# Patient Record
Sex: FEMALE | Race: BLACK OR AFRICAN AMERICAN | Employment: FULL TIME | URBAN - METROPOLITAN AREA
[De-identification: names, ages, dates, MRNs, and addresses within clinical notes are randomized per-mention and may not be internally consistent; named-entity substitution may affect disease eponyms.]

---

## 2017-01-20 ENCOUNTER — OFFICE VISIT (OUTPATIENT)
Dept: FAMILY MEDICINE CLINIC | Age: 37
End: 2017-01-20

## 2017-01-20 VITALS
BODY MASS INDEX: 32.05 KG/M2 | OXYGEN SATURATION: 98 % | SYSTOLIC BLOOD PRESSURE: 118 MMHG | WEIGHT: 216.4 LBS | HEART RATE: 81 BPM | TEMPERATURE: 97.2 F | RESPIRATION RATE: 16 BRPM | DIASTOLIC BLOOD PRESSURE: 72 MMHG | HEIGHT: 69 IN

## 2017-01-20 DIAGNOSIS — I10 ESSENTIAL HYPERTENSION: ICD-10-CM

## 2017-01-20 DIAGNOSIS — M25.561 ACUTE PAIN OF BOTH KNEES: Primary | ICD-10-CM

## 2017-01-20 DIAGNOSIS — M25.562 ACUTE PAIN OF BOTH KNEES: Primary | ICD-10-CM

## 2017-01-20 PROCEDURE — 99214 OFFICE O/P EST MOD 30 MIN: CPT | Performed by: FAMILY MEDICINE

## 2017-01-20 RX ORDER — NAPROXEN 500 MG/1
500 TABLET ORAL 2 TIMES DAILY WITH MEALS
Qty: 30 TABLET | Refills: 1 | Status: SHIPPED | OUTPATIENT
Start: 2017-01-20 | End: 2017-05-08 | Stop reason: ALTCHOICE

## 2017-01-20 RX ORDER — HYDROCHLOROTHIAZIDE 25 MG/1
25 TABLET ORAL DAILY
Qty: 30 TABLET | Refills: 1 | Status: SHIPPED | OUTPATIENT
Start: 2017-01-20 | End: 2017-04-14 | Stop reason: SDUPTHER

## 2017-01-20 ASSESSMENT — ENCOUNTER SYMPTOMS
NAUSEA: 0
ANAL BLEEDING: 0
STRIDOR: 0
COLOR CHANGE: 0
CONSTIPATION: 0
RECTAL PAIN: 0
ABDOMINAL PAIN: 0
CHOKING: 0
CHEST TIGHTNESS: 0
COUGH: 0
DIARRHEA: 0
SHORTNESS OF BREATH: 0
APNEA: 0
BLOOD IN STOOL: 0
ABDOMINAL DISTENTION: 0
WHEEZING: 0
VOMITING: 0

## 2017-01-24 ENCOUNTER — HOSPITAL ENCOUNTER (OUTPATIENT)
Dept: OTHER | Age: 37
Discharge: HOME | End: 2017-01-24
Attending: FAMILY MEDICINE | Admitting: FAMILY MEDICINE

## 2017-01-24 ENCOUNTER — TELEPHONE (OUTPATIENT)
Dept: FAMILY MEDICINE CLINIC | Age: 37
End: 2017-01-24

## 2017-01-24 DIAGNOSIS — M25.562 ACUTE PAIN OF BOTH KNEES: ICD-10-CM

## 2017-01-24 DIAGNOSIS — M25.561 ACUTE PAIN OF BOTH KNEES: ICD-10-CM

## 2017-02-22 ENCOUNTER — TELEPHONE (OUTPATIENT)
Dept: FAMILY MEDICINE CLINIC | Age: 37
End: 2017-02-22

## 2017-05-08 ENCOUNTER — OFFICE VISIT (OUTPATIENT)
Dept: FAMILY MEDICINE CLINIC | Age: 37
End: 2017-05-08

## 2017-05-08 VITALS
DIASTOLIC BLOOD PRESSURE: 76 MMHG | RESPIRATION RATE: 16 BRPM | WEIGHT: 223.6 LBS | HEART RATE: 70 BPM | HEIGHT: 69 IN | TEMPERATURE: 98.5 F | OXYGEN SATURATION: 98 % | BODY MASS INDEX: 33.12 KG/M2 | SYSTOLIC BLOOD PRESSURE: 110 MMHG

## 2017-05-08 DIAGNOSIS — M54.41 ACUTE RIGHT-SIDED LOW BACK PAIN WITH RIGHT-SIDED SCIATICA: Primary | ICD-10-CM

## 2017-05-08 PROCEDURE — 99214 OFFICE O/P EST MOD 30 MIN: CPT | Performed by: FAMILY MEDICINE

## 2017-05-08 RX ORDER — CYCLOBENZAPRINE HCL 5 MG
5 TABLET ORAL EVERY 8 HOURS PRN
Qty: 30 TABLET | Refills: 0 | Status: SHIPPED | OUTPATIENT
Start: 2017-05-08 | End: 2017-06-06 | Stop reason: ALTCHOICE

## 2017-05-08 RX ORDER — METHOCARBAMOL 750 MG/1
750 TABLET, FILM COATED ORAL 3 TIMES DAILY
COMMUNITY
End: 2017-05-08 | Stop reason: ALTCHOICE

## 2017-05-08 RX ORDER — METHYLPREDNISOLONE 4 MG/1
TABLET ORAL
Qty: 1 KIT | Refills: 0 | Status: SHIPPED | OUTPATIENT
Start: 2017-05-08 | End: 2017-06-06 | Stop reason: ALTCHOICE

## 2017-05-08 ASSESSMENT — PATIENT HEALTH QUESTIONNAIRE - PHQ9
1. LITTLE INTEREST OR PLEASURE IN DOING THINGS: 0
SUM OF ALL RESPONSES TO PHQ9 QUESTIONS 1 & 2: 0
2. FEELING DOWN, DEPRESSED OR HOPELESS: 0
SUM OF ALL RESPONSES TO PHQ QUESTIONS 1-9: 0

## 2017-05-08 ASSESSMENT — ENCOUNTER SYMPTOMS
ABDOMINAL PAIN: 0
ABDOMINAL DISTENTION: 0
STRIDOR: 0
CHOKING: 0
VOMITING: 0
COUGH: 0
RECTAL PAIN: 0
BLOOD IN STOOL: 0
DIARRHEA: 0
APNEA: 0
NAUSEA: 0
ANAL BLEEDING: 0
WHEEZING: 0
CHEST TIGHTNESS: 0
CONSTIPATION: 0
BACK PAIN: 1
SHORTNESS OF BREATH: 0

## 2017-05-23 ENCOUNTER — HOSPITAL ENCOUNTER (OUTPATIENT)
Dept: OTHER | Age: 37
Discharge: HOME | End: 2017-05-23
Attending: FAMILY MEDICINE | Admitting: FAMILY MEDICINE

## 2017-05-23 DIAGNOSIS — M54.40 LOW BACK PAIN WITH SCIATICA, SCIATICA LATERALITY UNSPECIFIED, UNSPECIFIED BACK PAIN LATERALITY, UNSPECIFIED CHRONICITY: ICD-10-CM

## 2017-05-24 ENCOUNTER — TELEPHONE (OUTPATIENT)
Dept: FAMILY MEDICINE CLINIC | Age: 37
End: 2017-05-24

## 2017-05-24 DIAGNOSIS — I10 ESSENTIAL HYPERTENSION: ICD-10-CM

## 2017-05-24 RX ORDER — HYDROCHLOROTHIAZIDE 25 MG/1
TABLET ORAL
Qty: 30 TABLET | Refills: 2 | Status: SHIPPED | OUTPATIENT
Start: 2017-05-24 | End: 2017-08-02 | Stop reason: SDUPTHER

## 2017-06-05 DIAGNOSIS — M54.41 ACUTE RIGHT-SIDED LOW BACK PAIN WITH RIGHT-SIDED SCIATICA: ICD-10-CM

## 2017-06-06 ENCOUNTER — OFFICE VISIT (OUTPATIENT)
Dept: FAMILY MEDICINE CLINIC | Age: 37
End: 2017-06-06

## 2017-06-06 ENCOUNTER — HOSPITAL ENCOUNTER (OUTPATIENT)
Dept: OTHER | Age: 37
Discharge: HOME | End: 2017-06-06
Attending: FAMILY MEDICINE | Admitting: FAMILY MEDICINE

## 2017-06-06 VITALS
WEIGHT: 220.1 LBS | SYSTOLIC BLOOD PRESSURE: 130 MMHG | DIASTOLIC BLOOD PRESSURE: 70 MMHG | HEART RATE: 86 BPM | OXYGEN SATURATION: 98 % | TEMPERATURE: 98.3 F | HEIGHT: 69 IN | BODY MASS INDEX: 32.6 KG/M2

## 2017-06-06 DIAGNOSIS — L23.89 ALLERGIC CONTACT DERMATITIS DUE TO OTHER AGENTS: Primary | ICD-10-CM

## 2017-06-06 DIAGNOSIS — M25.512 ACUTE PAIN OF LEFT SHOULDER: ICD-10-CM

## 2017-06-06 PROBLEM — L23.9 ALLERGIC CONTACT DERMATITIS: Status: ACTIVE | Noted: 2017-06-06

## 2017-06-06 PROCEDURE — 99214 OFFICE O/P EST MOD 30 MIN: CPT | Performed by: FAMILY MEDICINE

## 2017-06-06 RX ORDER — NAPROXEN 500 MG/1
500 TABLET ORAL 2 TIMES DAILY WITH MEALS
Qty: 30 TABLET | Refills: 0 | Status: SHIPPED | OUTPATIENT
Start: 2017-06-06 | End: 2017-06-06 | Stop reason: ALTCHOICE

## 2017-06-06 RX ORDER — MELOXICAM 7.5 MG/1
7.5 TABLET ORAL 2 TIMES DAILY WITH MEALS
Qty: 30 TABLET | Refills: 0 | Status: SHIPPED | OUTPATIENT
Start: 2017-06-06 | End: 2017-06-21 | Stop reason: SDUPTHER

## 2017-06-06 RX ORDER — PREDNISONE 10 MG/1
10 TABLET ORAL DAILY
Qty: 1 TABLET | Refills: 0 | Status: SHIPPED | OUTPATIENT
Start: 2017-06-06 | End: 2017-06-21 | Stop reason: ALTCHOICE

## 2017-06-06 RX ORDER — TRIAMCINOLONE ACETONIDE 0.25 MG/G
OINTMENT TOPICAL
Qty: 1 TUBE | Refills: 0 | Status: SHIPPED | OUTPATIENT
Start: 2017-06-06 | End: 2017-06-13

## 2017-06-06 RX ORDER — DIPHENHYDRAMINE HCL 25 MG
50 CAPSULE ORAL EVERY 6 HOURS PRN
Qty: 84 CAPSULE | Refills: 1 | Status: SHIPPED | OUTPATIENT
Start: 2017-06-06 | End: 2017-06-16

## 2017-06-06 ASSESSMENT — ENCOUNTER SYMPTOMS
ABDOMINAL DISTENTION: 0
COUGH: 0
APNEA: 0
DIARRHEA: 0
NAUSEA: 0
EYES NEGATIVE: 1
RECTAL PAIN: 0
ANAL BLEEDING: 0
TROUBLE SWALLOWING: 0
BLOOD IN STOOL: 0
ABDOMINAL PAIN: 0
CONSTIPATION: 0
WHEEZING: 0
VOMITING: 0
SHORTNESS OF BREATH: 0

## 2017-06-07 ENCOUNTER — HOSPITAL ENCOUNTER (OUTPATIENT)
Dept: PHYSICAL THERAPY | Age: 37
Discharge: HOME | End: 2017-06-30
Admitting: FAMILY MEDICINE

## 2017-06-14 ENCOUNTER — HOSPITAL ENCOUNTER (OUTPATIENT)
Dept: PHYSICAL THERAPY | Age: 37
Discharge: HOME | End: 2017-06-15
Admitting: FAMILY MEDICINE

## 2017-06-19 ENCOUNTER — HOSPITAL ENCOUNTER (OUTPATIENT)
Dept: PHYSICAL THERAPY | Age: 37
Discharge: HOME | End: 2017-06-20
Admitting: FAMILY MEDICINE

## 2017-06-21 ENCOUNTER — OFFICE VISIT (OUTPATIENT)
Dept: FAMILY MEDICINE CLINIC | Age: 37
End: 2017-06-21

## 2017-06-21 VITALS
DIASTOLIC BLOOD PRESSURE: 81 MMHG | BODY MASS INDEX: 32.95 KG/M2 | HEART RATE: 86 BPM | RESPIRATION RATE: 16 BRPM | HEIGHT: 69 IN | WEIGHT: 222.5 LBS | SYSTOLIC BLOOD PRESSURE: 126 MMHG

## 2017-06-21 DIAGNOSIS — I10 ESSENTIAL HYPERTENSION: ICD-10-CM

## 2017-06-21 DIAGNOSIS — L23.89 ALLERGIC CONTACT DERMATITIS DUE TO OTHER AGENTS: ICD-10-CM

## 2017-06-21 DIAGNOSIS — M25.512 ACUTE PAIN OF LEFT SHOULDER: Primary | ICD-10-CM

## 2017-06-21 DIAGNOSIS — M25.819 CYST OF JOINT OF SHOULDER: ICD-10-CM

## 2017-06-21 PROCEDURE — 99213 OFFICE O/P EST LOW 20 MIN: CPT | Performed by: FAMILY MEDICINE

## 2017-06-21 RX ORDER — MELOXICAM 7.5 MG/1
7.5 TABLET ORAL 2 TIMES DAILY WITH MEALS
Qty: 30 TABLET | Refills: 0 | Status: SHIPPED | OUTPATIENT
Start: 2017-06-21 | End: 2017-06-22 | Stop reason: ALTCHOICE

## 2017-06-21 ASSESSMENT — ENCOUNTER SYMPTOMS
ANAL BLEEDING: 0
BLOOD IN STOOL: 0
CONSTIPATION: 0
WHEEZING: 0
NAUSEA: 0
ABDOMINAL PAIN: 0
ABDOMINAL DISTENTION: 0
TROUBLE SWALLOWING: 0
DIARRHEA: 0
COUGH: 0
RECTAL PAIN: 0
VOMITING: 0
SHORTNESS OF BREATH: 0

## 2017-06-22 ENCOUNTER — TELEPHONE (OUTPATIENT)
Dept: FAMILY MEDICINE CLINIC | Age: 37
End: 2017-06-22

## 2017-06-22 DIAGNOSIS — M25.512 ACUTE PAIN OF LEFT SHOULDER: ICD-10-CM

## 2017-06-22 RX ORDER — NABUMETONE 500 MG/1
500 TABLET, FILM COATED ORAL 2 TIMES DAILY
Qty: 30 TABLET | Refills: 0 | Status: SHIPPED | OUTPATIENT
Start: 2017-06-22 | End: 2017-08-21 | Stop reason: ALTCHOICE

## 2017-06-26 ENCOUNTER — TELEPHONE (OUTPATIENT)
Dept: FAMILY MEDICINE CLINIC | Age: 37
End: 2017-06-26

## 2017-06-26 ENCOUNTER — HOSPITAL ENCOUNTER (OUTPATIENT)
Dept: PHYSICAL THERAPY | Age: 37
Discharge: HOME | End: 2017-06-27
Admitting: FAMILY MEDICINE

## 2017-06-26 DIAGNOSIS — B37.31 VAGINAL YEAST INFECTION: Primary | ICD-10-CM

## 2017-06-26 RX ORDER — FLUCONAZOLE 150 MG/1
150 TABLET ORAL ONCE
Qty: 1 TABLET | Refills: 0 | Status: SHIPPED | OUTPATIENT
Start: 2017-06-26 | End: 2017-06-26

## 2017-07-07 ENCOUNTER — HOSPITAL ENCOUNTER (OUTPATIENT)
Dept: MRI IMAGING | Age: 37
Discharge: HOME | End: 2017-07-07
Attending: FAMILY MEDICINE | Admitting: FAMILY MEDICINE

## 2017-07-07 DIAGNOSIS — M25.819 CYST OF JOINT OF SHOULDER: ICD-10-CM

## 2017-07-07 DIAGNOSIS — M25.512 ACUTE PAIN OF LEFT SHOULDER: ICD-10-CM

## 2017-07-08 DIAGNOSIS — R93.6 ABNORMAL MRI, SHOULDER: Primary | ICD-10-CM

## 2017-07-08 DIAGNOSIS — M25.819 CYST OF JOINT OF SHOULDER: ICD-10-CM

## 2017-07-08 DIAGNOSIS — M25.512 ACUTE PAIN OF LEFT SHOULDER: ICD-10-CM

## 2017-07-10 ENCOUNTER — TELEPHONE (OUTPATIENT)
Dept: FAMILY MEDICINE CLINIC | Age: 37
End: 2017-07-10

## 2017-07-25 ENCOUNTER — TELEPHONE (OUTPATIENT)
Dept: FAMILY MEDICINE CLINIC | Age: 37
End: 2017-07-25

## 2017-07-29 ENCOUNTER — OFFICE VISIT (OUTPATIENT)
Dept: FAMILY MEDICINE CLINIC | Age: 37
End: 2017-07-29

## 2017-07-29 VITALS
TEMPERATURE: 98.7 F | HEIGHT: 69 IN | OXYGEN SATURATION: 99 % | HEART RATE: 66 BPM | RESPIRATION RATE: 16 BRPM | SYSTOLIC BLOOD PRESSURE: 120 MMHG | DIASTOLIC BLOOD PRESSURE: 86 MMHG | WEIGHT: 221 LBS | BODY MASS INDEX: 32.73 KG/M2

## 2017-07-29 DIAGNOSIS — L73.9 FOLLICULITIS OF PERINEUM: Primary | ICD-10-CM

## 2017-07-29 PROCEDURE — 99213 OFFICE O/P EST LOW 20 MIN: CPT | Performed by: INTERNAL MEDICINE

## 2017-08-04 ENCOUNTER — HOSPITAL ENCOUNTER (OUTPATIENT)
Dept: PHYSICAL THERAPY | Age: 37
Discharge: HOME | End: 2017-08-31
Attending: ORTHOPAEDIC SURGERY | Admitting: ORTHOPAEDIC SURGERY

## 2017-08-09 ENCOUNTER — TELEPHONE (OUTPATIENT)
Dept: FAMILY MEDICINE CLINIC | Age: 37
End: 2017-08-09

## 2017-08-21 ENCOUNTER — OFFICE VISIT (OUTPATIENT)
Dept: FAMILY MEDICINE CLINIC | Age: 37
End: 2017-08-21

## 2017-08-21 VITALS
OXYGEN SATURATION: 99 % | SYSTOLIC BLOOD PRESSURE: 114 MMHG | RESPIRATION RATE: 16 BRPM | HEART RATE: 81 BPM | WEIGHT: 222.4 LBS | HEIGHT: 69 IN | TEMPERATURE: 98.7 F | BODY MASS INDEX: 32.94 KG/M2 | DIASTOLIC BLOOD PRESSURE: 80 MMHG

## 2017-08-21 DIAGNOSIS — M54.42 ACUTE BILATERAL LOW BACK PAIN WITH BILATERAL SCIATICA: Primary | ICD-10-CM

## 2017-08-21 DIAGNOSIS — Z13.0 SCREENING FOR DEFICIENCY ANEMIA: ICD-10-CM

## 2017-08-21 DIAGNOSIS — Z13.29 SCREENING FOR THYROID DISORDER: ICD-10-CM

## 2017-08-21 DIAGNOSIS — M54.41 ACUTE BILATERAL LOW BACK PAIN WITH BILATERAL SCIATICA: Primary | ICD-10-CM

## 2017-08-21 DIAGNOSIS — Z13.1 SCREENING FOR DIABETES MELLITUS: ICD-10-CM

## 2017-08-21 PROCEDURE — 99214 OFFICE O/P EST MOD 30 MIN: CPT | Performed by: FAMILY MEDICINE

## 2017-08-21 RX ORDER — CYCLOBENZAPRINE HCL 5 MG
5 TABLET ORAL 3 TIMES DAILY PRN
Qty: 30 TABLET | Refills: 1 | Status: SHIPPED | OUTPATIENT
Start: 2017-08-21 | End: 2018-03-13 | Stop reason: CLARIF

## 2017-08-21 RX ORDER — NAPROXEN 500 MG/1
500 TABLET ORAL 2 TIMES DAILY WITH MEALS
Qty: 30 TABLET | Refills: 1 | Status: SHIPPED | OUTPATIENT
Start: 2017-08-21 | End: 2017-09-29 | Stop reason: SDUPTHER

## 2017-08-21 RX ORDER — METHYLPREDNISOLONE 4 MG/1
TABLET ORAL
Qty: 1 KIT | Refills: 0 | Status: SHIPPED | OUTPATIENT
Start: 2017-08-21 | End: 2018-03-13 | Stop reason: CLARIF

## 2017-08-21 ASSESSMENT — ENCOUNTER SYMPTOMS
SHORTNESS OF BREATH: 0
VOMITING: 0
ANAL BLEEDING: 0
BACK PAIN: 1
STRIDOR: 0
DIARRHEA: 0
RECTAL PAIN: 0
ABDOMINAL PAIN: 0
APNEA: 0
WHEEZING: 0
COUGH: 0
NAUSEA: 0
CHOKING: 0
CHEST TIGHTNESS: 0
CONSTIPATION: 0
ABDOMINAL DISTENTION: 0
BLOOD IN STOOL: 0

## 2017-08-25 DIAGNOSIS — I10 ESSENTIAL HYPERTENSION: ICD-10-CM

## 2017-08-25 LAB
ANION GAP SERPL CALCULATED.3IONS-SCNC: 14 MMOL/L (ref 3–16)
BUN BLDV-MCNC: 18 MG/DL (ref 7–20)
CALCIUM SERPL-MCNC: 9.1 MG/DL (ref 8.3–10.6)
CHLORIDE BLD-SCNC: 102 MMOL/L (ref 99–110)
CO2: 27 MMOL/L (ref 21–32)
CREAT SERPL-MCNC: 0.7 MG/DL (ref 0.6–1.1)
GFR AFRICAN AMERICAN: >60
GFR NON-AFRICAN AMERICAN: >60
GLUCOSE BLD-MCNC: 88 MG/DL (ref 70–99)
HCT VFR BLD CALC: 35.5 % (ref 36–48)
HEMOGLOBIN: 11.6 G/DL (ref 12–16)
MCH RBC QN AUTO: 26.7 PG (ref 26–34)
MCHC RBC AUTO-ENTMCNC: 32.7 G/DL (ref 31–36)
MCV RBC AUTO: 81.8 FL (ref 80–100)
PDW BLD-RTO: 14.9 % (ref 12.4–15.4)
PLATELET # BLD: ABNORMAL K/UL (ref 135–450)
PLATELET SLIDE REVIEW: ADEQUATE
PMV BLD AUTO: ABNORMAL FL (ref 5–10.5)
POTASSIUM SERPL-SCNC: 3.6 MMOL/L (ref 3.5–5.1)
RBC # BLD: 4.34 M/UL (ref 4–5.2)
SODIUM BLD-SCNC: 143 MMOL/L (ref 136–145)
TSH SERPL DL<=0.05 MIU/L-ACNC: 0.62 UIU/ML (ref 0.27–4.2)
WBC # BLD: 7.4 K/UL (ref 4–11)

## 2017-08-26 LAB
ESTIMATED AVERAGE GLUCOSE: 119.8 MG/DL
HBA1C MFR BLD: 5.8 %

## 2017-09-06 ENCOUNTER — OFFICE VISIT (OUTPATIENT)
Dept: FAMILY MEDICINE CLINIC | Age: 37
End: 2017-09-06

## 2017-09-06 VITALS
BODY MASS INDEX: 32.8 KG/M2 | HEIGHT: 70 IN | RESPIRATION RATE: 16 BRPM | SYSTOLIC BLOOD PRESSURE: 112 MMHG | HEART RATE: 85 BPM | DIASTOLIC BLOOD PRESSURE: 78 MMHG | TEMPERATURE: 98.6 F | OXYGEN SATURATION: 98 % | WEIGHT: 229.1 LBS

## 2017-09-06 DIAGNOSIS — D64.9 LOW HEMOGLOBIN: ICD-10-CM

## 2017-09-06 DIAGNOSIS — E66.09 NON MORBID OBESITY DUE TO EXCESS CALORIES: ICD-10-CM

## 2017-09-06 DIAGNOSIS — Z13.220 LIPID SCREENING: ICD-10-CM

## 2017-09-06 DIAGNOSIS — R79.89 ABNORMAL CBC: ICD-10-CM

## 2017-09-06 DIAGNOSIS — Z00.00 ROUTINE GENERAL MEDICAL EXAMINATION AT A HEALTH CARE FACILITY: Primary | ICD-10-CM

## 2017-09-06 DIAGNOSIS — I10 ESSENTIAL HYPERTENSION: ICD-10-CM

## 2017-09-06 PROCEDURE — 99395 PREV VISIT EST AGE 18-39: CPT | Performed by: FAMILY MEDICINE

## 2017-09-06 ASSESSMENT — ENCOUNTER SYMPTOMS
WHEEZING: 0
NAUSEA: 0
DIARRHEA: 0
ABDOMINAL PAIN: 0
FACIAL SWELLING: 0
SHORTNESS OF BREATH: 0
VOMITING: 0
EYE PAIN: 0
TROUBLE SWALLOWING: 0
EYE ITCHING: 0
APNEA: 0
EYE DISCHARGE: 0
EYE REDNESS: 0
CONSTIPATION: 0
SORE THROAT: 0
PHOTOPHOBIA: 0
STRIDOR: 0
RHINORRHEA: 0
ANAL BLEEDING: 0
CHEST TIGHTNESS: 0
BLOOD IN STOOL: 0
VOICE CHANGE: 0
CHOKING: 0
COLOR CHANGE: 0
ABDOMINAL DISTENTION: 0
RECTAL PAIN: 0
BACK PAIN: 0
COUGH: 0
SINUS PRESSURE: 0

## 2017-09-15 DIAGNOSIS — Z13.220 LIPID SCREENING: ICD-10-CM

## 2017-09-15 DIAGNOSIS — D64.9 LOW HEMOGLOBIN: ICD-10-CM

## 2017-09-15 DIAGNOSIS — R79.89 ABNORMAL CBC: ICD-10-CM

## 2017-09-15 DIAGNOSIS — Z00.00 ROUTINE GENERAL MEDICAL EXAMINATION AT A HEALTH CARE FACILITY: ICD-10-CM

## 2017-09-15 LAB
A/G RATIO: 1.6 (ref 1.1–2.2)
ALBUMIN SERPL-MCNC: 4.4 G/DL (ref 3.4–5)
ALP BLD-CCNC: 66 U/L (ref 40–129)
ALT SERPL-CCNC: 11 U/L (ref 10–40)
ANION GAP SERPL CALCULATED.3IONS-SCNC: 17 MMOL/L (ref 3–16)
AST SERPL-CCNC: 15 U/L (ref 15–37)
BASOPHILS ABSOLUTE: 0.1 K/UL (ref 0–0.2)
BASOPHILS RELATIVE PERCENT: 0.6 %
BILIRUB SERPL-MCNC: 0.3 MG/DL (ref 0–1)
BLOOD SMEAR REVIEW: NORMAL
BUN BLDV-MCNC: 13 MG/DL (ref 7–20)
CALCIUM SERPL-MCNC: 9.7 MG/DL (ref 8.3–10.6)
CHLORIDE BLD-SCNC: 99 MMOL/L (ref 99–110)
CHOLESTEROL, TOTAL: 128 MG/DL (ref 0–199)
CO2: 25 MMOL/L (ref 21–32)
CREAT SERPL-MCNC: 0.7 MG/DL (ref 0.6–1.1)
EOSINOPHILS ABSOLUTE: 0.1 K/UL (ref 0–0.6)
EOSINOPHILS RELATIVE PERCENT: 1.7 %
FERRITIN: 33.7 NG/ML (ref 15–150)
GFR AFRICAN AMERICAN: >60
GFR NON-AFRICAN AMERICAN: >60
GLOBULIN: 2.7 G/DL
GLUCOSE BLD-MCNC: 82 MG/DL (ref 70–99)
HCT VFR BLD CALC: 35.8 % (ref 36–48)
HDLC SERPL-MCNC: 66 MG/DL (ref 40–60)
HEMATOLOGY PATH CONSULT: YES
HEMOGLOBIN: 11.9 G/DL (ref 12–16)
IRON SATURATION: 13 % (ref 15–50)
IRON: 52 UG/DL (ref 37–145)
LDL CHOLESTEROL CALCULATED: 49 MG/DL
LYMPHOCYTES ABSOLUTE: 3.1 K/UL (ref 1–5.1)
LYMPHOCYTES RELATIVE PERCENT: 37.2 %
MCH RBC QN AUTO: 26.7 PG (ref 26–34)
MCHC RBC AUTO-ENTMCNC: 33.2 G/DL (ref 31–36)
MCV RBC AUTO: 80.4 FL (ref 80–100)
MONOCYTES ABSOLUTE: 0.6 K/UL (ref 0–1.3)
MONOCYTES RELATIVE PERCENT: 6.8 %
NEUTROPHILS ABSOLUTE: 4.4 K/UL (ref 1.7–7.7)
NEUTROPHILS RELATIVE PERCENT: 53.7 %
PDW BLD-RTO: 14.5 % (ref 12.4–15.4)
PLATELET # BLD: ABNORMAL K/UL (ref 135–450)
PLATELET SLIDE REVIEW: ADEQUATE
PMV BLD AUTO: ABNORMAL FL (ref 5–10.5)
POTASSIUM SERPL-SCNC: 3.9 MMOL/L (ref 3.5–5.1)
RBC # BLD: 4.45 M/UL (ref 4–5.2)
SODIUM BLD-SCNC: 141 MMOL/L (ref 136–145)
TOTAL IRON BINDING CAPACITY: 386 UG/DL (ref 260–445)
TOTAL PROTEIN: 7.1 G/DL (ref 6.4–8.2)
TRIGL SERPL-MCNC: 66 MG/DL (ref 0–150)
VLDLC SERPL CALC-MCNC: 13 MG/DL
WBC # BLD: 8.3 K/UL (ref 4–11)

## 2017-09-17 DIAGNOSIS — R79.89 ABNORMAL CBC: Primary | ICD-10-CM

## 2017-09-18 ENCOUNTER — TELEPHONE (OUTPATIENT)
Dept: FAMILY MEDICINE CLINIC | Age: 37
End: 2017-09-18

## 2017-09-18 LAB — HEMATOLOGY PATH CONSULT: NORMAL

## 2017-09-19 ENCOUNTER — TELEPHONE (OUTPATIENT)
Dept: FAMILY MEDICINE CLINIC | Age: 37
End: 2017-09-19

## 2017-09-19 DIAGNOSIS — M54.5 LOW BACK PAIN, UNSPECIFIED BACK PAIN LATERALITY, UNSPECIFIED CHRONICITY, WITH SCIATICA PRESENCE UNSPECIFIED: Primary | ICD-10-CM

## 2017-09-19 DIAGNOSIS — M25.559 ARTHRALGIA OF HIP, UNSPECIFIED LATERALITY: ICD-10-CM

## 2017-09-29 ENCOUNTER — TELEPHONE (OUTPATIENT)
Dept: FAMILY MEDICINE CLINIC | Age: 37
End: 2017-09-29

## 2017-09-29 ENCOUNTER — HOSPITAL ENCOUNTER (OUTPATIENT)
Dept: OTHER | Age: 37
Discharge: HOME | End: 2017-09-29
Attending: FAMILY MEDICINE | Admitting: FAMILY MEDICINE

## 2017-09-29 DIAGNOSIS — M54.42 ACUTE BILATERAL LOW BACK PAIN WITH BILATERAL SCIATICA: ICD-10-CM

## 2017-09-29 DIAGNOSIS — M54.41 ACUTE BILATERAL LOW BACK PAIN WITH BILATERAL SCIATICA: ICD-10-CM

## 2017-09-29 DIAGNOSIS — I10 ESSENTIAL HYPERTENSION: ICD-10-CM

## 2017-09-29 DIAGNOSIS — R79.89 ABNORMAL CBC: ICD-10-CM

## 2017-09-29 LAB
HCT VFR BLD CALC: 35.4 % (ref 36–48)
HEMOGLOBIN: 11.6 G/DL (ref 12–16)
MCH RBC QN AUTO: 26.5 PG (ref 26–34)
MCHC RBC AUTO-ENTMCNC: 32.9 G/DL (ref 31–36)
MCV RBC AUTO: 80.6 FL (ref 80–100)
PDW BLD-RTO: 14.1 % (ref 12.4–15.4)
PLATELET # BLD: 147 K/UL (ref 135–450)
PMV BLD AUTO: 10.8 FL (ref 5–10.5)
RBC # BLD: 4.4 M/UL (ref 4–5.2)
WBC # BLD: 6.6 K/UL (ref 4–11)

## 2017-09-29 RX ORDER — HYDROCHLOROTHIAZIDE 25 MG/1
TABLET ORAL
Qty: 30 TABLET | Refills: 1 | Status: SHIPPED | OUTPATIENT
Start: 2017-09-29 | End: 2017-11-10 | Stop reason: SDUPTHER

## 2017-09-29 RX ORDER — NAPROXEN 500 MG/1
500 TABLET ORAL 2 TIMES DAILY WITH MEALS
Qty: 30 TABLET | Refills: 1 | Status: SHIPPED | OUTPATIENT
Start: 2017-09-29 | End: 2017-11-10 | Stop reason: SDUPTHER

## 2017-09-29 NOTE — TELEPHONE ENCOUNTER
Patient is calling requesting a refill of   hydrochlorothiazide (HYDRODIURIL) 25 MG tablet  naproxen (NAPROSYN) 500 MG tablet  Please advise  Shawn Ly [phone] (home) [phone] (work)

## 2017-11-10 ENCOUNTER — TELEPHONE (OUTPATIENT)
Dept: FAMILY MEDICINE CLINIC | Age: 37
End: 2017-11-10

## 2017-11-10 DIAGNOSIS — I10 ESSENTIAL HYPERTENSION: ICD-10-CM

## 2017-11-10 DIAGNOSIS — M54.41 ACUTE BILATERAL LOW BACK PAIN WITH BILATERAL SCIATICA: ICD-10-CM

## 2017-11-10 DIAGNOSIS — M54.42 ACUTE BILATERAL LOW BACK PAIN WITH BILATERAL SCIATICA: ICD-10-CM

## 2017-11-10 RX ORDER — NAPROXEN 500 MG/1
500 TABLET ORAL 2 TIMES DAILY WITH MEALS
Qty: 30 TABLET | Refills: 1 | Status: SHIPPED | OUTPATIENT
Start: 2017-11-10 | End: 2018-03-13 | Stop reason: CLARIF

## 2017-11-10 RX ORDER — HYDROCHLOROTHIAZIDE 25 MG/1
TABLET ORAL
Qty: 30 TABLET | Refills: 1 | Status: SHIPPED | OUTPATIENT
Start: 2017-11-10 | End: 2018-01-22 | Stop reason: ALTCHOICE

## 2017-12-18 ENCOUNTER — TELEPHONE (OUTPATIENT)
Dept: FAMILY MEDICINE CLINIC | Age: 37
End: 2017-12-18

## 2018-01-22 ENCOUNTER — OFFICE VISIT (OUTPATIENT)
Dept: FAMILY MEDICINE CLINIC | Age: 38
End: 2018-01-22

## 2018-01-22 VITALS
HEART RATE: 83 BPM | BODY MASS INDEX: 32.77 KG/M2 | OXYGEN SATURATION: 98 % | SYSTOLIC BLOOD PRESSURE: 150 MMHG | HEIGHT: 70 IN | RESPIRATION RATE: 16 BRPM | DIASTOLIC BLOOD PRESSURE: 105 MMHG | WEIGHT: 228.9 LBS | TEMPERATURE: 99 F

## 2018-01-22 DIAGNOSIS — R42 DIZZINESS: Primary | ICD-10-CM

## 2018-01-22 DIAGNOSIS — I10 ESSENTIAL HYPERTENSION: ICD-10-CM

## 2018-01-22 PROCEDURE — 99214 OFFICE O/P EST MOD 30 MIN: CPT | Performed by: FAMILY MEDICINE

## 2018-01-22 RX ORDER — LISINOPRIL 5 MG/1
5 TABLET ORAL DAILY
Qty: 30 TABLET | Refills: 0 | Status: SHIPPED | OUTPATIENT
Start: 2018-01-22 | End: 2018-01-30 | Stop reason: ALTCHOICE

## 2018-01-22 RX ORDER — HYDROCHLOROTHIAZIDE 25 MG/1
TABLET ORAL
Qty: 30 TABLET | Refills: 1 | Status: CANCELLED | OUTPATIENT
Start: 2018-01-22

## 2018-01-22 RX ORDER — MECLIZINE HCL 12.5 MG/1
12.5 TABLET ORAL 3 TIMES DAILY PRN
Qty: 30 TABLET | Refills: 1 | Status: SHIPPED | OUTPATIENT
Start: 2018-01-22 | End: 2018-03-13 | Stop reason: CLARIF

## 2018-01-22 ASSESSMENT — ENCOUNTER SYMPTOMS
BLOOD IN STOOL: 0
EYE DISCHARGE: 0
COLOR CHANGE: 0
ABDOMINAL PAIN: 0
APNEA: 0
VOICE CHANGE: 0
WHEEZING: 0
SHORTNESS OF BREATH: 0
SINUS PRESSURE: 0
CHOKING: 0
TROUBLE SWALLOWING: 0
ABDOMINAL DISTENTION: 0
NAUSEA: 0
CHEST TIGHTNESS: 0
DIARRHEA: 0
ANAL BLEEDING: 0
VOMITING: 0
RECTAL PAIN: 0
SORE THROAT: 0
SINUS PAIN: 0
EYE ITCHING: 0
CONSTIPATION: 0
PHOTOPHOBIA: 0
EYE REDNESS: 0
RHINORRHEA: 0
STRIDOR: 0
FACIAL SWELLING: 0
COUGH: 0
EYE PAIN: 0

## 2018-01-22 NOTE — PATIENT INSTRUCTIONS
Patient Education        High Blood Pressure: Care Instructions  Your Care Instructions    If your blood pressure is usually above 140/90, you have high blood pressure, or hypertension. That means the top number is 140 or higher or the bottom number is 90 or higher, or both. Despite what a lot of people think, high blood pressure usually doesn't cause headaches or make you feel dizzy or lightheaded. It usually has no symptoms. But it does increase your risk for heart attack, stroke, and kidney or eye damage. The higher your blood pressure, the more your risk increases. Your doctor will give you a goal for your blood pressure. Your goal will be based on your health and your age. An example of a goal is to keep your blood pressure below 140/90. Lifestyle changes, such as eating healthy and being active, are always important to help lower blood pressure. You might also take medicine to reach your blood pressure goal.  Follow-up care is a key part of your treatment and safety. Be sure to make and go to all appointments, and call your doctor if you are having problems. It's also a good idea to know your test results and keep a list of the medicines you take. How can you care for yourself at home? Medical treatment  · If you stop taking your medicine, your blood pressure will go back up. You may take one or more types of medicine to lower your blood pressure. Be safe with medicines. Take your medicine exactly as prescribed. Call your doctor if you think you are having a problem with your medicine. · Talk to your doctor before you start taking aspirin every day. Aspirin can help certain people lower their risk of a heart attack or stroke. But taking aspirin isn't right for everyone, because it can cause serious bleeding. · See your doctor regularly. You may need to see the doctor more often at first or until your blood pressure comes down.   · If you are taking blood pressure medicine, talk to your doctor before arms.  ¨ Lightheadedness or sudden weakness. ¨ A fast or irregular heartbeat. ? · You have symptoms of a stroke. These may include:  ¨ Sudden numbness, tingling, weakness, or loss of movement in your face, arm, or leg, especially on only one side of your body. ¨ Sudden vision changes. ¨ Sudden trouble speaking. ¨ Sudden confusion or trouble understanding simple statements. ¨ Sudden problems with walking or balance. ¨ A sudden, severe headache that is different from past headaches. ? · You have severe back or belly pain. ?Do not wait until your blood pressure comes down on its own. Get help right away. ?Call your doctor now or seek immediate care if:  ? · Your blood pressure is much higher than normal (such as 180/110 or higher), but you don't have symptoms. ? · You think high blood pressure is causing symptoms, such as:  ¨ Severe headache. ¨ Blurry vision. ? Watch closely for changes in your health, and be sure to contact your doctor if:  ? · Your blood pressure measures 140/90 or higher at least 2 times. That means the top number is 140 or higher or the bottom number is 90 or higher, or both. ? · You think you may be having side effects from your blood pressure medicine. ? · Your blood pressure is usually normal, but it goes above normal at least 2 times. Where can you learn more? Go to [email or web address] and sign in to your Benhauer account. Enter L510 in the ISIS box to learn more about \"High Blood Pressure: Care Instructions. \"     If you do not have an account, please click on the \"Sign Up Now\" link. Current as of: Christy 10, 2017  Content Version: 11.5  © 7580-1258 [employer]. Care instructions adapted under license by [address]  If you have questions about a medical condition or this instruction, always ask your healthcare professional. Kiran Gomez any warranty or liability for your use of this information. Patient Education        Dizziness: Care Instructions  Your Care Instructions  Dizziness is the feeling of unsteadiness or fuzziness in your head. It is different than having vertigo, which is a feeling that the room is spinning or that you are moving or falling. It is also different from lightheadedness, which is the feeling that you are about to faint. It can be hard to know what causes dizziness. Some people feel dizzy when they have migraine headaches. Sometimes bouts of flu can make you feel dizzy. Some medical conditions, such as heart problems or high blood pressure, can make you feel dizzy. Many medicines can cause dizziness, including medicines for high blood pressure, pain, or anxiety. If a medicine causes your symptoms, your doctor may recommend that you stop or change the medicine. If it is a problem with your heart, you may need medicine to help your heart work better. If there is no clear reason for your symptoms, your doctor may suggest watching and waiting for a while to see if the dizziness goes away on its own. Follow-up care is a key part of your treatment and safety. Be sure to make and go to all appointments, and call your doctor if you are having problems. It's also a good idea to know your test results and keep a list of the medicines you take. How can you care for yourself at home? · If your doctor recommends or prescribes medicine, take it exactly as directed. Call your doctor if you think you are having a problem with your medicine. · Do not drive while you feel dizzy. · Try to prevent falls. Steps you can take include:  ¨ Using nonskid mats, adding grab bars near the tub, and using night-lights. ¨ Clearing your home so that walkways are free of anything you might trip on. ¨ Letting family and friends know that you have been feeling dizzy. This will help them know how to help you. When should you call for help?   Call 911 anytime you think you may need emergency

## 2018-01-22 NOTE — LETTER
[address] [address]  [address] [facility]  Phone: [phone]  Fax: [phone]    Madeline Abernathy MD        January 22, 2018     Patient: Steve Barrera   YOB: 1980   Date of Visit: 1/22/2018       To Whom it May Concern:    Steve Barrera was seen in my clinic on 1/22/2018. Please excuse her from work/school for 2-3 days. If you have any questions or concerns, please don't hesitate to call.     Sincerely,         Madeline Abernathy MD

## 2018-01-22 NOTE — PROGRESS NOTES
Subjective:      Patient ID: Gabo Cerrato is a 44 y.o. female. HPI  Presenting w/new complaint of mild to moderate intermittent dizziness x 3days. Started 3days ago. Last episode today. Lasts few minutes. Feels like she is spinning around the room. No prior similar sx in the past.   Associated w/nothing else. Worsened(aggravated) by sitting up or walking:bp elevation:see note below. Improves by sitting still. No sx during driving. Denies ear pain/tinnitus/ear discharge-fullness/vison disturbances/HA/neck pain-stiffness/rash/fever/chills/cp/sob/presycope/syncope/ASA use/fatigue/weakness. HTN:was taking bp med w/o side effects. Med stopped by another physician(rheumatologist:[facility]) approx 3weeks ago. Bp readings by work nurse 160-190s/100  Adds salt to food at the table:No.  Denies cp/sob/pnd/ankle edema. Allergies   Allergen Reactions    Latex      purities    Amoxicillin     Vicodin [Hydrocodone-Acetaminophen] Itching and Nausea Only    Flagyl [Metronidazole] Rash       Current Outpatient Prescriptions on File Prior to Visit   Medication Sig Dispense Refill    hydrochlorothiazide (HYDRODIURIL) 25 MG tablet TAKE 1 TABLET BY MOUTH DAILY 30 tablet 1    naproxen (NAPROSYN) 500 MG tablet Take 1 tablet by mouth 2 times daily (with meals) 30 tablet 1    methylPREDNISolone (MEDROL, OR[place],) 4 MG tablet Take by mouth as directed. 1 kit 0    cyclobenzaprine (FLEXERIL) 5 MG tablet Take 1 tablet by mouth 3 times daily as needed for Muscle spasms May cause drowsiness:avoid driving/alcohol. 30 tablet 1    hydrochlorothiazide (HYDRODIURIL) 25 MG tablet TAKE 1 TABLET BY MOUTH DAILY 30 tablet 1    clotrimazole-betamethasone (LOTRISONE) 1-0.05 % cream        No current facility-administered medications on file prior to visit.         Past Medical History:   Diagnosis Date    Anemia     Back pain     Cervical cancer screening 2014;2015    Nml per pt':under care of Dr. Wilian Aguero

## 2018-01-23 ENCOUNTER — TELEPHONE (OUTPATIENT)
Dept: FAMILY MEDICINE CLINIC | Age: 38
End: 2018-01-23

## 2018-01-23 ENCOUNTER — HOSPITAL ENCOUNTER (OUTPATIENT)
Dept: OTHER | Age: 38
Discharge: HOME | End: 2018-01-23
Attending: FAMILY MEDICINE | Admitting: FAMILY MEDICINE

## 2018-01-23 DIAGNOSIS — I10 ESSENTIAL HYPERTENSION: ICD-10-CM

## 2018-01-23 DIAGNOSIS — R42 DIZZINESS: ICD-10-CM

## 2018-01-23 LAB
ANION GAP SERPL CALCULATED.3IONS-SCNC: 11 MMOL/L (ref 3–16)
BUN BLDV-MCNC: 9 MG/DL (ref 7–20)
CALCIUM SERPL-MCNC: 9.3 MG/DL (ref 8.3–10.6)
CHLORIDE BLD-SCNC: 104 MMOL/L (ref 99–110)
CO2: 27 MMOL/L (ref 21–32)
CREAT SERPL-MCNC: 0.8 MG/DL (ref 0.6–1.1)
EKG ATRIAL RATE: 63 BPM
EKG DIAGNOSIS: NORMAL
EKG P AXIS: 58 DEGREES
EKG P-R INTERVAL: 170 MS
EKG Q-T INTERVAL: 424 MS
EKG QRS DURATION: 86 MS
EKG QTC CALCULATION (BAZETT): 433 MS
EKG R AXIS: 67 DEGREES
EKG T AXIS: 62 DEGREES
EKG VENTRICULAR RATE: 63 BPM
GFR AFRICAN AMERICAN: >60
GFR NON-AFRICAN AMERICAN: >60
GLUCOSE BLD-MCNC: 64 MG/DL (ref 70–99)
HCT VFR BLD CALC: 38 % (ref 36–48)
HEMOGLOBIN: 12.2 G/DL (ref 12–16)
MCH RBC QN AUTO: 25.9 PG (ref 26–34)
MCHC RBC AUTO-ENTMCNC: 32.2 G/DL (ref 31–36)
MCV RBC AUTO: 80.4 FL (ref 80–100)
PDW BLD-RTO: 15.1 % (ref 12.4–15.4)
PLATELET # BLD: 221 K/UL (ref 135–450)
PMV BLD AUTO: 10.5 FL (ref 5–10.5)
POTASSIUM SERPL-SCNC: 3.7 MMOL/L (ref 3.5–5.1)
RBC # BLD: 4.73 M/UL (ref 4–5.2)
SODIUM BLD-SCNC: 142 MMOL/L (ref 136–145)
WBC # BLD: 6.9 K/UL (ref 4–11)

## 2018-01-23 PROCEDURE — 93010 ELECTROCARDIOGRAM REPORT: CPT | Performed by: INTERNAL MEDICINE

## 2018-01-23 NOTE — TELEPHONE ENCOUNTER
[phone]    Darin Jordan    Want to know if any test results are back from today. Had labs and EKG done at [facility].     PT last seen yesterday

## 2018-01-30 ENCOUNTER — OFFICE VISIT (OUTPATIENT)
Dept: FAMILY MEDICINE CLINIC | Age: 38
End: 2018-01-30

## 2018-01-30 VITALS
TEMPERATURE: 98 F | DIASTOLIC BLOOD PRESSURE: 87 MMHG | WEIGHT: 232.3 LBS | OXYGEN SATURATION: 99 % | HEART RATE: 81 BPM | SYSTOLIC BLOOD PRESSURE: 128 MMHG | HEIGHT: 70 IN | BODY MASS INDEX: 33.26 KG/M2 | RESPIRATION RATE: 16 BRPM

## 2018-01-30 DIAGNOSIS — I10 ESSENTIAL HYPERTENSION: Primary | ICD-10-CM

## 2018-01-30 DIAGNOSIS — R94.31 ABNORMAL EKG: ICD-10-CM

## 2018-01-30 DIAGNOSIS — R07.89 CHEST PRESSURE: ICD-10-CM

## 2018-01-30 DIAGNOSIS — E66.9 NON MORBID OBESITY: ICD-10-CM

## 2018-01-30 DIAGNOSIS — R42 DIZZINESS: ICD-10-CM

## 2018-01-30 PROCEDURE — 99213 OFFICE O/P EST LOW 20 MIN: CPT | Performed by: FAMILY MEDICINE

## 2018-01-30 RX ORDER — AMLODIPINE BESYLATE 2.5 MG/1
2.5 TABLET ORAL DAILY
Qty: 30 TABLET | Refills: 0 | Status: SHIPPED | OUTPATIENT
Start: 2018-01-30 | End: 2018-02-01 | Stop reason: SINTOL

## 2018-01-30 ASSESSMENT — ENCOUNTER SYMPTOMS
ANAL BLEEDING: 0
CHOKING: 0
COUGH: 0
APNEA: 0
RECTAL PAIN: 0
BLOOD IN STOOL: 0
CONSTIPATION: 0
WHEEZING: 0
STRIDOR: 0
ABDOMINAL PAIN: 0
CHEST TIGHTNESS: 0
VOMITING: 0
NAUSEA: 0
SHORTNESS OF BREATH: 0
ABDOMINAL DISTENTION: 0

## 2018-01-30 NOTE — PROGRESS NOTES
Latest Ref Range: 5.0 - 10.5 fL 10.5   RDW Latest Ref Range: 12.4 - 15.4 % 15.1   Platelet Count Latest Ref Range: 135 - 450 K/uL 221     F/u to:mild to moderate intermittent dizziness >2weeks:taking meclizine w/o side effects. Is improving but not resolved   Associated w/nothing else. .  Worsened(aggravated) by sitting up or walking:bp elevation:see note below. Improves by sitting still. Denies ear pain/tinnitus/ear discharge-fullness/vison disturbances/HA/neck pain-stiffness/rash/fever/chills/cp/sob/presycope/syncope/ASA use/fatigue/weakness. HTN:taking lisinopril w/side effects of diarrhea. Bp readings by work nurse 150-160/100  Adds salt to food at the table:Never does. Associated with one episode of chest pressure(central) approx 3days ago. EKG:see above. Denies current cp/sob/pnd/ankle edema. Allergies   Allergen Reactions    Latex      purities    Amoxicillin     Hydrochlorothiazide Other (See Comments)     Stopped by rheumatologist    Vicodin [Hydrocodone-Acetaminophen] Itching and Nausea Only    Flagyl [Metronidazole] Rash       Current Outpatient Prescriptions on File Prior to Visit   Medication Sig Dispense Refill    methylPREDNISolone (MEDROL, [place],) 4 MG tablet Take by mouth as directed. 1 kit 0    lisinopril (PRINIVIL;ZESTRIL) 5 MG tablet Take 1 tablet by mouth daily For blood pressure. 30 tablet 0    meclizine (ANTIVERT) 12.5 MG tablet Take 1 tablet by mouth 3 times daily as needed for Dizziness 30 tablet 1    naproxen (NAPROSYN) 500 MG tablet Take 1 tablet by mouth 2 times daily (with meals) 30 tablet 1    cyclobenzaprine (FLEXERIL) 5 MG tablet Take 1 tablet by mouth 3 times daily as needed for Muscle spasms May cause drowsiness:avoid driving/alcohol. 30 tablet 1    clotrimazole-betamethasone (LOTRISONE) 1-0.05 % cream        No current facility-administered medications on file prior to visit.         Past Medical History:   Diagnosis Date    Anemia     Back pain     Cervical cancer screening 2014;2015    Nml per pt':under care of Dr. Donavan Rae hypertension 11/2/2015    Gestational hypertension     Midline low back pain 4/6/2016    Non morbid obesity 11/16/2015           Social History   Substance Use Topics    Smoking status: Never Smoker    Smokeless tobacco: Never Used    Alcohol use 0.6 oz/week     1 Glasses of wine per week      Comment: occasional           Review of Systems   Constitutional: Negative for activity change, appetite change, chills, diaphoresis, fatigue, fever and unexpected weight change. Eyes: Negative for visual disturbance. Respiratory: Negative for apnea, cough, choking, chest tightness, shortness of breath, wheezing and stridor. Cardiovascular: Negative for chest pain, palpitations and leg swelling. Gastrointestinal: Negative for abdominal distention, abdominal pain, anal bleeding, blood in stool, constipation, nausea, rectal pain and vomiting. Endocrine: Negative. Skin: Negative for pallor, rash and wound. Neurological: Positive for dizziness. Negative for tremors, seizures, syncope, facial asymmetry, speech difficulty, weakness, light-headedness, numbness and headaches. Hematological: Negative for adenopathy. Does not bruise/bleed easily. Objective:   Physical Exam   Constitutional: She is oriented to person, place, and time. Vital signs are normal. She appears well-developed and well-nourished. She is cooperative. She does not have a sickly appearance. No distress. HENT:   Nose: Nose normal.   Mouth/Throat: Uvula is midline, oropharynx is clear and moist and mucous membranes are normal.   Hearing intact to nml conversation   Eyes: Conjunctivae, EOM and lids are normal. Pupils are equal, round, and reactive to light. Neck: Trachea normal and normal range of motion. Neck supple. No JVD present. Carotid bruit is not present.    Cardiovascular: Normal rate, regular rhythm, normal heart sounds, intact distal pulses and normal pulses. Exam reveals no gallop. No murmur heard. No leg-ankle edema. Nml orthostats. Pulmonary/Chest: Effort normal and breath sounds normal.   CTAB,good AE bilaterally   Abdominal: Soft. Normal appearance and bowel sounds are normal. She exhibits no distension and no mass. There is no hepatomegaly. There is no tenderness. Lymphadenopathy:     She has no cervical adenopathy. Neurological: She is alert and oriented to person, place, and time. She has normal reflexes. Skin: Skin is warm, dry and intact. No rash noted. She is not diaphoretic. No cyanosis. No pallor. Good skin turgor. Capillary refill=2-3 secs. Assessment:       1. Essential hypertension  Diastolic not at goal.  Start alternative med to lisinopril due to side effects. Dizziness is improving & may be bp related. Due to recent chest pressure & risk factors will eval with stress echo. Possible med side effects reviewed. Pt' wishes to proceed w/med. Diarrhea:will see if change in bp med improves this. amLODIPine (NORVASC) 2.5 MG tablet    ECHO Stress Test will schedule at [address] this week. 2. Chest pressure  See note#1. ECHO Stress Test   3. Non morbid obesity  Diet regime reviewed. 4. Abnormal EKG  See note#1. ECHO Stress Test   5. Dizziness  See note#1. ECHO Stress Test             Plan:        Call or return to clinic prn if these symptoms worsen or fail to improve as anticipated. Pt' left office in good condition. Advised to go to local ER or call 911 for any worrisome signs/sx including but not limited to worsening of current complaint or development of cp/sob/neuro deficits.

## 2018-01-30 NOTE — PATIENT INSTRUCTIONS
you take decongestants or anti-inflammatory medicine, such as ibuprofen. Some of these medicines can raise blood pressure. · Learn how to check your blood pressure at home. Lifestyle changes  · Stay at a healthy weight. This is especially important if you put on weight around the waist. Losing even 10 pounds can help you lower your blood pressure. · If your doctor recommends it, get more exercise. Walking is a good choice. Bit by bit, increase the amount you walk every day. Try for at least 30 minutes on most days of the week. You also may want to swim, bike, or do other activities. · Avoid or limit alcohol. Talk to your doctor about whether you can drink any alcohol. · Try to limit how much sodium you eat to less than 2,300 milligrams (mg) a day. Your doctor may ask you to try to eat less than 1,500 mg a day. · Eat plenty of fruits (such as bananas and oranges), vegetables, legumes, whole grains, and low-fat dairy products. · Lower the amount of saturated fat in your diet. Saturated fat is found in animal products such as milk, cheese, and meat. Limiting these foods may help you lose weight and also lower your risk for heart disease. · Do not smoke. Smoking increases your risk for heart attack and stroke. If you need help quitting, talk to your doctor about stop-smoking programs and medicines. These can increase your chances of quitting for good. When should you call for help? Call 911 anytime you think you may need emergency care. This may mean having symptoms that suggest that your blood pressure is causing a serious heart or blood vessel problem. Your blood pressure may be over 180/110. ? For example, call 911 if:  ? · You have symptoms of a heart attack. These may include:  ¨ Chest pain or pressure, or a strange feeling in the chest.  ¨ Sweating. ¨ Shortness of breath. ¨ Nausea or vomiting.   ¨ Pain, pressure, or a strange feeling in the back, neck, jaw, or upper belly or in one or both shoulders or arms.  ¨ Lightheadedness or sudden weakness. ¨ A fast or irregular heartbeat. ? · You have symptoms of a stroke. These may include:  ¨ Sudden numbness, tingling, weakness, or loss of movement in your face, arm, or leg, especially on only one side of your body. ¨ Sudden vision changes. ¨ Sudden trouble speaking. ¨ Sudden confusion or trouble understanding simple statements. ¨ Sudden problems with walking or balance. ¨ A sudden, severe headache that is different from past headaches. ? · You have severe back or belly pain. ?Do not wait until your blood pressure comes down on its own. Get help right away. ?Call your doctor now or seek immediate care if:  ? · Your blood pressure is much higher than normal (such as 180/110 or higher), but you don't have symptoms. ? · You think high blood pressure is causing symptoms, such as:  ¨ Severe headache. ¨ Blurry vision. ? Watch closely for changes in your health, and be sure to contact your doctor if:  ? · Your blood pressure measures 140/90 or higher at least 2 times. That means the top number is 140 or higher or the bottom number is 90 or higher, or both. ? · You think you may be having side effects from your blood pressure medicine. ? · Your blood pressure is usually normal, but it goes above normal at least 2 times. Where can you learn more? Go to [email or web address] and sign in to your Poptip account. Enter Z611 in the View the Space box to learn more about \"High Blood Pressure: Care Instructions. \"     If you do not have an account, please click on the \"Sign Up Now\" link. Current as of: Christy 10, 2017  Content Version: 11.5  © 1017-9692 [employer], [employer]. Care instructions adapted under license by [facility] ([facility]).  If you have questions about a medical condition or this instruction, always ask your healthcare professional. Kiran Gomez any warranty or liability for your use of this information.

## 2018-02-01 ENCOUNTER — TELEPHONE (OUTPATIENT)
Dept: FAMILY MEDICINE CLINIC | Age: 38
End: 2018-02-01

## 2018-02-01 ENCOUNTER — HOSPITAL ENCOUNTER (OUTPATIENT)
Dept: NON INVASIVE DIAGNOSTICS | Age: 38
Discharge: HOME | End: 2018-02-01
Attending: FAMILY MEDICINE | Admitting: FAMILY MEDICINE

## 2018-02-01 DIAGNOSIS — I10 ESSENTIAL HYPERTENSION: Primary | ICD-10-CM

## 2018-02-01 DIAGNOSIS — I10 ESSENTIAL (PRIMARY) HYPERTENSION: ICD-10-CM

## 2018-02-01 RX ORDER — ATENOLOL 25 MG/1
25 TABLET ORAL DAILY
Qty: 30 TABLET | Refills: 0 | Status: SHIPPED | OUTPATIENT
Start: 2018-02-01 | End: 2018-03-13 | Stop reason: CLARIF

## 2018-02-01 NOTE — TELEPHONE ENCOUNTER
Notify pt':  HCTZ was stopped by her rheumatologist due to side effect concerns. Ok to stop norvasc & start new bp med:atenolol 25mg 1 tab daily. F/u next week for med check. If her current sx do not improve after stopping norvasc then sooner appt advised.

## 2018-03-06 ENCOUNTER — TELEPHONE (OUTPATIENT)
Dept: FAMILY MEDICINE CLINIC | Age: 38
End: 2018-03-06

## 2018-03-06 NOTE — TELEPHONE ENCOUNTER
Pt would like to speak to Maylin, said that she has been trying the blood pressure pill for the last month and it is making her feel bad.      · Headache   · Nauseated  · Tired    Please advise  Calixto Ram [phone]

## 2018-03-13 ENCOUNTER — OFFICE VISIT (OUTPATIENT)
Dept: FAMILY MEDICINE CLINIC | Age: 38
End: 2018-03-13

## 2018-03-13 VITALS
BODY MASS INDEX: 31.95 KG/M2 | RESPIRATION RATE: 16 BRPM | WEIGHT: 223.2 LBS | HEIGHT: 70 IN | SYSTOLIC BLOOD PRESSURE: 121 MMHG | HEART RATE: 83 BPM | DIASTOLIC BLOOD PRESSURE: 82 MMHG

## 2018-03-13 DIAGNOSIS — R42 DIZZINESS: ICD-10-CM

## 2018-03-13 DIAGNOSIS — I10 ESSENTIAL HYPERTENSION: Primary | ICD-10-CM

## 2018-03-13 DIAGNOSIS — E66.9 NON MORBID OBESITY: ICD-10-CM

## 2018-03-13 DIAGNOSIS — R07.89 CHEST PRESSURE: ICD-10-CM

## 2018-03-13 PROCEDURE — 99213 OFFICE O/P EST LOW 20 MIN: CPT | Performed by: FAMILY MEDICINE

## 2018-03-13 RX ORDER — HYDROCHLOROTHIAZIDE 25 MG/1
25 TABLET ORAL DAILY
COMMUNITY
End: 2018-10-15 | Stop reason: CLARIF

## 2018-03-13 ASSESSMENT — ENCOUNTER SYMPTOMS
SHORTNESS OF BREATH: 0
STRIDOR: 0
CONSTIPATION: 0
BLOOD IN STOOL: 0
WHEEZING: 0
CHEST TIGHTNESS: 0
APNEA: 0
NAUSEA: 0
ABDOMINAL PAIN: 0
CHOKING: 0
ABDOMINAL DISTENTION: 0
COUGH: 0
ANAL BLEEDING: 0
VOMITING: 0

## 2018-03-13 NOTE — PROGRESS NOTES
Subjective:      Patient ID: Seven Encarnacion is a 44 y.o. female. Hypertension   Pertinent negatives include no chest pain, headaches, palpitations or shortness of breath. Details     Reading Physician Reading Date Result Priority   Erasmo Armas MD 2/1/2018    Narrative     Stress Echocardiography Report     Demographics      Patient Name      ERNESTINA LLANES      Date of Study     02/01/2018          Gender              Female      Patient Number    [id number]          Date of Birth       1980      Visit Number      [id number]         Age                 44 year(s)      Accession Number  [id number]           Room Number               Corporate ID      [id number]            Sonographer         Ankit Hammond,                                                             RVADELAIDE, CS      Ordering          David Chris       Interpreting        [facility]   Physician         Merline Kitchen, MD           Physician           April Peace MD      The procedure was explained in detail to the patient. Risks,   complications and alternative treatments were reviewed. Written consent   was obtained.     Procedure    Type of Study      Stress procedure:ECHOCARDIOGRAM STRESS TEST.     Procedure Date  Date: 02/01/2018 Start: 03:15 PM    Study Location: [address] Echo Lab  Technical Quality: Adequate visualization    Indications:Abnormal ECG. Additional Indications:chest pressure; dizziness. Patient Status: Routine    Height: 70 inches Weight: 232 pounds BSA: 2.22 m2 BMI: 33.29 kg/m2     Conclusions      Summary   Baseline resting echocardiogram shows normal global LV systolic function   with an ejection fraction of 55% and uniform myocardial segmental wall   motion.  Following stress there was uniform augmentation of all myocardial   segments with appropriate hyperdynamic LV systolic response to stress with   an ejection fraction of

## 2018-03-27 ENCOUNTER — OFFICE VISIT (OUTPATIENT)
Dept: FAMILY MEDICINE CLINIC | Age: 38
End: 2018-03-27

## 2018-03-27 VITALS
OXYGEN SATURATION: 99 % | SYSTOLIC BLOOD PRESSURE: 122 MMHG | HEIGHT: 70 IN | RESPIRATION RATE: 16 BRPM | BODY MASS INDEX: 31.35 KG/M2 | WEIGHT: 219 LBS | HEART RATE: 82 BPM | DIASTOLIC BLOOD PRESSURE: 83 MMHG | TEMPERATURE: 98.5 F

## 2018-03-27 DIAGNOSIS — N30.00 ACUTE CYSTITIS WITHOUT HEMATURIA: ICD-10-CM

## 2018-03-27 DIAGNOSIS — I10 ESSENTIAL HYPERTENSION: ICD-10-CM

## 2018-03-27 DIAGNOSIS — R35.0 URINATION FREQUENCY: Primary | ICD-10-CM

## 2018-03-27 LAB
BACTERIA: ABNORMAL /HPF
BILIRUBIN URINE: NEGATIVE
BILIRUBIN, POC: NORMAL
BLOOD URINE, POC: NORMAL
BLOOD, URINE: NEGATIVE
CLARITY, POC: NORMAL
CLARITY: ABNORMAL
COLOR, POC: YELLOW
COLOR: YELLOW
EPITHELIAL CELLS, UA: 14 /HPF (ref 0–5)
GLUCOSE URINE, POC: NORMAL
GLUCOSE URINE: NEGATIVE MG/DL
KETONES, POC: NORMAL
KETONES, URINE: ABNORMAL MG/DL
LEUKOCYTE EST, POC: NORMAL
LEUKOCYTE ESTERASE, URINE: ABNORMAL
MICROSCOPIC EXAMINATION: YES
NITRITE, POC: NORMAL
NITRITE, URINE: NEGATIVE
PH UA: 6
PH, POC: 6
PROTEIN UA: 30 MG/DL
PROTEIN, POC: NORMAL
RBC UA: ABNORMAL /HPF (ref 0–2)
SPECIFIC GRAVITY UA: 1.03
SPECIFIC GRAVITY, POC: 1.02
URINE TYPE: ABNORMAL
UROBILINOGEN, POC: NORMAL
UROBILINOGEN, URINE: 0.2 E.U./DL
WBC UA: 81 /HPF (ref 0–5)

## 2018-03-27 PROCEDURE — 1036F TOBACCO NON-USER: CPT | Performed by: FAMILY MEDICINE

## 2018-03-27 PROCEDURE — G8417 CALC BMI ABV UP PARAM F/U: HCPCS | Performed by: FAMILY MEDICINE

## 2018-03-27 PROCEDURE — G8427 DOCREV CUR MEDS BY ELIG CLIN: HCPCS | Performed by: FAMILY MEDICINE

## 2018-03-27 PROCEDURE — 99214 OFFICE O/P EST MOD 30 MIN: CPT | Performed by: FAMILY MEDICINE

## 2018-03-27 PROCEDURE — 81002 URINALYSIS NONAUTO W/O SCOPE: CPT | Performed by: FAMILY MEDICINE

## 2018-03-27 PROCEDURE — G8484 FLU IMMUNIZE NO ADMIN: HCPCS | Performed by: FAMILY MEDICINE

## 2018-03-27 RX ORDER — CIPROFLOXACIN 250 MG/1
250 TABLET, FILM COATED ORAL 2 TIMES DAILY
Qty: 10 TABLET | Refills: 0 | Status: SHIPPED | OUTPATIENT
Start: 2018-03-27 | End: 2018-04-01

## 2018-03-27 ASSESSMENT — ENCOUNTER SYMPTOMS
ABDOMINAL PAIN: 0
CONSTIPATION: 0
ABDOMINAL DISTENTION: 0
COUGH: 0
DIARRHEA: 0
VOMITING: 0
RECTAL PAIN: 0
WHEEZING: 0
CHEST TIGHTNESS: 0
APNEA: 0
BLOOD IN STOOL: 0
SHORTNESS OF BREATH: 0
STRIDOR: 0
NAUSEA: 0
CHOKING: 0
ANAL BLEEDING: 0

## 2018-03-27 NOTE — LETTER
[address] [address]  [address] [facility]  Phone: [phone]  Fax: [phone]    Tobias Montero MD        March 27, 2018     Patient: Darin Jordan   YOB: 1980   Date of Visit: 3/27/2018       To Whom it May Concern:    Darin Jordan was seen in my clinic on 3/27/2018. If you have any questions or concerns, please don't hesitate to call.     Sincerely,         Tobias Montero MD

## 2018-03-29 LAB — URINE CULTURE, ROUTINE: NORMAL

## 2018-04-10 ENCOUNTER — TELEPHONE (OUTPATIENT)
Dept: FAMILY MEDICINE CLINIC | Age: 38
End: 2018-04-10

## 2018-04-10 DIAGNOSIS — R35.0 URINARY FREQUENCY: Primary | ICD-10-CM

## 2018-06-08 ENCOUNTER — TELEPHONE (OUTPATIENT)
Dept: FAMILY MEDICINE CLINIC | Age: 38
End: 2018-06-08

## 2018-10-10 ENCOUNTER — TELEPHONE (OUTPATIENT)
Dept: FAMILY MEDICINE CLINIC | Age: 38
End: 2018-10-10

## 2018-10-10 DIAGNOSIS — I10 ESSENTIAL HYPERTENSION: ICD-10-CM

## 2018-10-10 RX ORDER — HYDROCHLOROTHIAZIDE 25 MG/1
TABLET ORAL
Qty: 30 TABLET | Refills: 1 | Status: CANCELLED | OUTPATIENT
Start: 2018-10-10

## 2018-10-10 RX ORDER — HYDROCHLOROTHIAZIDE 25 MG/1
TABLET ORAL
Qty: 30 TABLET | Refills: 1 | Status: SHIPPED | OUTPATIENT
Start: 2018-10-10 | End: 2018-12-18 | Stop reason: SDUPTHER

## 2018-10-10 NOTE — TELEPHONE ENCOUNTER
Patient is calling requesting a refill of hydrochlorothiazide (HYDRODIURIL) 25 MG tablet  Please advise  Jan Cottrell [phone] (home) [phone] (work)

## 2018-10-15 ENCOUNTER — OFFICE VISIT (OUTPATIENT)
Dept: FAMILY MEDICINE CLINIC | Age: 38
End: 2018-10-15
Payer: COMMERCIAL

## 2018-10-15 VITALS
DIASTOLIC BLOOD PRESSURE: 74 MMHG | HEIGHT: 70 IN | BODY MASS INDEX: 33.37 KG/M2 | SYSTOLIC BLOOD PRESSURE: 112 MMHG | HEART RATE: 82 BPM | RESPIRATION RATE: 16 BRPM | TEMPERATURE: 98.3 F | WEIGHT: 233.1 LBS | OXYGEN SATURATION: 98 %

## 2018-10-15 DIAGNOSIS — I10 ESSENTIAL HYPERTENSION: ICD-10-CM

## 2018-10-15 DIAGNOSIS — Z13.0 SCREENING FOR DEFICIENCY ANEMIA: ICD-10-CM

## 2018-10-15 DIAGNOSIS — M54.42 ACUTE BILATERAL LOW BACK PAIN WITH BILATERAL SCIATICA: Primary | ICD-10-CM

## 2018-10-15 DIAGNOSIS — R42 DIZZINESS: ICD-10-CM

## 2018-10-15 DIAGNOSIS — H81.13 BENIGN PAROXYSMAL POSITIONAL VERTIGO DUE TO BILATERAL VESTIBULAR DISORDER: ICD-10-CM

## 2018-10-15 DIAGNOSIS — M54.41 ACUTE BILATERAL LOW BACK PAIN WITH BILATERAL SCIATICA: Primary | ICD-10-CM

## 2018-10-15 DIAGNOSIS — Z80.0 FAMILY HISTORY OF COLON CANCER: ICD-10-CM

## 2018-10-15 PROCEDURE — 99214 OFFICE O/P EST MOD 30 MIN: CPT | Performed by: FAMILY MEDICINE

## 2018-10-15 RX ORDER — NAPROXEN 500 MG/1
500 TABLET ORAL 2 TIMES DAILY WITH MEALS
Qty: 20 TABLET | Refills: 0 | Status: SHIPPED | OUTPATIENT
Start: 2018-10-15 | End: 2018-12-18 | Stop reason: SDUPTHER

## 2018-10-15 RX ORDER — METHYLPREDNISOLONE 4 MG/1
TABLET ORAL
Qty: 1 KIT | Refills: 0 | Status: SHIPPED | OUTPATIENT
Start: 2018-10-15 | End: 2018-12-18 | Stop reason: CLARIF

## 2018-10-15 RX ORDER — MECLIZINE HCL 12.5 MG/1
12.5 TABLET ORAL 3 TIMES DAILY PRN
Qty: 90 TABLET | Refills: 0 | Status: SHIPPED | OUTPATIENT
Start: 2018-10-15 | End: 2018-12-18 | Stop reason: CLARIF

## 2018-10-15 RX ORDER — NAPROXEN 500 MG/1
500 TABLET ORAL 2 TIMES DAILY WITH MEALS
Qty: 30 TABLET | Refills: 0 | Status: CANCELLED | OUTPATIENT
Start: 2018-10-15

## 2018-10-15 ASSESSMENT — ENCOUNTER SYMPTOMS
RECTAL PAIN: 0
COUGH: 0
CHOKING: 0
DIARRHEA: 0
CHEST TIGHTNESS: 0
NAUSEA: 0
ANAL BLEEDING: 0
SHORTNESS OF BREATH: 0
WHEEZING: 0
APNEA: 0
ABDOMINAL PAIN: 0
BLOOD IN STOOL: 0
STRIDOR: 0
ABDOMINAL DISTENTION: 0
VOMITING: 0
CONSTIPATION: 0

## 2018-10-15 ASSESSMENT — PATIENT HEALTH QUESTIONNAIRE - PHQ9
1. LITTLE INTEREST OR PLEASURE IN DOING THINGS: 0
SUM OF ALL RESPONSES TO PHQ QUESTIONS 1-9: 0
SUM OF ALL RESPONSES TO PHQ9 QUESTIONS 1 & 2: 0
2. FEELING DOWN, DEPRESSED OR HOPELESS: 0
SUM OF ALL RESPONSES TO PHQ QUESTIONS 1-9: 0

## 2018-10-15 NOTE — PATIENT INSTRUCTIONS
[email or web address] and sign in to your Bowntyt account. Enter V175 in the Zoyihire box to learn more about \"Back Stretches: Exercises. \"     If you do not have an account, please click on the \"Sign Up Now\" link. Current as of: November 29, 2017  Content Version: 11.7  © 0683-4951 "[employer]. Care instructions adapted under license by [employer] ([facility]). If you have questions about a medical condition or this instruction, always ask your healthcare professional. [address] any warranty or liability for your use of this information. Patient Education            Current as of: November 29, 2017  Content Version: 11.7  © 5493-4343 "[employer]. Care instructions adapted under license by [employer] ([facility]). If you have questions about a medical condition or this instruction, always ask your healthcare professional. [address] any warranty or liability for your use of this information.

## 2018-10-26 DIAGNOSIS — R42 DIZZINESS: ICD-10-CM

## 2018-10-27 LAB
A/G RATIO: 1.7 (ref 1.1–2.2)
ALBUMIN SERPL-MCNC: 4.5 G/DL (ref 3.4–5)
ALP BLD-CCNC: 72 U/L (ref 40–129)
ALT SERPL-CCNC: 13 U/L (ref 10–40)
ANION GAP SERPL CALCULATED.3IONS-SCNC: 14 MMOL/L (ref 3–16)
AST SERPL-CCNC: 17 U/L (ref 15–37)
BILIRUB SERPL-MCNC: <0.2 MG/DL (ref 0–1)
BUN BLDV-MCNC: 13 MG/DL (ref 7–20)
CALCIUM SERPL-MCNC: 9.6 MG/DL (ref 8.3–10.6)
CHLORIDE BLD-SCNC: 102 MMOL/L (ref 99–110)
CO2: 27 MMOL/L (ref 21–32)
CREAT SERPL-MCNC: 0.7 MG/DL (ref 0.6–1.1)
GFR AFRICAN AMERICAN: >60
GFR NON-AFRICAN AMERICAN: >60
GLOBULIN: 2.7 G/DL
GLUCOSE BLD-MCNC: 75 MG/DL (ref 70–99)
HCT VFR BLD CALC: 36.2 % (ref 36–48)
HEMOGLOBIN: 11.8 G/DL (ref 12–16)
MCH RBC QN AUTO: 26 PG (ref 26–34)
MCHC RBC AUTO-ENTMCNC: 32.7 G/DL (ref 31–36)
MCV RBC AUTO: 79.7 FL (ref 80–100)
PDW BLD-RTO: 15.1 % (ref 12.4–15.4)
PLATELET # BLD: 168 K/UL (ref 135–450)
PLATELET SLIDE REVIEW: ABNORMAL
PMV BLD AUTO: 10.3 FL (ref 5–10.5)
POTASSIUM SERPL-SCNC: 4 MMOL/L (ref 3.5–5.1)
RBC # BLD: 4.54 M/UL (ref 4–5.2)
SLIDE REVIEW: ABNORMAL
SODIUM BLD-SCNC: 143 MMOL/L (ref 136–145)
TOTAL PROTEIN: 7.2 G/DL (ref 6.4–8.2)
WBC # BLD: 7.1 K/UL (ref 4–11)

## 2018-11-09 ENCOUNTER — HOSPITAL ENCOUNTER (OUTPATIENT)
Age: 38
Discharge: HOME | End: 2018-11-09
Payer: COMMERCIAL

## 2018-11-09 ENCOUNTER — HOSPITAL ENCOUNTER (OUTPATIENT)
Dept: GENERAL RADIOLOGY | Age: 38
Discharge: HOME | End: 2018-11-09
Payer: COMMERCIAL

## 2018-11-09 DIAGNOSIS — M54.42 ACUTE BILATERAL LOW BACK PAIN WITH BILATERAL SCIATICA: ICD-10-CM

## 2018-11-09 DIAGNOSIS — M54.41 ACUTE BILATERAL LOW BACK PAIN WITH BILATERAL SCIATICA: ICD-10-CM

## 2018-11-09 PROCEDURE — 72100 X-RAY EXAM L-S SPINE 2/3 VWS: CPT

## 2018-11-12 ENCOUNTER — HOSPITAL ENCOUNTER (OUTPATIENT)
Age: 38
Setting detail: OUTPATIENT SURGERY
Discharge: HOME | End: 2018-11-12
Attending: INTERNAL MEDICINE | Admitting: INTERNAL MEDICINE
Payer: COMMERCIAL

## 2018-11-12 VITALS
OXYGEN SATURATION: 98 % | DIASTOLIC BLOOD PRESSURE: 80 MMHG | RESPIRATION RATE: 18 BRPM | HEART RATE: 88 BPM | TEMPERATURE: 98.4 F | HEIGHT: 69 IN | BODY MASS INDEX: 33.62 KG/M2 | WEIGHT: 227 LBS | SYSTOLIC BLOOD PRESSURE: 126 MMHG

## 2018-11-12 PROCEDURE — 7100000011 HC PHASE II RECOVERY - ADDTL 15 MIN: Performed by: INTERNAL MEDICINE

## 2018-11-12 PROCEDURE — 99152 MOD SED SAME PHYS/QHP 5/>YRS: CPT | Performed by: INTERNAL MEDICINE

## 2018-11-12 PROCEDURE — 6360000002 HC RX W HCPCS: Performed by: INTERNAL MEDICINE

## 2018-11-12 PROCEDURE — 7100000010 HC PHASE II RECOVERY - FIRST 15 MIN: Performed by: INTERNAL MEDICINE

## 2018-11-12 PROCEDURE — 3609009500 HC COLONOSCOPY DIAGNOSTIC OR SCREENING: Performed by: INTERNAL MEDICINE

## 2018-11-12 RX ORDER — MIDAZOLAM HYDROCHLORIDE 1 MG/ML
INJECTION INTRAMUSCULAR; INTRAVENOUS PRN
Status: DISCONTINUED | OUTPATIENT
Start: 2018-11-12 | End: 2018-11-12 | Stop reason: HOSPADM

## 2018-11-12 RX ORDER — MEPERIDINE HYDROCHLORIDE 50 MG/ML
INJECTION INTRAMUSCULAR; INTRAVENOUS; SUBCUTANEOUS PRN
Status: DISCONTINUED | OUTPATIENT
Start: 2018-11-12 | End: 2018-11-12 | Stop reason: HOSPADM

## 2018-11-12 ASSESSMENT — PAIN - FUNCTIONAL ASSESSMENT
PAIN_FUNCTIONAL_ASSESSMENT: 0-10
PAIN_FUNCTIONAL_ASSESSMENT: 0-10

## 2018-11-13 NOTE — PROCEDURES
[address], [address]                                 PROCEDURE NOTE    PATIENT NAME: Gricel Chatman                     :        1980  MED REC NO:   [id number]                          ROOM:  ACCOUNT NO:   [de-identified]                           ADMIT DATE: 2018  PROVIDER:     Maddie Ferrera MD    DATE OF PROCEDURE:  2018    PREOPERATIVE DIAGNOSIS:  Family history of colon cancer. POSTOPERATIVE DIAGNOSIS:  Normal examination. PROCEDURE:  Colonoscopy. SURGEON:  Maddie Ferrera MD    ANESTHESIA:  Demerol 50 mg and Versed 5 mg. COMPLICATIONS:  None. DESCRIPTION OF PROCEDURE:  Informed consent was obtained after  explaining the risks of bleeding, infection, allergy, perforation,  medical and surgical management as well as non-detection of any colon  neoplasia. Colonoscope through the anus was advanced to the cecum, was  normal.  Ascending, transverse, descending, and sigmoid colon including  rectum forward and retroflexed views normal.  Recovery room in stable  condition. IMPRESSION AND PLAN:  Dr. Edilia Broussard will have the patient return for  surveillance colonoscopy in five years.         Cortney Munoz MD    D: 2018 12:21:50       T: 2018 15:48:42     [email or web address]  Job#: [id number]     Doc#: [id number]    CC:  MD Anastasia Nicole MD

## 2018-12-01 ENCOUNTER — HOSPITAL ENCOUNTER (OUTPATIENT)
Dept: PHYSICAL THERAPY | Age: 38
Setting detail: THERAPIES SERIES
Discharge: HOME | End: 2018-12-01
Payer: COMMERCIAL

## 2018-12-01 PROCEDURE — G8978 MOBILITY CURRENT STATUS: HCPCS

## 2018-12-01 PROCEDURE — G0283 ELEC STIM OTHER THAN WOUND: HCPCS

## 2018-12-01 PROCEDURE — 97162 PT EVAL MOD COMPLEX 30 MIN: CPT

## 2018-12-01 PROCEDURE — 97530 THERAPEUTIC ACTIVITIES: CPT

## 2018-12-01 PROCEDURE — G8979 MOBILITY GOAL STATUS: HCPCS

## 2018-12-01 ASSESSMENT — PAIN SCALES - QUEBEC BACK PAIN DISABILITY SCALE
PULL OR PUSH HEAVY DOORS: 1
BEND OVER TO CLEAN THE BATHTUB: 3
QUEBEC CMS MODIFIER: CK
MOVE A CHAIR: 2
WALK A FEW BLOCKS OR 300 TO 400M: 2
THROW A BALL: 1
TURN OVER IN BED: 3
TOTAL SCORE: 45
RIDE IN A CAR: 3
CLIMB ONE FLIGHT OF STAIRS: 2
TAKE FOOD OUT OF THE REFRIGERATOR: 0
SLEEP THROUGH THE NIGHT: 3
STAND UP FOR 20 TO 30 MINUTES: 3
WALK SEVERAL KILOMETERS  OR MILES: 4
GET OUT OF BED: 3
REACH UP TO HIGH SHELVES: 1
LIFT AND CARRY A HEAVY SUITCASE: 2
PUT ON SOCKS OR PANYHOSE: 1
RUN ONE BLOCK OR 100M: 5
QUEBEC DISABILITY INDEX: 40-59%
MAKE YOUR BED: 1
SIT IN A CHAIR FOR SEVERAL HOURS: 4
CARRY TWO BAGS OF GROCERIES: 1

## 2018-12-01 ASSESSMENT — PAIN DESCRIPTION - ONSET: ONSET: AWAKENED FROM SLEEP

## 2018-12-01 ASSESSMENT — PAIN DESCRIPTION - ORIENTATION: ORIENTATION: RIGHT;LEFT

## 2018-12-01 ASSESSMENT — PAIN DESCRIPTION - FREQUENCY: FREQUENCY: CONTINUOUS

## 2018-12-01 ASSESSMENT — PAIN DESCRIPTION - PAIN TYPE: TYPE: CHRONIC PAIN

## 2018-12-01 ASSESSMENT — PAIN SCALES - GENERAL: PAINLEVEL_OUTOF10: 3

## 2018-12-01 ASSESSMENT — PAIN DESCRIPTION - LOCATION: LOCATION: BACK

## 2018-12-01 ASSESSMENT — PAIN DESCRIPTION - DESCRIPTORS: DESCRIPTORS: ACHING;SHARP;RADIATING

## 2018-12-01 ASSESSMENT — PAIN DESCRIPTION - PROGRESSION: CLINICAL_PROGRESSION: GRADUALLY WORSENING

## 2018-12-02 ASSESSMENT — PAIN DESCRIPTION - PROGRESSION: CLINICAL_PROGRESSION: GRADUALLY WORSENING

## 2018-12-03 NOTE — PROGRESS NOTES
50%  Special Tests: slump test (+); repeated movements demonstrates increased radicular symptoms with standing flexion, centralization with standing extension, prone    Strength RLE  Strength RLE: WFL  Comment: 5/5 throughout  Strength LLE  Strength LLE: WFL  Comment: 5/5 throughout  Motor Control  Gross Motor?: WNL  Additional Measures  Flexibility: tight hamstrings B  Sensation  Overall Sensation Status: WNL        Ambulation  Ambulation?: Yes  Ambulation 1  Surface: level tile  Device: No Device  Quality of Gait: No significant gait deviations noted  Distance: community distances  Balance  Posture: Poor  Sitting - Static: Good  Sitting - Dynamic: Good  Standing - Static: Good  Standing - Dynamic: Good                         Assessment   Conditions Requiring Skilled Therapeutic Intervention  Body structures, Functions, Activity limitations: Decreased functional mobility ; Decreased ADL status; Decreased ROM; Decreased strength;Decreased endurance;Decreased high-level IADLs  Assessment: Pt presents with low back pain, radicular in nature that has been intermittent for several years.   Will benefit from skilled PT for improved postural awareness, core strengthening, improved endurance and decreaseed pain  Treatment Diagnosis: limited lumbar ROM and decreased core strength, decreased postural awareness, pain with ADL's  Prognosis: Excellent  Decision Making: Medium Complexity  History: Pt with long standing history of intermittent low back pain and radicular symptoms  Exam: Limited lumbar ROM, decreased core strength, pt responding well with centralization of symptoms when in prone or with standing extension  Clinical Presentation: Pt with limited endurance and functional mobility secondary to pain  Patient Education: Educated on spinal anatomy and mechanics using model, educated on centralization principle and posture correction  REQUIRES PT FOLLOW UP: Yes  Activity Tolerance  Activity Tolerance: Patient Tolerated treatment well  Activity Tolerance: Decreased pain after session         Plan   Plan  Times per week: 2-3  Plan weeks: 4-6  Specific instructions for Next Treatment: alternate aquatic visits with land visits; core strengthening, postural education   Current Treatment Recommendations: Strengthening, ROM, Functional Mobility Training, Endurance Training, Gait Training, Manual Therapy - Soft Tissue Mobilization, Modalities, Aquatics    G-Code       OutComes Score  [place] Total Score: 45                                        Goals  Short term goals  Time Frame for Short term goals: 3 weeks  Short term goal 1: Pt will be independent with HEP in order to decrease back and radicular pain for improved function  Long term goals  Time Frame for Long term goals : 6 weeks  Long term goal 1: Pt will demonstrate AROM lumbar spine to at least 75% for all directions in order to decrease pain   Long term goal 2: Pt will report no radicular symptoms in order to improve ability to perform work duties  Long term goal 3: Pt will report an overall decrease in pain of at least 50% in order to perform all work duties without difficulty  Long term goal 4: Pt will report the ability to sleep through the night without waking because of pain  Patient Goals   Patient goals :  To improve postural awareness and decrease pain so she can perform all work duties without difficulty       Therapy Time   Individual Concurrent Group Co-treatment   Time In 1300         Time Out 1400         Minutes 60         Timed Code Treatment Minutes: [place], [address] [facility], [address]

## 2018-12-03 NOTE — PROGRESS NOTES
Physical Therapy      Outpatient Physical Therapy     Phone: [phone] Fax: [phone]     To: Referring Practitioner: María Marquez MD      Patient: Frankie Dumont   : 1980   MRN: [id number]  Evaluation Date: 2018      Diagnosis Information:  · Diagnosis: Acute B low back pain with sciatica   · Treatment Diagnosis: limited lumbar ROM and decreased core strength, decreased postural awareness, pain with ADL's     Physical Therapy Certification/Re-Certification Form  Dear Dr. Laureen Sullivan  The following patient has been evaluated for physical therapy services. Please review the attached evaluation and/or summary of the patient's plan of care, and verify that you agree therapy should continue by signing the attached document and sending it back to our office. Plan of Care/Treatment to date:  [x] Therapeutic Exercise      [x] Modalities:  [x] Therapeutic Activity        [] Ultrasound    [] Gait Training        [] Cervical Traction   [] Neuromuscular Re-education      [x] Cold/hotpack    [x] Instruction in HEP        [x] Lumbar Traction  [x] Manual Therapy        [x] Electrical Stimulation            [x] Aquatic Therapy        [] Iontophoresis        ? [] Lymphedema management  [] Women's Health     Other:  [] Vestibular Rehab        [x]  k-tape  []  Needed     Frequency/Duration:  # Days per week: [] 1 day # Weeks: [] 1 week [] 5 weeks     [x] 2 days? [] 2 weeks [x] 6 weeks     [x] 3 days   [] 3 weeks [] 7 weeks     [] 4 days   [] 4 weeks [x] 8 weeks    Rehab Potential: [x] Excellent [] Good [] Fair  [] Poor     Electronically signed by:  Lisa Smith, PT, DPT  [id number]           If you have any questions or concerns, please don't hesitate to call.   Thank you for your referral.      Physician Signature:________________________________Date:__________________  By signing above, therapists plan is approved by physician

## 2018-12-07 ENCOUNTER — HOSPITAL ENCOUNTER (OUTPATIENT)
Dept: PHYSICAL THERAPY | Age: 38
Setting detail: THERAPIES SERIES
Discharge: HOME | End: 2018-12-07
Payer: COMMERCIAL

## 2018-12-07 PROCEDURE — G0283 ELEC STIM OTHER THAN WOUND: HCPCS

## 2018-12-07 PROCEDURE — 97110 THERAPEUTIC EXERCISES: CPT

## 2018-12-07 PROCEDURE — 97140 MANUAL THERAPY 1/> REGIONS: CPT

## 2018-12-07 NOTE — FLOWSHEET NOTE
with paddle  x Elbow Flex/Ext       Shldr Flex/Ext       Shldr aBd/aDd    LE Exercises:  Shldr Horiz aBd/aDd    HR/TR x Shldr IR/ER    Marches x Arm Circles    Squats  x PNF Diagonals    Hamstring Curls x Wall Push Ups    Hip Flexion (SLR) x Figure 8's    Hip aBduction (SLR) x Bilateral Pull Downs    Hip Extension (SLR) x      Hip aDduction (SLR)      Hip Circles x Functional:    Hip IR/Er  Step up forward    TrA Set   Step up lateral     Pelvic Tilts   Step down     Fig 8's   Lunges Forward    LE PNF  Lunges Retro      Lunges Lateral     Balance:   Lower ab curl with noodle     SLS        Tandem Stance       NBOS eyes open  Seated:     NBOS eyes closed  Ankle pumps     Hand to Opposite Knee x Ankle Circles     Fwd Step ups to SLS  Knee Flex/Ext    Lateral Step ups to SLS  Hip aBd/aDd    Stop/Go Gait   Bicycle       Ankle DF/PF      Ankle Inv/Ev    Stretching:       Gastroc/Soleus x     Hamstring  x Noodle:     Knee Flex Stretch  Leg Press x   Piriformis   Noodle Hang at [place]    Hip Flexor  Noodle Hang Deep Water x   SKTC  Noodle Bicycle  x   DKTC       ITB      Quad  Deep Water:    Mid Back   Jog    UT  Jumping Jacks    Post Shoulder  Heel to Toe    Ladder Pull  Hand to Opposite Knee x   Pec Stretch  Rocking Horse      [employer] Skier          Cervical:   Other:     AROM Flexion  Standing lumbar extension  Hands on side of pool    AROM Extension      AROM Side Bending      AROM Rotation      Chin Tucks      Chin Nods        Aquatic Abbreviation Key  B= Belt DB= Dumbells T= Theratube   H= Hydrotone N= Noodles W= Weights   P= Paddles S= Speedo equipment K= Kickboard     Other Therapeutic Activities:  Evaluation completed this date. Discussed risks and benefits of tx with pt - pt agreed with POC. Reviewed mechanics of spine using model and discussed proper posture and body mechanics. Educated pt on centralization principle. Pt instructed on proper sitting posture with towel roll. Written HEP provided for pt.

## 2018-12-12 ENCOUNTER — HOSPITAL ENCOUNTER (OUTPATIENT)
Dept: PHYSICAL THERAPY | Age: 38
Setting detail: THERAPIES SERIES
Discharge: HOME | End: 2018-12-12
Payer: COMMERCIAL

## 2018-12-12 PROCEDURE — 97150 GROUP THERAPEUTIC PROCEDURES: CPT

## 2018-12-12 PROCEDURE — 97113 AQUATIC THERAPY/EXERCISES: CPT
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
## 2018-12-14 ENCOUNTER — HOSPITAL ENCOUNTER (OUTPATIENT)
Dept: PHYSICAL THERAPY | Age: 38
Setting detail: THERAPIES SERIES
Discharge: HOME | End: 2018-12-14
Payer: COMMERCIAL

## 2018-12-14 PROCEDURE — 97110 THERAPEUTIC EXERCISES: CPT

## 2018-12-17 ENCOUNTER — HOSPITAL ENCOUNTER (OUTPATIENT)
Dept: PHYSICAL THERAPY | Age: 38
Setting detail: THERAPIES SERIES
Discharge: HOME | End: 2018-12-17
Payer: COMMERCIAL

## 2018-12-17 PROCEDURE — 97110 THERAPEUTIC EXERCISES: CPT

## 2018-12-18 ENCOUNTER — OFFICE VISIT (OUTPATIENT)
Dept: FAMILY MEDICINE CLINIC | Age: 38
End: 2018-12-18
Payer: COMMERCIAL

## 2018-12-18 VITALS
SYSTOLIC BLOOD PRESSURE: 112 MMHG | OXYGEN SATURATION: 98 % | TEMPERATURE: 97.9 F | HEIGHT: 69 IN | HEART RATE: 81 BPM | WEIGHT: 236.1 LBS | RESPIRATION RATE: 16 BRPM | BODY MASS INDEX: 34.97 KG/M2 | DIASTOLIC BLOOD PRESSURE: 82 MMHG

## 2018-12-18 DIAGNOSIS — R42 DIZZINESS: ICD-10-CM

## 2018-12-18 DIAGNOSIS — I10 ESSENTIAL HYPERTENSION: ICD-10-CM

## 2018-12-18 DIAGNOSIS — M54.42 ACUTE BILATERAL LOW BACK PAIN WITH BILATERAL SCIATICA: ICD-10-CM

## 2018-12-18 DIAGNOSIS — H81.13 BENIGN PAROXYSMAL POSITIONAL VERTIGO DUE TO BILATERAL VESTIBULAR DISORDER: ICD-10-CM

## 2018-12-18 DIAGNOSIS — D64.9 LOW HEMOGLOBIN: ICD-10-CM

## 2018-12-18 DIAGNOSIS — M47.816 ARTHRITIS, LUMBAR SPINE: Primary | ICD-10-CM

## 2018-12-18 DIAGNOSIS — M54.41 ACUTE BILATERAL LOW BACK PAIN WITH BILATERAL SCIATICA: ICD-10-CM

## 2018-12-18 PROCEDURE — G8484 FLU IMMUNIZE NO ADMIN: HCPCS | Performed by: FAMILY MEDICINE

## 2018-12-18 PROCEDURE — G8417 CALC BMI ABV UP PARAM F/U: HCPCS | Performed by: FAMILY MEDICINE

## 2018-12-18 PROCEDURE — 1036F TOBACCO NON-USER: CPT | Performed by: FAMILY MEDICINE

## 2018-12-18 PROCEDURE — G8427 DOCREV CUR MEDS BY ELIG CLIN: HCPCS | Performed by: FAMILY MEDICINE

## 2018-12-18 PROCEDURE — 99214 OFFICE O/P EST MOD 30 MIN: CPT | Performed by: FAMILY MEDICINE

## 2018-12-18 RX ORDER — HYDROCHLOROTHIAZIDE 25 MG/1
TABLET ORAL
Qty: 30 TABLET | Refills: 1 | Status: SHIPPED | OUTPATIENT
Start: 2018-12-18 | End: 2019-02-21 | Stop reason: SDUPTHER

## 2018-12-18 RX ORDER — NAPROXEN 500 MG/1
500 TABLET ORAL 2 TIMES DAILY WITH MEALS
Qty: 20 TABLET | Refills: 0 | Status: SHIPPED | OUTPATIENT
Start: 2018-12-18 | End: 2019-01-18 | Stop reason: CLARIF

## 2018-12-18 ASSESSMENT — PATIENT HEALTH QUESTIONNAIRE - PHQ9
SUM OF ALL RESPONSES TO PHQ9 QUESTIONS 1 & 2: 0
SUM OF ALL RESPONSES TO PHQ QUESTIONS 1-9: 0
1. LITTLE INTEREST OR PLEASURE IN DOING THINGS: 0
SUM OF ALL RESPONSES TO PHQ QUESTIONS 1-9: 0
2. FEELING DOWN, DEPRESSED OR HOPELESS: 0

## 2018-12-18 ASSESSMENT — ENCOUNTER SYMPTOMS
NAUSEA: 0
ABDOMINAL PAIN: 0
ABDOMINAL DISTENTION: 0
BLOOD IN STOOL: 0
BACK PAIN: 1
STRIDOR: 0
SHORTNESS OF BREATH: 0
WHEEZING: 0
DIARRHEA: 0
ANAL BLEEDING: 0
COUGH: 0
APNEA: 0
CHOKING: 0
VOMITING: 0
CONSTIPATION: 0
CHEST TIGHTNESS: 0
RECTAL PAIN: 0

## 2018-12-18 NOTE — PROGRESS NOTES
Subjective:      Patient ID: Jose Mayorga is a 43 y.o. female. Back Pain   Pertinent negatives include no abdominal pain, chest pain, fever, headaches, numbness or weakness. Other   Pertinent negatives include no abdominal pain, chest pain, chills, coughing, diaphoresis, fatigue, fever, headaches, nausea, numbness, rash, vomiting or weakness. Results for Nuris Johnson (MRN [id number]) as of 12/18/2018 16:09   Ref.  Range 10/26/2018 11:18   Sodium Latest Ref Range: 136 - 145 mmol/L 143   Potassium Latest Ref Range: 3.5 - 5.1 mmol/L 4.0   Chloride Latest Ref Range: 99 - 110 mmol/L 102   CO2 Latest Ref Range: 21 - 32 mmol/L 27   BUN Latest Ref Range: 7 - 20 mg/dL 13   Creatinine Latest Ref Range: 0.6 - 1.1 mg/dL 0.7   Anion Gap Latest Ref Range: 3 - 16  14   GFR Non- Latest Ref Range: >60  >60   GFR  Latest Ref Range: >60  >60   Glucose Latest Ref Range: 70 - 99 mg/dL 75   Calcium Latest Ref Range: 8.3 - 10.6 mg/dL 9.6   Total Protein Latest Ref Range: 6.4 - 8.2 g/dL 7.2   Albumin Latest Ref Range: 3.4 - 5.0 g/dL 4.5   Globulin Latest Units: g/dL 2.7   Albumin/Globulin Ratio Latest Ref Range: 1.1 - 2.2  1.7   Alk Phos Latest Ref Range: 40 - 129 U/L 72   ALT Latest Ref Range: 10 - 40 U/L 13   AST Latest Ref Range: 15 - 37 U/L 17   Bilirubin Latest Ref Range: 0.0 - 1.0 mg/dL <0.2   WBC Latest Ref Range: 4.0 - 11.0 K/uL 7.1   RBC Latest Ref Range: 4.00 - 5.20 M/uL 4.54   Hemoglobin Quant Latest Ref Range: 12.0 - 16.0 g/dL 11.8 (L)   Hematocrit Latest Ref Range: 36.0 - 48.0 % 36.2   MCV Latest Ref Range: 80.0 - 100.0 fL 79.7 (L)   MCH Latest Ref Range: 26.0 - 34.0 pg 26.0   MCHC Latest Ref Range: 31.0 - 36.0 g/dL 32.7   MPV Latest Ref Range: 5.0 - 10.5 fL 10.3   RDW Latest Ref Range: 12.4 - 15.4 % 15.1   Platelet Count Latest Ref Range: 135 - 450 K/uL 168   SLIDE REVIEW Unknown see below   PLATELET SLIDE REVIEW Unknown Clumped     11/9/2018  9:44 AM Rob, Swoh Incoming Radiology Results at the table:No.  Denies cp/sob/pnd/ankle edema/dizziness. Allergies   Allergen Reactions    Latex      purities    Amoxicillin     Lisinopril Other (See Comments)     diarrhea    Norvasc [Amlodipine Besylate] Other (See Comments)     GI upset    Vicodin [Hydrocodone-Acetaminophen] Itching and Nausea Only    Flagyl [Metronidazole] Rash       Current Outpatient Prescriptions on File Prior to Visit   Medication Sig Dispense Refill    meclizine (ANTIVERT) 12.5 MG tablet Take 1 tablet by mouth 3 times daily as needed for Dizziness 90 tablet 0    naproxen (NAPROSYN) 500 MG tablet Take 1 tablet by mouth 2 times daily (with meals) 20 tablet 0    methylPREDNISolone (MEDROL, [place],) 4 MG tablet Take by mouth as directed. 1 kit 0    hydrochlorothiazide (HYDRODIURIL) 25 MG tablet TAKE 1 TABLET BY MOUTH DAILY 30 tablet 1     No current facility-administered medications on file prior to visit. Past Medical History:   Diagnosis Date    Anemia     Back pain     Cervical cancer screening 2014;2015    Nml per pt':under care of Dr. Sachin Sullivan hypertension 11/2/2015    Gestational hypertension     Midline low back pain 4/6/2016    Non morbid obesity 11/16/2015           Social History   Substance Use Topics    Smoking status: Never Smoker    Smokeless tobacco: Never Used    Alcohol use 0.6 oz/week     1 Glasses of wine per week      Comment: occasional     History   Drug Use No       Review of Systems   Constitutional: Negative for activity change, appetite change, chills, diaphoresis, fatigue, fever and unexpected weight change. Eyes: Negative for visual disturbance. Respiratory: Negative for apnea, cough, choking, chest tightness, shortness of breath, wheezing and stridor. Cardiovascular: Negative for chest pain, palpitations and leg swelling.    Gastrointestinal: Negative for abdominal distention, abdominal pain, anal bleeding, blood in stool, constipation, diarrhea, nausea, rectal pain and vomiting. Musculoskeletal: Positive for back pain. Skin: Negative for pallor, rash and wound. Neurological: Negative for dizziness, tremors, seizures, syncope, facial asymmetry, speech difficulty, weakness, light-headedness, numbness and headaches. Hematological: Negative for adenopathy. Psychiatric/Behavioral: Negative for sleep disturbance. Objective:   Physical Exam   Constitutional: She is oriented to person, place, and time. She appears well-developed and well-nourished. She is cooperative. She does not have a sickly appearance. No distress. HENT:   Right Ear: Hearing, tympanic membrane, external ear and ear canal normal.   Left Ear: Hearing, tympanic membrane, external ear and ear canal normal.   Nose: Nose normal. Right sinus exhibits no maxillary sinus tenderness and no frontal sinus tenderness. Left sinus exhibits no maxillary sinus tenderness and no frontal sinus tenderness. Mouth/Throat: Uvula is midline, oropharynx is clear and moist and mucous membranes are normal. No oropharyngeal exudate. Hearing intact to nml conversation   Eyes: Pupils are equal, round, and reactive to light. Conjunctivae, EOM and lids are normal.   Neck: Trachea normal and normal range of motion. Neck supple. No JVD present. Carotid bruit is not present. Cardiovascular: Normal rate, regular rhythm, normal heart sounds, intact distal pulses and normal pulses. Exam reveals no gallop. No murmur heard. No ankle edema. Pulmonary/Chest: Effort normal and breath sounds normal.   CTAB,good AE bilaterally   Abdominal: Soft. Normal appearance and bowel sounds are normal. She exhibits no distension and no mass. There is no tenderness. Musculoskeletal:        Right hip: Normal.        Left hip: Normal.        Cervical back: Normal.        Thoracic back: Normal.        Lumbar back: She exhibits tenderness.  She exhibits normal range of motion, no bony tenderness, no swelling, no edema, no deformity, no

## 2018-12-20 ENCOUNTER — HOSPITAL ENCOUNTER (OUTPATIENT)
Dept: PHYSICAL THERAPY | Age: 38
Setting detail: THERAPIES SERIES
Discharge: HOME | End: 2018-12-20
Payer: COMMERCIAL

## 2018-12-20 PROCEDURE — 97113 AQUATIC THERAPY/EXERCISES: CPT

## 2018-12-20 PROCEDURE — 97150 GROUP THERAPEUTIC PROCEDURES: CPT
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
## 2018-12-26 ENCOUNTER — HOSPITAL ENCOUNTER (OUTPATIENT)
Dept: PHYSICAL THERAPY | Age: 38
Setting detail: THERAPIES SERIES
Discharge: HOME | End: 2018-12-26
Payer: COMMERCIAL

## 2018-12-26 PROCEDURE — 97110 THERAPEUTIC EXERCISES: CPT

## 2018-12-27 ENCOUNTER — HOSPITAL ENCOUNTER (EMERGENCY)
Age: 38
Discharge: HOME | End: 2018-12-27
Payer: COMMERCIAL

## 2018-12-27 ENCOUNTER — HOSPITAL ENCOUNTER (OUTPATIENT)
Dept: PHYSICAL THERAPY | Age: 38
Setting detail: THERAPIES SERIES
Discharge: HOME | End: 2018-12-27
Payer: COMMERCIAL

## 2018-12-27 ENCOUNTER — APPOINTMENT (OUTPATIENT)
Dept: GENERAL RADIOLOGY | Age: 38
End: 2018-12-27
Payer: COMMERCIAL

## 2018-12-27 VITALS
HEIGHT: 69 IN | SYSTOLIC BLOOD PRESSURE: 159 MMHG | RESPIRATION RATE: 18 BRPM | BODY MASS INDEX: 34.8 KG/M2 | WEIGHT: 235 LBS | HEART RATE: 84 BPM | DIASTOLIC BLOOD PRESSURE: 87 MMHG | TEMPERATURE: 98.2 F | OXYGEN SATURATION: 100 %

## 2018-12-27 DIAGNOSIS — R68.89 FLU-LIKE SYMPTOMS: Primary | ICD-10-CM

## 2018-12-27 DIAGNOSIS — J02.9 VIRAL PHARYNGITIS: ICD-10-CM

## 2018-12-27 LAB
RAPID INFLUENZA  B AGN: NEGATIVE
RAPID INFLUENZA A AGN: NEGATIVE

## 2018-12-27 PROCEDURE — 99283 EMERGENCY DEPT VISIT LOW MDM: CPT

## 2018-12-27 PROCEDURE — 87804 INFLUENZA ASSAY W/OPTIC: CPT

## 2018-12-27 PROCEDURE — 71046 X-RAY EXAM CHEST 2 VIEWS: CPT

## 2018-12-27 PROCEDURE — 6370000000 HC RX 637 (ALT 250 FOR IP): Performed by: PHYSICIAN ASSISTANT

## 2018-12-27 PROCEDURE — 97113 AQUATIC THERAPY/EXERCISES: CPT

## 2018-12-27 RX ORDER — IBUPROFEN 800 MG/1
800 TABLET ORAL EVERY 8 HOURS PRN
Qty: 30 TABLET | Refills: 0 | Status: SHIPPED | OUTPATIENT
Start: 2018-12-27 | End: 2018-12-31

## 2018-12-27 RX ORDER — BENZONATATE 100 MG/1
100 CAPSULE ORAL 3 TIMES DAILY PRN
Qty: 30 CAPSULE | Refills: 0 | Status: SHIPPED | OUTPATIENT
Start: 2018-12-27 | End: 2019-01-03

## 2018-12-27 RX ORDER — IBUPROFEN 800 MG/1
800 TABLET ORAL ONCE
Status: COMPLETED | OUTPATIENT
Start: 2018-12-27 | End: 2018-12-27

## 2018-12-27 RX ADMIN — IBUPROFEN 800 MG: 800 TABLET, FILM COATED ORAL at 19:12

## 2018-12-27 ASSESSMENT — PAIN DESCRIPTION - PAIN TYPE: TYPE: ACUTE PAIN

## 2018-12-27 ASSESSMENT — PAIN SCALES - GENERAL: PAINLEVEL_OUTOF10: 8

## 2018-12-27 ASSESSMENT — ENCOUNTER SYMPTOMS
VOMITING: 0
VOICE CHANGE: 0
COUGH: 1
NAUSEA: 0
WHEEZING: 0
TROUBLE SWALLOWING: 0
RHINORRHEA: 0
SORE THROAT: 1
SHORTNESS OF BREATH: 0
ABDOMINAL PAIN: 0
DIARRHEA: 0

## 2018-12-27 NOTE — ED PROVIDER NOTES
[address] [facility]  eMERGENCY dEPARTMENT eNCOUnter        Pt Name: Frankie Dumont  MRN: [id number]  [place] 1980  Date of evaluation: 12/27/2018  Provider: Joaquin Swann PA-C  PCP: Tammy Messer MD    This patient was not seen and evaluated by the attending physician No att. providers found. CHIEF COMPLAINT       Chief Complaint   Patient presents with    Pharyngitis     started over weekend, got better, but came back last night worse. fever 100. 1. thick yellow mucus. non-productive cough.  Otalgia       HISTORY OF PRESENT ILLNESS   (Location/Symptom, Timing/Onset, Context/Setting, Quality, Duration, Modifying Factors, Severity)  Note limiting factors. Frankie Dumont is a 43 y.o. female who presents for evaluation of sore throat that initially started over the weekend, not better and then got worse again yesterday with associated ear pain, cough and congestion. Fevers up to 100.1. She has been taking Kate[place] cold and flu without significant symptomatic relief. Difficulty swallowing, drooling or voice changes. No neck pain or stiffness. No chest pain, shortness of breath. No abdominal pain, nausea, vomiting. No rashes. No known sick contacts with similar symptoms. She is otherwise healthy with no other complaints or concerns at this time. Nursing Notes were all reviewed and agreed with or any disagreements were addressed  in the HPI. REVIEW OF SYSTEMS    (2-9 systems for level 4, 10 or more for level 5)     Review of Systems   Constitutional: Positive for fever. Negative for appetite change and chills. HENT: Positive for congestion, ear pain and sore throat. Negative for ear discharge, rhinorrhea, trouble swallowing and voice change. Respiratory: Positive for cough. Negative for shortness of breath and wheezing. Cardiovascular: Negative for chest pain. Gastrointestinal: Negative for abdominal pain, diarrhea, nausea and vomiting.

## 2018-12-28 ENCOUNTER — APPOINTMENT (OUTPATIENT)
Dept: PHYSICAL THERAPY | Age: 38
End: 2018-12-28
Payer: COMMERCIAL

## 2018-12-31 ENCOUNTER — HOSPITAL ENCOUNTER (EMERGENCY)
Age: 38
Discharge: HOME | End: 2018-12-31
Attending: EMERGENCY MEDICINE
Payer: COMMERCIAL

## 2018-12-31 ENCOUNTER — HOSPITAL ENCOUNTER (OUTPATIENT)
Dept: PHYSICAL THERAPY | Age: 38
Setting detail: THERAPIES SERIES
Discharge: HOME | End: 2018-12-31
Payer: COMMERCIAL

## 2018-12-31 ENCOUNTER — TELEPHONE (OUTPATIENT)
Dept: FAMILY MEDICINE CLINIC | Age: 38
End: 2018-12-31

## 2018-12-31 VITALS
RESPIRATION RATE: 20 BRPM | OXYGEN SATURATION: 98 % | SYSTOLIC BLOOD PRESSURE: 137 MMHG | HEART RATE: 88 BPM | TEMPERATURE: 98.5 F | DIASTOLIC BLOOD PRESSURE: 96 MMHG

## 2018-12-31 DIAGNOSIS — J06.9 VIRAL URI WITH COUGH: Primary | ICD-10-CM

## 2018-12-31 LAB
RAPID INFLUENZA  B AGN: NEGATIVE
RAPID INFLUENZA A AGN: NEGATIVE
S PYO AG THROAT QL: NEGATIVE

## 2018-12-31 PROCEDURE — 87880 STREP A ASSAY W/OPTIC: CPT

## 2018-12-31 PROCEDURE — 87804 INFLUENZA ASSAY W/OPTIC: CPT

## 2018-12-31 PROCEDURE — 87081 CULTURE SCREEN ONLY: CPT

## 2018-12-31 PROCEDURE — 99283 EMERGENCY DEPT VISIT LOW MDM: CPT

## 2018-12-31 PROCEDURE — 97110 THERAPEUTIC EXERCISES: CPT

## 2018-12-31 RX ORDER — IBUPROFEN 600 MG/1
600 TABLET ORAL EVERY 6 HOURS PRN
Qty: 30 TABLET | Refills: 0 | Status: SHIPPED | OUTPATIENT
Start: 2018-12-31 | End: 2019-05-24 | Stop reason: CLARIF

## 2018-12-31 RX ORDER — GUAIFENESIN AND DEXTROMETHORPHAN HYDROBROMIDE 600; 30 MG/1; MG/1
1 TABLET, EXTENDED RELEASE ORAL EVERY 12 HOURS PRN
Qty: 20 TABLET | Refills: 0 | Status: SHIPPED | OUTPATIENT
Start: 2018-12-31 | End: 2019-01-18 | Stop reason: CLARIF

## 2018-12-31 ASSESSMENT — PAIN SCALES - GENERAL: PAINLEVEL_OUTOF10: 5

## 2018-12-31 ASSESSMENT — PAIN DESCRIPTION - DESCRIPTORS: DESCRIPTORS: ACHING

## 2018-12-31 ASSESSMENT — PAIN DESCRIPTION - PAIN TYPE: TYPE: ACUTE PAIN

## 2018-12-31 ASSESSMENT — PAIN DESCRIPTION - LOCATION: LOCATION: GENERALIZED

## 2019-01-02 LAB — S PYO THROAT QL CULT: NORMAL

## 2019-01-03 ENCOUNTER — APPOINTMENT (OUTPATIENT)
Dept: PHYSICAL THERAPY | Age: 39
End: 2019-01-03
Payer: COMMERCIAL

## 2019-01-04 ENCOUNTER — HOSPITAL ENCOUNTER (OUTPATIENT)
Dept: PHYSICAL THERAPY | Age: 39
Setting detail: THERAPIES SERIES
Discharge: HOME | End: 2019-01-04
Payer: COMMERCIAL

## 2019-01-04 PROCEDURE — 97110 THERAPEUTIC EXERCISES: CPT

## 2019-01-04 PROCEDURE — 97530 THERAPEUTIC ACTIVITIES: CPT

## 2019-01-07 ENCOUNTER — TELEPHONE (OUTPATIENT)
Dept: FAMILY MEDICINE CLINIC | Age: 39
End: 2019-01-07

## 2019-01-07 ENCOUNTER — HOSPITAL ENCOUNTER (OUTPATIENT)
Dept: PHYSICAL THERAPY | Age: 39
Setting detail: THERAPIES SERIES
Discharge: HOME | End: 2019-01-07
Payer: COMMERCIAL

## 2019-01-07 PROCEDURE — 97110 THERAPEUTIC EXERCISES: CPT

## 2019-01-10 ENCOUNTER — HOSPITAL ENCOUNTER (OUTPATIENT)
Dept: PHYSICAL THERAPY | Age: 39
Setting detail: THERAPIES SERIES
Discharge: HOME | End: 2019-01-10
Payer: COMMERCIAL

## 2019-01-10 PROCEDURE — 97110 THERAPEUTIC EXERCISES: CPT

## 2019-01-18 ENCOUNTER — OFFICE VISIT (OUTPATIENT)
Dept: FAMILY MEDICINE CLINIC | Age: 39
End: 2019-01-18
Payer: COMMERCIAL

## 2019-01-18 ENCOUNTER — HOSPITAL ENCOUNTER (OUTPATIENT)
Dept: PHYSICAL THERAPY | Age: 39
Setting detail: THERAPIES SERIES
Discharge: HOME | End: 2019-01-18
Payer: COMMERCIAL

## 2019-01-18 VITALS
HEIGHT: 69 IN | BODY MASS INDEX: 34.83 KG/M2 | DIASTOLIC BLOOD PRESSURE: 74 MMHG | HEART RATE: 89 BPM | SYSTOLIC BLOOD PRESSURE: 104 MMHG | RESPIRATION RATE: 16 BRPM | WEIGHT: 235.2 LBS

## 2019-01-18 DIAGNOSIS — M54.41 ACUTE BILATERAL LOW BACK PAIN WITH BILATERAL SCIATICA: ICD-10-CM

## 2019-01-18 DIAGNOSIS — E66.9 NON MORBID OBESITY: ICD-10-CM

## 2019-01-18 DIAGNOSIS — M54.42 ACUTE BILATERAL LOW BACK PAIN WITH BILATERAL SCIATICA: ICD-10-CM

## 2019-01-18 DIAGNOSIS — D64.9 LOW HEMOGLOBIN: ICD-10-CM

## 2019-01-18 DIAGNOSIS — I10 ESSENTIAL HYPERTENSION: Primary | ICD-10-CM

## 2019-01-18 DIAGNOSIS — Z13.1 SCREENING FOR DIABETES MELLITUS (DM): ICD-10-CM

## 2019-01-18 DIAGNOSIS — M47.816 ARTHRITIS, LUMBAR SPINE: ICD-10-CM

## 2019-01-18 PROBLEM — R76.8 POSITIVE ANA (ANTINUCLEAR ANTIBODY): Status: ACTIVE | Noted: 2017-12-18

## 2019-01-18 PROCEDURE — G8484 FLU IMMUNIZE NO ADMIN: HCPCS | Performed by: FAMILY MEDICINE

## 2019-01-18 PROCEDURE — 99213 OFFICE O/P EST LOW 20 MIN: CPT | Performed by: FAMILY MEDICINE

## 2019-01-18 PROCEDURE — 1036F TOBACCO NON-USER: CPT | Performed by: FAMILY MEDICINE

## 2019-01-18 PROCEDURE — G8427 DOCREV CUR MEDS BY ELIG CLIN: HCPCS | Performed by: FAMILY MEDICINE

## 2019-01-18 PROCEDURE — G8417 CALC BMI ABV UP PARAM F/U: HCPCS | Performed by: FAMILY MEDICINE

## 2019-01-18 PROCEDURE — 97110 THERAPEUTIC EXERCISES: CPT

## 2019-01-18 RX ORDER — ERGOCALCIFEROL 1.25 MG/1
50000 CAPSULE ORAL WEEKLY
COMMUNITY
Start: 2018-12-28 | End: 2019-04-29 | Stop reason: CLARIF

## 2019-01-18 ASSESSMENT — ENCOUNTER SYMPTOMS
NAUSEA: 0
CHEST TIGHTNESS: 0
STRIDOR: 0
SHORTNESS OF BREATH: 0
DIARRHEA: 0
ABDOMINAL DISTENTION: 0
ANAL BLEEDING: 0
BACK PAIN: 1
VOMITING: 0
APNEA: 0
WHEEZING: 0
CONSTIPATION: 0
COUGH: 0
CHOKING: 0
ABDOMINAL PAIN: 0

## 2019-01-18 ASSESSMENT — PATIENT HEALTH QUESTIONNAIRE - PHQ9
1. LITTLE INTEREST OR PLEASURE IN DOING THINGS: 0
SUM OF ALL RESPONSES TO PHQ QUESTIONS 1-9: 0
SUM OF ALL RESPONSES TO PHQ QUESTIONS 1-9: 0
2. FEELING DOWN, DEPRESSED OR HOPELESS: 0
SUM OF ALL RESPONSES TO PHQ9 QUESTIONS 1 & 2: 0

## 2019-01-21 DIAGNOSIS — D64.9 LOW HEMOGLOBIN: ICD-10-CM

## 2019-01-21 DIAGNOSIS — Z13.1 SCREENING FOR DIABETES MELLITUS (DM): ICD-10-CM

## 2019-01-21 LAB
FERRITIN: 48.4 NG/ML (ref 15–150)
HCT VFR BLD CALC: 36.4 % (ref 36–48)
HEMOGLOBIN: 11.9 G/DL (ref 12–16)
IRON SATURATION: 25 % (ref 15–50)
IRON: 73 UG/DL (ref 37–145)
MCH RBC QN AUTO: 26.6 PG (ref 26–34)
MCHC RBC AUTO-ENTMCNC: 32.6 G/DL (ref 31–36)
MCV RBC AUTO: 81.6 FL (ref 80–100)
PDW BLD-RTO: 15 % (ref 12.4–15.4)
PLATELET # BLD: 138 K/UL (ref 135–450)
PLATELET SLIDE REVIEW: ADEQUATE
PMV BLD AUTO: 10.4 FL (ref 5–10.5)
RBC # BLD: 4.46 M/UL (ref 4–5.2)
TOTAL IRON BINDING CAPACITY: 297 UG/DL (ref 260–445)
WBC # BLD: 8 K/UL (ref 4–11)

## 2019-01-22 ENCOUNTER — TELEPHONE (OUTPATIENT)
Dept: FAMILY MEDICINE CLINIC | Age: 39
End: 2019-01-22

## 2019-01-22 DIAGNOSIS — M54.42 ACUTE BILATERAL LOW BACK PAIN WITH BILATERAL SCIATICA: ICD-10-CM

## 2019-01-22 DIAGNOSIS — M54.41 ACUTE BILATERAL LOW BACK PAIN WITH BILATERAL SCIATICA: ICD-10-CM

## 2019-01-22 DIAGNOSIS — M47.816 ARTHRITIS, LUMBAR SPINE: ICD-10-CM

## 2019-01-22 LAB
ESTIMATED AVERAGE GLUCOSE: 119.8 MG/DL
HBA1C MFR BLD: 5.8 %

## 2019-01-22 RX ORDER — M-VIT,TX,IRON,MINS/CALC/FOLIC 27MG-0.4MG
1 TABLET ORAL DAILY
Qty: 30 TABLET | Refills: 3 | Status: SHIPPED | OUTPATIENT
Start: 2019-01-22 | End: 2019-04-29 | Stop reason: CLARIF

## 2019-01-22 RX ORDER — NAPROXEN 500 MG/1
500 TABLET ORAL 2 TIMES DAILY WITH MEALS
Qty: 20 TABLET | Refills: 0 | Status: SHIPPED | OUTPATIENT
Start: 2019-01-22 | End: 2019-04-29 | Stop reason: CLARIF

## 2019-01-23 ENCOUNTER — HOSPITAL ENCOUNTER (OUTPATIENT)
Dept: PHYSICAL THERAPY | Age: 39
Setting detail: THERAPIES SERIES
Discharge: HOME | End: 2019-01-23
Payer: COMMERCIAL

## 2019-01-23 PROCEDURE — 97530 THERAPEUTIC ACTIVITIES: CPT

## 2019-01-23 PROCEDURE — 97110 THERAPEUTIC EXERCISES: CPT

## 2019-02-01 ENCOUNTER — OFFICE VISIT (OUTPATIENT)
Dept: FAMILY MEDICINE CLINIC | Age: 39
End: 2019-02-01
Payer: COMMERCIAL

## 2019-02-01 VITALS
DIASTOLIC BLOOD PRESSURE: 82 MMHG | HEART RATE: 80 BPM | WEIGHT: 235.5 LBS | RESPIRATION RATE: 16 BRPM | HEIGHT: 69 IN | BODY MASS INDEX: 34.88 KG/M2 | SYSTOLIC BLOOD PRESSURE: 114 MMHG

## 2019-02-01 DIAGNOSIS — M54.41 ACUTE BILATERAL LOW BACK PAIN WITH BILATERAL SCIATICA: ICD-10-CM

## 2019-02-01 DIAGNOSIS — R73.03 PREDIABETES: Primary | ICD-10-CM

## 2019-02-01 DIAGNOSIS — M54.42 ACUTE BILATERAL LOW BACK PAIN WITH BILATERAL SCIATICA: ICD-10-CM

## 2019-02-01 DIAGNOSIS — E66.09 CLASS 1 OBESITY DUE TO EXCESS CALORIES WITH SERIOUS COMORBIDITY AND BODY MASS INDEX (BMI) OF 34.0 TO 34.9 IN ADULT: ICD-10-CM

## 2019-02-01 DIAGNOSIS — D64.9 LOW HEMOGLOBIN: ICD-10-CM

## 2019-02-01 DIAGNOSIS — M47.816 ARTHRITIS, LUMBAR SPINE: ICD-10-CM

## 2019-02-01 DIAGNOSIS — I10 ESSENTIAL HYPERTENSION: ICD-10-CM

## 2019-02-01 PROCEDURE — G8484 FLU IMMUNIZE NO ADMIN: HCPCS | Performed by: FAMILY MEDICINE

## 2019-02-01 PROCEDURE — G8417 CALC BMI ABV UP PARAM F/U: HCPCS | Performed by: FAMILY MEDICINE

## 2019-02-01 PROCEDURE — 1036F TOBACCO NON-USER: CPT | Performed by: FAMILY MEDICINE

## 2019-02-01 PROCEDURE — G8427 DOCREV CUR MEDS BY ELIG CLIN: HCPCS | Performed by: FAMILY MEDICINE

## 2019-02-01 PROCEDURE — 99213 OFFICE O/P EST LOW 20 MIN: CPT | Performed by: FAMILY MEDICINE

## 2019-02-01 ASSESSMENT — ENCOUNTER SYMPTOMS
VOMITING: 0
CHEST TIGHTNESS: 0
STRIDOR: 0
WHEEZING: 0
ABDOMINAL DISTENTION: 0
DIARRHEA: 0
COUGH: 0
ANAL BLEEDING: 0
ABDOMINAL PAIN: 0
SHORTNESS OF BREATH: 0
APNEA: 0
CONSTIPATION: 0
CHOKING: 0
BACK PAIN: 1
NAUSEA: 0

## 2019-02-15 ENCOUNTER — HOSPITAL ENCOUNTER (OUTPATIENT)
Dept: MRI IMAGING | Age: 39
Discharge: HOME | End: 2019-02-15
Payer: COMMERCIAL

## 2019-02-15 DIAGNOSIS — M54.41 ACUTE BILATERAL LOW BACK PAIN WITH BILATERAL SCIATICA: ICD-10-CM

## 2019-02-15 DIAGNOSIS — M47.816 ARTHRITIS, LUMBAR SPINE: ICD-10-CM

## 2019-02-15 DIAGNOSIS — R93.7 ABNORMAL MRI, LUMBAR SPINE: Primary | ICD-10-CM

## 2019-02-15 DIAGNOSIS — M54.41 ACUTE RIGHT-SIDED LOW BACK PAIN WITH RIGHT-SIDED SCIATICA: ICD-10-CM

## 2019-02-15 DIAGNOSIS — M54.42 ACUTE BILATERAL LOW BACK PAIN WITH BILATERAL SCIATICA: ICD-10-CM

## 2019-02-15 PROCEDURE — 72148 MRI LUMBAR SPINE W/O DYE: CPT

## 2019-02-18 ENCOUNTER — TELEPHONE (OUTPATIENT)
Dept: FAMILY MEDICINE CLINIC | Age: 39
End: 2019-02-18

## 2019-02-18 ENCOUNTER — TELEPHONE (OUTPATIENT)
Dept: ORTHOPEDIC SURGERY | Age: 39
End: 2019-02-18

## 2019-02-21 DIAGNOSIS — I10 ESSENTIAL HYPERTENSION: ICD-10-CM

## 2019-02-21 RX ORDER — HYDROCHLOROTHIAZIDE 25 MG/1
TABLET ORAL
Qty: 30 TABLET | Refills: 2 | Status: SHIPPED | OUTPATIENT
Start: 2019-02-21 | End: 2019-05-24 | Stop reason: CLARIF

## 2019-03-01 ENCOUNTER — OFFICE VISIT (OUTPATIENT)
Dept: ORTHOPEDIC SURGERY | Age: 39
End: 2019-03-01
Payer: COMMERCIAL

## 2019-03-01 VITALS
HEIGHT: 69 IN | HEART RATE: 80 BPM | DIASTOLIC BLOOD PRESSURE: 86 MMHG | SYSTOLIC BLOOD PRESSURE: 132 MMHG | WEIGHT: 235.45 LBS | BODY MASS INDEX: 34.87 KG/M2

## 2019-03-01 DIAGNOSIS — M51.26 HNP (HERNIATED NUCLEUS PULPOSUS), LUMBAR: Primary | ICD-10-CM

## 2019-03-01 DIAGNOSIS — M54.16 LUMBAR RADICULOPATHY: ICD-10-CM

## 2019-03-01 DIAGNOSIS — M47.816 SPONDYLOSIS OF LUMBAR REGION WITHOUT MYELOPATHY OR RADICULOPATHY: ICD-10-CM

## 2019-03-01 PROCEDURE — G8484 FLU IMMUNIZE NO ADMIN: HCPCS | Performed by: PHYSICAL MEDICINE & REHABILITATION

## 2019-03-01 PROCEDURE — 99244 OFF/OP CNSLTJ NEW/EST MOD 40: CPT | Performed by: PHYSICAL MEDICINE & REHABILITATION

## 2019-03-01 PROCEDURE — G8417 CALC BMI ABV UP PARAM F/U: HCPCS | Performed by: PHYSICAL MEDICINE & REHABILITATION

## 2019-03-01 PROCEDURE — G8427 DOCREV CUR MEDS BY ELIG CLIN: HCPCS | Performed by: PHYSICAL MEDICINE & REHABILITATION

## 2019-03-29 ENCOUNTER — TELEPHONE (OUTPATIENT)
Dept: ORTHOPEDIC SURGERY | Age: 39
End: 2019-03-29

## 2019-04-01 ENCOUNTER — HOSPITAL ENCOUNTER (OUTPATIENT)
Age: 39
Setting detail: OUTPATIENT SURGERY
Discharge: HOME | End: 2019-04-01
Attending: PHYSICAL MEDICINE & REHABILITATION | Admitting: PHYSICAL MEDICINE & REHABILITATION
Payer: COMMERCIAL

## 2019-04-01 ENCOUNTER — APPOINTMENT (OUTPATIENT)
Dept: GENERAL RADIOLOGY | Age: 39
End: 2019-04-01
Attending: PHYSICAL MEDICINE & REHABILITATION
Payer: COMMERCIAL

## 2019-04-01 ENCOUNTER — TELEPHONE (OUTPATIENT)
Dept: ORTHOPEDIC SURGERY | Age: 39
End: 2019-04-01

## 2019-04-01 VITALS
HEIGHT: 69 IN | HEART RATE: 83 BPM | BODY MASS INDEX: 34.66 KG/M2 | RESPIRATION RATE: 16 BRPM | WEIGHT: 234 LBS | SYSTOLIC BLOOD PRESSURE: 120 MMHG | DIASTOLIC BLOOD PRESSURE: 79 MMHG | OXYGEN SATURATION: 99 % | TEMPERATURE: 99.2 F

## 2019-04-01 PROCEDURE — 3209999900 FLUORO FOR SURGICAL PROCEDURES

## 2019-04-01 PROCEDURE — 99152 MOD SED SAME PHYS/QHP 5/>YRS: CPT | Performed by: PHYSICAL MEDICINE & REHABILITATION

## 2019-04-01 PROCEDURE — 2709999900 HC NON-CHARGEABLE SUPPLY: Performed by: PHYSICAL MEDICINE & REHABILITATION

## 2019-04-01 PROCEDURE — 2500000003 HC RX 250 WO HCPCS: Performed by: PHYSICAL MEDICINE & REHABILITATION

## 2019-04-01 PROCEDURE — 3610000056 HC PAIN LEVEL 4 BASE (NON-OR): Performed by: PHYSICAL MEDICINE & REHABILITATION

## 2019-04-01 PROCEDURE — 6360000002 HC RX W HCPCS: Performed by: PHYSICAL MEDICINE & REHABILITATION

## 2019-04-01 RX ORDER — FENTANYL CITRATE 50 UG/ML
INJECTION, SOLUTION INTRAMUSCULAR; INTRAVENOUS
Status: COMPLETED | OUTPATIENT
Start: 2019-04-01 | End: 2019-04-01

## 2019-04-01 RX ORDER — BUPIVACAINE HYDROCHLORIDE 5 MG/ML
INJECTION, SOLUTION PERINEURAL
Status: COMPLETED | OUTPATIENT
Start: 2019-04-01 | End: 2019-04-01

## 2019-04-01 RX ORDER — LIDOCAINE HYDROCHLORIDE 10 MG/ML
INJECTION, SOLUTION INFILTRATION; PERINEURAL
Status: COMPLETED | OUTPATIENT
Start: 2019-04-01 | End: 2019-04-01

## 2019-04-01 RX ORDER — METHYLPREDNISOLONE ACETATE 80 MG/ML
INJECTION, SUSPENSION INTRA-ARTICULAR; INTRALESIONAL; INTRAMUSCULAR; SOFT TISSUE
Status: COMPLETED | OUTPATIENT
Start: 2019-04-01 | End: 2019-04-01

## 2019-04-01 RX ORDER — MIDAZOLAM HYDROCHLORIDE 1 MG/ML
INJECTION INTRAMUSCULAR; INTRAVENOUS
Status: COMPLETED | OUTPATIENT
Start: 2019-04-01 | End: 2019-04-01

## 2019-04-01 ASSESSMENT — PAIN DESCRIPTION - DESCRIPTORS: DESCRIPTORS: ACHING

## 2019-04-01 ASSESSMENT — PAIN SCALES - GENERAL: PAINLEVEL_OUTOF10: 0

## 2019-04-01 ASSESSMENT — PAIN - FUNCTIONAL ASSESSMENT: PAIN_FUNCTIONAL_ASSESSMENT: 0-10

## 2019-04-01 NOTE — OP NOTE
Patient:  Kayli Lemus  YOB: 1980  Medical Record #:  [id number]   Place: [address] [place] [address]  Date:  4/1/2019   Physician:  Beverly Quinones MD, BRADEN    Procedure: 1. Transforaminal Lumbar Epidural Steroid Injection -  left L5           2. Transforaminal Lumbar Epidural Steroid Injection -  left S1     Pre-Procedure Diagnosis: Lumbar radiculopathy      Post-Procedure Diagnosis: Same    Sedation: Local with 1% Lidocaine 3 ml and 2 mg of IV Versed and 50 mcg IV Fentanyl    EBL: None    Complications: None    Procedure Summary:        The patient was brought to the procedure suite and placed in the prone position. The skin overlying the lumbar spine was prepped and draped in the usual sterile fashion. Using fluoroscopic guidance, the left L5 foramen was identified. Through anesthetized skin, a 22 gauge 5 inch curved tip spinal needle was advanced into the foramen. Isovue M 300 was instilled showing an epidurogram/nerve root outline pattern without evidence of vascular or intrathecal spread. Following which, 50 mg of depomedrol mixed with 1 ml of 0.5% Marcaine was instilled. The needle was removed. Using fluoroscopic guidance, the left S1 foramen was identified. Through anesthetized skin, a 22 gauge 5 inch curved tip spinal needle was advanced into the foramen. Isovue M 300 was instilled showing an epidurogram/nerve root outline pattern without evidence of vascular or intrathecal spread. Following which, 50 mg of depomedrol mixed with 1 ml of 0.5% Marcaine was instilled. The needle was removed and band-aids were applied. The patient was transferred to the post-operative area in stable condition.

## 2019-04-01 NOTE — H&P
HISTORY AND PHYSICAL/PRE-SEDATION ASSESSMENT    Patient:  Zeina Bustillos   :  1980  Medical Record No.:  [id number]   Date:  2019  Physician:  Aurelia Baker M.D. Facility: [address] [place] [address]    HISTORY OF PRESENT ILLNESS:                 The patient is a 43 y.o. female whom presents with low back and left leg pain. Review of the imaging and physical exam of the patient confirmed the pre-procedure diagnosis. After a thorough discussion of risks, benefits and alternatives informed consent was obtained. Past Medical History:   Past Medical History:   Diagnosis Date    Anemia     Arthritis, lumbar spine 2018    Back pain     Cervical cancer screening ;    Nml per pt':under care of Dr. Gio Hickman hypertension 2015    Gestational hypertension     Midline low back pain 2016    Non morbid obesity 2015    Prediabetes 2019      Past Surgical History:     Past Surgical History:   Procedure Laterality Date     SECTION      CHOLECYSTECTOMY      DILATION AND CURETTAGE OF UTERUS  16    FOOT SURGERY Left     Year     HYSTERECTOMY      IA COLONOSCOPY FLX DX W/COLLJ SPEC WHEN PFRMD N/A 2018    COLONOSCOPY performed by Honey Davies MD at [address] [place] [address]       Current Medications:   Prior to Admission medications    Medication Sig Start Date End Date Taking? Authorizing Provider   hydrochlorothiazide (HYDRODIURIL) 25 MG tablet TAKE 1 TABLET BY MOUTH DAILY 19  Yes Dominga Hernandez MD   Multiple Vitamins-Minerals (THERAPEUTIC MULTIVITAMIN-MINERALS) tablet Take 1 tablet by mouth daily Multivitamins 1 daily.  19  Dominga Hernandez MD   naproxen (NAPROSYN) 500 MG tablet Take 1 tablet by mouth 2 times daily (with meals) 19  Dominga Hernandez MD   vitamin D (ERGOCALCIFEROL) 29006 units CAPS capsule Take 50,000 Units by mouth once a week 18   Historical Provider, MD ibuprofen (ADVIL;MOTRIN) 600 MG tablet Take 1 tablet by mouth every 6 hours as needed for Pain 12/31/18   Gerardo Carpenter MD     Allergies:  Latex; Acetaminophen; Amoxicillin; Hydrocodone; Imidazole antifungals; Lisinopril; Norvasc [amlodipine besylate]; and Vicodin [hydrocodone-acetaminophen]  Social History:    reports that she has never smoked. She has never used smokeless tobacco. She reports that she drinks about 0.6 oz of alcohol per week. She reports that she does not use drugs. Family History:   Family History   Problem Relation Age of Onset    High Blood Pressure Mother     Diabetes Mother     Other Father     Rheum Arthritis Neg Hx     Osteoarthritis Neg Hx     Asthma Neg Hx     Cancer Neg Hx     High Cholesterol Neg Hx     Hypertension Neg Hx     Migraines Neg Hx     Rashes/Skin Problems Neg Hx     Seizures Neg Hx     Stroke Neg Hx     Thyroid Disease Neg Hx        Vitals: Blood pressure (!) 149/104, pulse 80, temperature 99.2 °F (37.3 °C), resp. rate 16, height 5' 9\" (1.753 m), weight 234 lb (106.1 kg), last menstrual period 03/20/2018, SpO2 100 %, unknown if currently breastfeeding. PHYSICAL EXAM:including affected areas  HENT: Airway patent and reviewed  Cardiovascular: Normal rate, regular rhythm, normal heart sounds. Pulmonary/Chest: No wheezes. No rhonchi. No rales. Abdominal: Soft. Bowel sounds are normal. No distension. Extremities: Moves all extremities equally  Cervical and Lumbar Spine: Painful range of motion, no midline tenderness       Diagnosis:Lumbar radiculopathy  M51.26, M54.16, M47.816    Plan: Proceed with planned procedure      ASA CLASS:         []   I. Normal, healthy adult           [x]   II.  Mild systemic disease            []   III.   Severe systemic disease      Mallampati: Mallampati Class II - (soft palate, fauces & uvula are visible)      Sedation plan:   [x]  Local              []  Minimal                  []  General anesthesia    Patient's

## 2019-04-05 ENCOUNTER — OFFICE VISIT (OUTPATIENT)
Dept: ORTHOPEDIC SURGERY | Age: 39
End: 2019-04-05
Payer: COMMERCIAL

## 2019-04-05 ENCOUNTER — TELEPHONE (OUTPATIENT)
Dept: ORTHOPEDIC SURGERY | Age: 39
End: 2019-04-05

## 2019-04-05 VITALS
SYSTOLIC BLOOD PRESSURE: 132 MMHG | WEIGHT: 233.91 LBS | DIASTOLIC BLOOD PRESSURE: 86 MMHG | HEIGHT: 69 IN | BODY MASS INDEX: 34.64 KG/M2 | HEART RATE: 80 BPM

## 2019-04-05 DIAGNOSIS — S39.012A STRAIN OF LUMBAR REGION, INITIAL ENCOUNTER: Primary | ICD-10-CM

## 2019-04-05 DIAGNOSIS — M62.830 BACK MUSCLE SPASM: ICD-10-CM

## 2019-04-05 PROCEDURE — 99214 OFFICE O/P EST MOD 30 MIN: CPT | Performed by: PHYSICAL MEDICINE & REHABILITATION

## 2019-04-05 PROCEDURE — 1036F TOBACCO NON-USER: CPT | Performed by: PHYSICAL MEDICINE & REHABILITATION

## 2019-04-05 PROCEDURE — G8428 CUR MEDS NOT DOCUMENT: HCPCS | Performed by: PHYSICAL MEDICINE & REHABILITATION

## 2019-04-05 PROCEDURE — G8417 CALC BMI ABV UP PARAM F/U: HCPCS | Performed by: PHYSICAL MEDICINE & REHABILITATION

## 2019-04-05 RX ORDER — GABAPENTIN 300 MG/1
300 CAPSULE ORAL NIGHTLY
Qty: 30 CAPSULE | Refills: 0 | Status: SHIPPED | OUTPATIENT
Start: 2019-04-05 | End: 2019-08-09

## 2019-04-05 RX ORDER — TIZANIDINE 4 MG/1
4 TABLET ORAL 2 TIMES DAILY
Qty: 60 TABLET | Refills: 0 | Status: SHIPPED | OUTPATIENT
Start: 2019-04-05 | End: 2019-06-07

## 2019-04-05 RX ORDER — METHYLPREDNISOLONE 4 MG/1
TABLET ORAL
Qty: 1 KIT | Refills: 0 | Status: SHIPPED | OUTPATIENT
Start: 2019-04-05 | End: 2019-04-29 | Stop reason: CLARIF

## 2019-04-05 NOTE — PROGRESS NOTES
Follow up: Shilo Ma  1980  [id number]      CHIEF COMPLAINT:    Chief Complaint   Patient presents with    Back Problem     TF L5, S1 4/1/2019. c/o worsening symptoms. HISTORY OF PRESENT ILLNESS:  Ms. Kanchan Bernal is a 43 y.o. female returns for a follow up visit for multiple medical problems. Her current presenting problems are   1. Strain of lumbar region, initial encounter    2. Back muscle spasm    . As per information/history obtained from the PADT(patient assessment and documentation tool) - She complains of pain in the lower back with radiation to the hips Left She rates the pain 10/10 and describes it as sharp on the right side (Opposite to where the injection was). Pain is made worse by: everything. She denies side effects from the current pain regimen. Patient reports that since the last follow up visit the physical functioning is worse, family/social relationships are worse, mood is worse and sleep patterns are worse, and that the overall functioning is worse. Patient denies neurological bowel or bladder. Patient underwent a transforaminal left L5, S1 injection on 4/1/2019. She states that since 3pm on 4/4/2019 her pain became increasingly worse on the right side. She describes it as a cramping feeling. The pain woke her up last night. Patient is unable to sit on her left side due to pain. It is also hard for her to stand due to pain. She denies any new injuries or triggers. She also does not ambulate with any assistive devices in the office today.      Associated signs and symptoms:   Neurogenic bowel or bladder symptoms:  no   Perceived weakness:  yes   Difficulty walking:  yes              Past Medical History:   Past Medical History:   Diagnosis Date    Anemia     Arthritis, lumbar spine 12/18/2018    Back pain     Cervical cancer screening 2014;2015    Nml per pt':under care of Dr. Geovanna Sabillon hypertension 11/2/2015    Gestational hypertension     Midline 2+.  Clonus absent bilaterally at the feet. No pathological reflexes are noted. · Gait & station: slow keena, difficulty changing positions, non-antalgic  · Additional Examinations:  · RIGHT UPPER EXTREMITY:  Inspection/examination of the right upper extremity does not show any tenderness, deformity or injury. Range of motion is unremarkable and pain-free. There is no gross instability. There are no rashes, ulcerations or lesions. Strength and tone are normal. No atrophy or abnormal movements are noted. · LEFT UPPER EXTREMITY: Inspection/examination of the left upper extremity does not show any tenderness, deformity or injury. Range of motion is unremarkable and pain-free. There is no gross instability. There are no rashes, ulcerations or lesions. Strength and tone are normal. No atrophy or abnormal movements are noted. LUMBAR/SACRAL EXAMINATION:  · Inspection: Local inspection shows no step-off or bruising. Lumbar alignment is normal. No instability is noted. · Palpation:   No evidence of tenderness at the midline. Lumbar paraspinal tenderness: Mild L4/5 and L5/S1 tenderness  Bursal tenderness No tenderness bilaterally  There is no paraspinal spasm. · Range of Motion: limited by 50% in all planes due to pain  · Strength:   Strength testing is 5/5 in all muscle groups tested. · Special Tests:   Straight leg raise and crossed SLR negative. · Skin: There are no rashes, ulcerations or lesions. · Reflexes: Reflexes are symmetrically 1+ at the patellar and ankle tendons. Clonus absent bilaterally at the feet. · Gait & station: as above  · Additional Examinations:  · RIGHT LOWER EXTREMITY: Inspection/examination of the right lower extremity does not show any tenderness, deformity or injury. Range of motion is normal and pain-free. There is no gross instability. There are no rashes, ulcerations or lesions. Strength and tone are normal. No atrophy or abnormal movements are noted.   · LEFT LOWER EXTREMITY: Inspection/examination of the left lower extremity does not show any tenderness, deformity or injury. Range of motion is normal and pain-free. There is no gross instability. There are no rashes, ulcerations or lesions. Strength and tone are normal. No atrophy or abnormal movements are noted. Diagnostic Testing:    MR Lumbar spine shows 2/15/19  2: There is no significant disc herniation, spinal canal stenosis or   neural foraminal narrowing.       L2-L3: There is no significant disc herniation, spinal canal stenosis or   neural foraminal narrowing.       L3-L4: There is no significant disc herniation, spinal canal stenosis or   neural foraminal narrowing.       L4-L5: There is no significant disc herniation, spinal canal stenosis or   neural foraminal narrowing.       L5-S1: Mild facet hypertrophy.  Small central left/subarticular disc   extrusion with slight inferior migration.  Minimal neural foraminal   narrowing.  No significant spinal canal stenosis.  No evidence of descending   S1 nerve root displacement or compression. Results for orders placed or performed in visit on 01/21/19   CBC   Result Value Ref Range    WBC 8.0 4.0 - 11.0 K/uL    RBC 4.46 4.00 - 5.20 M/uL    Hemoglobin 11.9 (L) 12.0 - 16.0 g/dL    Hematocrit 36.4 36.0 - 48.0 %    MCV 81.6 80.0 - 100.0 fL    MCH 26.6 26.0 - 34.0 pg    MCHC 32.6 31.0 - 36.0 g/dL    RDW 15.0 12.4 - 15.4 %    Platelets 544 983 - 564 K/uL    MPV 10.4 5.0 - 10.5 fL    PLATELET SLIDE REVIEW Adequate    Ferritin   Result Value Ref Range    Ferritin 48.4 15.0 - 150.0 ng/mL   IRON AND TIBC   Result Value Ref Range    Iron 73 37 - 145 ug/dL    TIBC 297 260 - 445 ug/dL    Iron Saturation 25 15 - 50 %   Hemoglobin A1C   Result Value Ref Range    Hemoglobin A1C 5.8 See comment %    eAG 119.8 mg/dL     Impression:       1. Strain of lumbar region, initial encounter    2.  Back muscle spasm        Plan:  Clinical Course: New right side back and hip pain    I discussed the diagnosis and the treatment options with Shelia Mauricio today. In Summary:  The various treatment options were outlined and discussed with Shelia Mauricio including:  Conservative care options: physical therapy, ice, medications, bracing, and activity modification. The indications for therapeutic injections. The indications for additional imaging/laboratory studies. The indications for (possible future) interventions. After considering the various options discussed, Shelia Mauricio elected to pursue a course of treatment that includes the followin. Medications:  I will prescribe a medrol dose pack to help with the inflammatory component of pain. Risks, benefits and alternatives were discussed. Recommended to stop any NSAIDs to reduce risk of GI bleed during the course of the dose pack. Add Zanaflex 4 mg po bid. Counseled not to drive after taking the medication. Add neurontin 300 mg po qhs. She is counseled not to take this medicine Drive  2. PT:  Encouraged to continue with HEP. 3. Further studies:  No further studies. 4. Interventional:  Her current pain is in the right buttock area and she underwent a left L5 and S1 transforaminal epidural steroid injection. She reports she no longer has any left leg pain and left gluteal pain. 5. Follow up:  2-3 weeks      Shelia Mauricio was instructed to call the office if her symptoms worsen or if new symptoms appear prior to the next scheduled visit. She is specifically instructed to contact the office between now & her scheduled appointment if she has concerns related to her condition or if she needs assistance in scheduling the above tests. She is welcome to call for an appointment sooner if she has any additional concerns or questions. Lobito Pat ATC, am scribing for and in the presence of Dr. Deb Rivas. 19 11:40 AM Georgette Brown ATC    I, Dr. Chacha Doe.  Nirav, personally performed the services described in this documentation as scribed by BENJAMIN Felipe in my presence and it is both accurate and complete. Beth Melendrez. Sam Cline MD, BRADEN, [facility]  Board Certified in [address] [place] [address] Certified and Fellowship Trained in [facility] ([facility])             This dictation was performed with a verbal recognition program [facility]) and it was checked for errors. It is possible that there are still dictated errors within this office note. If so, please bring any errors to my attention for an addendum. All efforts were made to ensure that this office note is accurate.

## 2019-04-05 NOTE — TELEPHONE ENCOUNTER
Called and spoke with patient. She is having new pain. New spasms. She underwent L5 TF on Monday 4/1/2019  Patient was scheduled for an ov.

## 2019-04-12 ENCOUNTER — OFFICE VISIT (OUTPATIENT)
Dept: ORTHOPEDIC SURGERY | Age: 39
End: 2019-04-12
Payer: COMMERCIAL

## 2019-04-12 VITALS
HEIGHT: 69 IN | BODY MASS INDEX: 34.96 KG/M2 | DIASTOLIC BLOOD PRESSURE: 81 MMHG | SYSTOLIC BLOOD PRESSURE: 135 MMHG | WEIGHT: 236 LBS

## 2019-04-12 DIAGNOSIS — M54.16 LUMBAR RADICULOPATHY: ICD-10-CM

## 2019-04-12 DIAGNOSIS — M51.26 HNP (HERNIATED NUCLEUS PULPOSUS), LUMBAR: Primary | ICD-10-CM

## 2019-04-12 DIAGNOSIS — M47.816 SPONDYLOSIS WITHOUT MYELOPATHY OR RADICULOPATHY, LUMBAR REGION: ICD-10-CM

## 2019-04-12 PROCEDURE — G8427 DOCREV CUR MEDS BY ELIG CLIN: HCPCS | Performed by: PHYSICAL MEDICINE & REHABILITATION

## 2019-04-12 PROCEDURE — 99213 OFFICE O/P EST LOW 20 MIN: CPT | Performed by: PHYSICAL MEDICINE & REHABILITATION

## 2019-04-12 PROCEDURE — 1036F TOBACCO NON-USER: CPT | Performed by: PHYSICAL MEDICINE & REHABILITATION

## 2019-04-12 PROCEDURE — G8417 CALC BMI ABV UP PARAM F/U: HCPCS | Performed by: PHYSICAL MEDICINE & REHABILITATION

## 2019-04-12 NOTE — LETTER
Please schedule the following with:     Date:   2019 @ 10:00     Account: [id number]  Patient: Rufina Valles    : 1980  Address:  [place] [address]    Phone (H):  [phone] (home)      ----------------------------------------------------------------------------------------------  Diagnosis:     ICD-10-CM    1. HNP (herniated nucleus pulposus), lumbar M51.26    2. Lumbar radiculopathy M54.16          Levels:L5, S1  Procedure type TRANSFORAMINAL GLENN  Side LEFT  CPT Codes 79231, 38761    ----------------------------------------------------------------------------------------------  Injection #   [id number]    Attending Physician       Louis Silver.  Luzmaria Alcala MD.      ----------------------------------------------------------------------------------------------  Injection Scheduled For:    At:    [address]    Pre-Cert#    [employer]     Pre-Cert#    Comments or Special instructions: PATIENT REQUESTS IV SEDATION    · Infection control  · Tested positive for MRSA in past 12 months:  no  · Tested positive for MSSA \"staph infection\" in past 12 months: no  · Tested positive for VRE (Vancomycin Resistant Enterococci) in past 12 months:   no  · Currently on any antibiotics for an infection: no  · Anticoagulants:  · On a blood thinner:  no   · Any history of bleeding disorder: no   · Advanced Liver disease: no   · Advanced Renal disease: no   · Glaucoma: no   · Diabetes: no     Sedation:  Yes  -----------------------------------------------------------------------------------------------  Allergies   Allergen Reactions    Latex Itching and Rash     Condoms only; gloves seem to be ok  purities  purities    Amoxicillin Itching     Patient has received PCN IV without reported issues    Hydrocodone Hives    Imidazole Antifungals Other (See Comments)    Lisinopril Other (See Comments)     diarrhea    Norvasc [Amlodipine Besylate] Other (See Comments)     GI upset  Vicodin [Hydrocodone-Acetaminophen] Itching and Nausea Only

## 2019-04-12 NOTE — PROGRESS NOTES
Pressure Mother     Diabetes Mother     Other Father    [facility] Rheum Arthritis Neg Hx     Osteoarthritis Neg Hx     Asthma Neg Hx     Cancer Neg Hx     High Cholesterol Neg Hx     Hypertension Neg Hx     Migraines Neg Hx     Rashes/Skin Problems Neg Hx     Seizures Neg Hx     Stroke Neg Hx     Thyroid Disease Neg Hx        REVIEW OF SYSTEMS:   CONSTITUTIONAL: Denies unexplained weight loss, fevers, chills or fatigue  NEUROLOGICAL: Denies unsteady gait or progressive weakness  MUSCULOSKELETAL: Denies joint swelling or redness  GI: Denies nausea, vomiting, diarrhea   : Denies bowel or bladder issues       PHYSICAL EXAM:    Vitals: Blood pressure 135/81, height 5' 9\" (1.753 m), weight 236 lb (107 kg), last menstrual period 03/20/2018, unknown if currently breastfeeding. GENERAL EXAM:  · General Apparence: Patient is adequately groomed with no evidence of malnutrition. · Psychiatric: Orientation: The patient is oriented to time, place and person. The patient's mood and affect are appropriate   · Vascular: Examination reveals no swelling and palpation reveals no tenderness in upper or lower extremities. Good capillary refill. · The lymphatic examination of the neck, axillae and groin reveals all areas to be without enlargement or induration  · Sensation is intact without deficit in the upper and lower extremities to light touch and pinprick  · Coordination of the upper and lower extremities are normal.  · Additional Examinations:  · RIGHT UPPER EXTREMITY:  Inspection/examination of the right upper extremity does not show any tenderness, deformity or injury. Range of motion is unremarkable and pain-free. There is no gross instability. There are no rashes, ulcerations or lesions. Strength and tone are normal. No atrophy or abnormal movements are noted. · LEFT UPPER EXTREMITY: Inspection/examination of the left upper extremity does not show any tenderness, deformity or injury.  Range of motion is unremarkable and pain-free. There is no gross instability. There are no rashes, ulcerations or lesions. Strength and tone are normal. No atrophy or abnormal movements are noted. LUMBAR/SACRAL EXAMINATION:  · Inspection: Local inspection shows no step-off or bruising. Lumbar alignment is normal. No instability is noted. · Palpation:   No evidence of tenderness at the midline. Lumbar paraspinal tenderness: Mild L4/5 and L5/S1 tenderness  Bursal tenderness No tenderness bilaterally  There is no paraspinal spasm. · Range of Motion: limited by 25% in all planes due to pain  · Strength:   Strength testing is 5/5 in all muscle groups tested. · Special Tests:   Straight leg raise and crossed SLR negative. Yung's testing is negative bilaterally. FADIR's testing is negative bilaterally. · Skin: There are no rashes, ulcerations or lesions. · Reflexes: Reflexes are symmetrically 2+ at the patellar and ankle tendons. Clonus absent bilaterally at the feet. · Gait & station: antalgic on the left  · Additional Examinations:  · RIGHT LOWER EXTREMITY: Inspection/examination of the right lower extremity does not show any tenderness, deformity or injury. Range of motion is normal and pain-free. There is no gross instability. There are no rashes, ulcerations or lesions. Strength and tone are normal. No atrophy or abnormal movements are noted. · LEFT LOWER EXTREMITY:  Inspection/examination of the left lower extremity does not show any tenderness, deformity or injury. Range of motion is normal and pain-free. There is no gross instability. There are no rashes, ulcerations or lesions. Strength and tone are normal. No atrophy or abnormal movements are noted.       Diagnostic Testing:    MR Lumbar spine shows 2/15/19  MRI Lumbar Spine WO Contrast   Status: Final result   Order Providers     Authorizing Encounter Billing   Leticia Smyth MD [facility] MRI RM 1 Bebeto Molina MD          Signed by     Signed Date/Time  Phone TISSUES: No paraspinal mass identified.       L1-L2: There is no significant disc herniation, spinal canal stenosis or   neural foraminal narrowing.       L2-L3: There is no significant disc herniation, spinal canal stenosis or   neural foraminal narrowing.       L3-L4: There is no significant disc herniation, spinal canal stenosis or   neural foraminal narrowing.       L4-L5: There is no significant disc herniation, spinal canal stenosis or   neural foraminal narrowing.       L5-S1: Mild facet hypertrophy.  Small central left/subarticular disc   extrusion with slight inferior migration.  Minimal neural foraminal   narrowing.  No significant spinal canal stenosis.  No evidence of descending   S1 nerve root displacement or compression. Results for orders placed or performed in visit on 01/21/19   CBC   Result Value Ref Range    WBC 8.0 4.0 - 11.0 K/uL    RBC 4.46 4.00 - 5.20 M/uL    Hemoglobin 11.9 (L) 12.0 - 16.0 g/dL    Hematocrit 36.4 36.0 - 48.0 %    MCV 81.6 80.0 - 100.0 fL    MCH 26.6 26.0 - 34.0 pg    MCHC 32.6 31.0 - 36.0 g/dL    RDW 15.0 12.4 - 15.4 %    Platelets 873 972 - 361 K/uL    MPV 10.4 5.0 - 10.5 fL    PLATELET SLIDE REVIEW Adequate    Ferritin   Result Value Ref Range    Ferritin 48.4 15.0 - 150.0 ng/mL   IRON AND TIBC   Result Value Ref Range    Iron 73 37 - 145 ug/dL    TIBC 297 260 - 445 ug/dL    Iron Saturation 25 15 - 50 %   Hemoglobin A1C   Result Value Ref Range    Hemoglobin A1C 5.8 See comment %    eAG 119.8 mg/dL     Impression:       1. HNP (herniated nucleus pulposus), lumbar    2. Lumbar radiculopathy    3. Spondylosis without myelopathy or radiculopathy, lumbar region        Plan:  Clinical Course: Above diagnoses show some improvement    I discussed the diagnosis and the treatment options with Charlee Ureña today.      In Summary:  The various treatment options were outlined and discussed with Charlee Ureña including:  Conservative care options: physical therapy, ice, medications, bracing, and activity modification. The indications for therapeutic injections. The indications for additional imaging/laboratory studies. The indications for (possible future) interventions. After considering the various options discussed, Dora Pryor elected to pursue a course of treatment that includes the followin. Medications:  Continue anti-inflammatories with appropriate GI Precautions including to stop if develop dark tarry stools or GI upset and to take with food. 2. PT:  Encouraged to continue with HEP. 3. Further studies:  No further studies. 4. Interventional:  We discussed pursuing a Left L5 and S1 TF epidural steroid injection to address the pain. Radiologic imaging and symptoms confirm the pain etiology. Risks, benefits and alternatives of interventional options were discussed. These include and are not limited to bleeding, infection, spinal headache, nerve injury and lack of pain relief. The patient verbalized understanding and would like to proceed. The patient will be scheduled accordingly. 5. Follow up:  4-6 weeks      Dora Pryor was instructed to call the office if her symptoms worsen or if new symptoms appear prior to the next scheduled visit. She is specifically instructed to contact the office between now & her scheduled appointment if she has concerns related to her condition or if she needs assistance in scheduling the above tests. She is welcome to call for an appointment sooner if she has any additional concerns or questions. Carol Sotelo. Virgle Boas, MD, BRADEN, [facility]  Board Certified in [address] [place] [address] Certified and Fellowship Trained in [facility] ([facility])             This dictation was performed with a verbal recognition program [facility]) and it was checked for errors. It is possible that there are still dictated errors within this office note.  If so, please bring any errors to my attention for an addendum. All efforts were made to ensure that this office note is accurate.

## 2019-04-19 ENCOUNTER — TELEPHONE (OUTPATIENT)
Dept: FAMILY MEDICINE CLINIC | Age: 39
End: 2019-04-19

## 2019-04-19 NOTE — TELEPHONE ENCOUNTER
Needs prompt evaluation today at local ER due to fluctuating sugars & fainting.   F/u with me after discharge from the hospital.

## 2019-04-19 NOTE — TELEPHONE ENCOUNTER
Prior message for eval today is still advised for her safety. Pls schedule 30min f/u appt with me at her earliest convenience.

## 2019-04-29 ENCOUNTER — OFFICE VISIT (OUTPATIENT)
Dept: FAMILY MEDICINE CLINIC | Age: 39
End: 2019-04-29
Payer: COMMERCIAL

## 2019-04-29 VITALS
RESPIRATION RATE: 16 BRPM | SYSTOLIC BLOOD PRESSURE: 110 MMHG | WEIGHT: 234.6 LBS | OXYGEN SATURATION: 98 % | BODY MASS INDEX: 34.75 KG/M2 | HEART RATE: 82 BPM | TEMPERATURE: 98.4 F | HEIGHT: 69 IN | DIASTOLIC BLOOD PRESSURE: 88 MMHG

## 2019-04-29 DIAGNOSIS — E66.09 CLASS 1 OBESITY DUE TO EXCESS CALORIES WITHOUT SERIOUS COMORBIDITY WITH BODY MASS INDEX (BMI) OF 34.0 TO 34.9 IN ADULT: ICD-10-CM

## 2019-04-29 DIAGNOSIS — Z13.29 SCREENING FOR THYROID DISORDER: ICD-10-CM

## 2019-04-29 DIAGNOSIS — I10 ESSENTIAL HYPERTENSION: ICD-10-CM

## 2019-04-29 DIAGNOSIS — Z13.0 SCREENING FOR DEFICIENCY ANEMIA: ICD-10-CM

## 2019-04-29 DIAGNOSIS — E16.2 HYPOGLYCEMIA: Primary | ICD-10-CM

## 2019-04-29 PROCEDURE — G8427 DOCREV CUR MEDS BY ELIG CLIN: HCPCS | Performed by: FAMILY MEDICINE

## 2019-04-29 PROCEDURE — G8417 CALC BMI ABV UP PARAM F/U: HCPCS | Performed by: FAMILY MEDICINE

## 2019-04-29 PROCEDURE — 99214 OFFICE O/P EST MOD 30 MIN: CPT | Performed by: FAMILY MEDICINE

## 2019-04-29 PROCEDURE — 1036F TOBACCO NON-USER: CPT | Performed by: FAMILY MEDICINE

## 2019-04-29 ASSESSMENT — ENCOUNTER SYMPTOMS
APNEA: 0
DIARRHEA: 0
RECTAL PAIN: 0
ABDOMINAL PAIN: 0
CHOKING: 0
ABDOMINAL DISTENTION: 0
STRIDOR: 0
VOMITING: 0
BLOOD IN STOOL: 0
WHEEZING: 0
CONSTIPATION: 0
COUGH: 0
CHEST TIGHTNESS: 0
SHORTNESS OF BREATH: 0
ANAL BLEEDING: 0
NAUSEA: 0

## 2019-04-29 ASSESSMENT — PATIENT HEALTH QUESTIONNAIRE - PHQ9
SUM OF ALL RESPONSES TO PHQ9 QUESTIONS 1 & 2: 0
SUM OF ALL RESPONSES TO PHQ QUESTIONS 1-9: 0
SUM OF ALL RESPONSES TO PHQ QUESTIONS 1-9: 0
2. FEELING DOWN, DEPRESSED OR HOPELESS: 0
1. LITTLE INTEREST OR PLEASURE IN DOING THINGS: 0

## 2019-04-29 NOTE — PROGRESS NOTES
glcSubjective:      Patient ID: Mary Tellez is a 43 y.o. female. HPI  New problem:reports mild-moderate fluctuating sugars. Lowest was 68 approx 40mins after eating breakfast:had checked at work. Fasting sugars 70s. Highest has been 112 after eating(approx 50mins postprandial). Known hx of prediabetes:diet managed. +fhx DM2(mother). Associated w/sweatiness-weakness when sugar is low. .   Improving/worsening factors:none reported. Has changed her diet over the past 1month with assistance from her dietician at work. Consumes 2000calories/day. Denies polyuria/polyphagia/polydipsia/insulin or other diabetes medication use. HTN:doing well:Takes medication w/o side effects. No new associated or improving factors. Adds salt to food at the table:No.  Denies cp/sob/pnd/ankle edema/dizziness. Allergies   Allergen Reactions    Latex Itching and Rash     Condoms only; gloves seem to be ok  purities  purities    Amoxicillin Itching     Patient has received PCN IV without reported issues    Hydrocodone Hives    Imidazole Antifungals Other (See Comments)    Lisinopril Other (See Comments)     diarrhea    Norvasc [Amlodipine Besylate] Other (See Comments)     GI upset    Vicodin [Hydrocodone-Acetaminophen] Itching and Nausea Only       Current Outpatient Medications on File Prior to Visit   Medication Sig Dispense Refill    vitamin D (CHOLECALCIFEROL) 1000 UNIT TABS tablet Take 1,000 Units by mouth daily      tiZANidine (ZANAFLEX) 4 MG tablet Take 1 tablet by mouth 2 times daily 60 tablet 0    gabapentin (NEURONTIN) 300 MG capsule Take 1 capsule by mouth nightly for 30 days. Intended supply: 90 days 30 capsule 0    hydrochlorothiazide (HYDRODIURIL) 25 MG tablet TAKE 1 TABLET BY MOUTH DAILY 30 tablet 2    ibuprofen (ADVIL;MOTRIN) 600 MG tablet Take 1 tablet by mouth every 6 hours as needed for Pain 30 tablet 0     No current facility-administered medications on file prior to visit.         Past Medical History:   Diagnosis Date    Anemia     Arthritis, lumbar spine 12/18/2018    Back pain     Cervical cancer screening 2014;2015    Nml per pt':under care of Dr. Nate Otoole hypertension 11/2/2015    Gestational hypertension     Midline low back pain 4/6/2016    Non morbid obesity 11/16/2015    Prediabetes 2/1/2019           Social History     Tobacco Use    Smoking status: Never Smoker    Smokeless tobacco: Never Used   Substance Use Topics    Alcohol use: Yes     Alcohol/week: 0.6 oz     Types: 1 Glasses of wine per week     Comment: occasional    Drug use: No     Social History     Substance and Sexual Activity   Drug Use No           Review of Systems   Constitutional: Negative for activity change, appetite change, chills, diaphoresis, fatigue, fever and unexpected weight change. Eyes: Negative for visual disturbance. Respiratory: Negative for apnea, cough, choking, chest tightness, shortness of breath, wheezing and stridor. Cardiovascular: Negative for chest pain, palpitations and leg swelling. Gastrointestinal: Negative for abdominal distention, abdominal pain, anal bleeding, blood in stool, constipation, diarrhea, nausea, rectal pain and vomiting. Endocrine: Negative for cold intolerance, heat intolerance, polydipsia, polyphagia and polyuria. Genitourinary: Negative for difficulty urinating. Skin: Negative for pallor, rash and wound. Neurological: Negative. Psychiatric/Behavioral: Negative for sleep disturbance. Objective:   Physical Exam   Constitutional: She is oriented to person, place, and time. She appears well-developed and well-nourished. She is cooperative. She does not have a sickly appearance. No distress. HENT:   Nose: Nose normal.   Mouth/Throat: Uvula is midline, oropharynx is clear and moist and mucous membranes are normal.   Hearing intact to nml conversation   Eyes: Pupils are equal, round, and reactive to light.  Conjunctivae, EOM and

## 2019-05-01 NOTE — PROGRESS NOTES
PATIENT REACHED   YES__X__NO____    PREOP INSTUCTIONS LEFT ON VM NUMBER_______________      DATE__5/13/19_______ TIME__1000_______ARRIVAL___0900_____PLACE___[facility]_________  NOTHING TO EAT OR DRINK  6 HOURS PRIOR TO PROCEDURE START TIME  YOU NEED A RESPONSIBLE ADULT AGE 18 OR OLDER TO DRIVE YOU HOME  PLEASE BRING INSURANCE CARD. PICTURE ID AND COMPLETE LIST OF MEDS  WEAR LOOSE COMFORTABLE CLOTHING  FOLLOW ANY INSTRUCTIONS YOUR [facility] OFFICE HAS GIVEN YOU,INCLUDING WHAT MEDICATIONS TO TAKE THE AM OF PROCEDURE AND WHEN AND IF YOU NEED TO STOP ANY BLOOD THINNERS. IF YOU HAVE QUESTIONS REGARDING THIS CALL THE OFFICE  THE GOAL BLOOD SUGAR THE AM OF PROCEDURE  OR LESS ABOVE THAT THE PROCEDURE MAY BE CANCELLED  ANY QUESTIONS CALL YOUR DOCTOR. ALSO,PLEASE READ THE INSTRUCTION PACKET FROM YOUR DR IF YOU RECEIVED ONE.   SPINE INTERVENTION NUMBER [phone]

## 2019-05-06 ENCOUNTER — TELEPHONE (OUTPATIENT)
Dept: ORTHOPEDIC SURGERY | Age: 39
End: 2019-05-06

## 2019-05-07 ENCOUNTER — TELEPHONE (OUTPATIENT)
Dept: ORTHOPEDIC SURGERY | Age: 39
End: 2019-05-07

## 2019-05-07 NOTE — TELEPHONE ENCOUNTER
Have her keep the follow up visit for after the injection. The injection coming up on 5/13 may help with this pain. At the follow up visit, we can discuss the pain further. Her insurance will not approve imaging of the tailbone without her being seen in the office first.  If she would like to come in earlier before the injection she can be placed on my schedule. However the injection may help with her pain.

## 2019-05-07 NOTE — TELEPHONE ENCOUNTER
Patient was last seen in the office on 4/12/2019. She calls today to request to order xray and or MRI of tailbone. She states her pain is not any better since last office visit. Please advise. Thanks. She is scheduled for L5, S1 TF GLENN on 5/13/2019    Plan of care from 4/12         1. Medications:  Continue anti-inflammatories with appropriate GI Precautions including to stop if develop dark tarry stools or GI upset and to take with food. 2. PT:  Encouraged to continue with HEP. 3. Further studies:  No further studies. 4. Interventional:  We discussed pursuing a Left L5 and S1 TF epidural steroid injection to address the pain.

## 2019-05-10 ENCOUNTER — OFFICE VISIT (OUTPATIENT)
Dept: ORTHOPEDIC SURGERY | Age: 39
End: 2019-05-10
Payer: COMMERCIAL

## 2019-05-10 VITALS
WEIGHT: 235 LBS | RESPIRATION RATE: 12 BRPM | SYSTOLIC BLOOD PRESSURE: 145 MMHG | DIASTOLIC BLOOD PRESSURE: 95 MMHG | HEART RATE: 78 BPM | HEIGHT: 69 IN | BODY MASS INDEX: 34.8 KG/M2

## 2019-05-10 DIAGNOSIS — M51.26 HNP (HERNIATED NUCLEUS PULPOSUS), LUMBAR: Primary | ICD-10-CM

## 2019-05-10 DIAGNOSIS — M47.816 SPONDYLOSIS WITHOUT MYELOPATHY OR RADICULOPATHY, LUMBAR REGION: ICD-10-CM

## 2019-05-10 DIAGNOSIS — M54.16 LUMBAR RADICULOPATHY: ICD-10-CM

## 2019-05-10 PROCEDURE — G8427 DOCREV CUR MEDS BY ELIG CLIN: HCPCS | Performed by: PHYSICIAN ASSISTANT

## 2019-05-10 PROCEDURE — 1036F TOBACCO NON-USER: CPT | Performed by: PHYSICIAN ASSISTANT

## 2019-05-10 PROCEDURE — 99213 OFFICE O/P EST LOW 20 MIN: CPT | Performed by: PHYSICIAN ASSISTANT

## 2019-05-10 PROCEDURE — G8417 CALC BMI ABV UP PARAM F/U: HCPCS | Performed by: PHYSICIAN ASSISTANT

## 2019-05-10 NOTE — PROGRESS NOTES
Follow up: Marino Major  1980  [id number]      CHIEF COMPLAINT:    Chief Complaint   Patient presents with    Lower Back Pain    Tailbone Pain         HISTORY OF PRESENT ILLNESS:  Ms. Nemo Davies is a 43 y.o. female returns for a follow up visit for multiple medical problems. Her current presenting problems are   1. HNP (herniated nucleus pulposus), lumbar    2. Lumbar radiculopathy    3. Spondylosis without myelopathy or radiculopathy, lumbar region    . As per information/history obtained from the PADT(patient assessment and documentation tool) - She complains of pain in the lower back with radiation to the hips Bilateral, upper leg Bilateral and feet Left She rates the pain 8/10 and describes it as sharp, aching, burning. Pain is made worse by: walking, sitting. She denies side effects from the current pain regimen. Patient reports that since the last follow up visit the physical functioning is worse, family/social relationships are unchanged, mood is unchanged and sleep patterns are unchanged, and that the overall functioning is unchanged. Patient denies neurological bowel or bladder. Patient in office for lower back and bilateral hip pain with increased pain down the left leg. Patient states that she feels a grinding and popping sensation in her back and hips. She describes her pain as a sharp, aching pain. She states that she has pain radiating from her back to her hips bilaterally, legs and into her left foot. Patient states that this recent discomfort started about 3 months ago, but has had back pain in general for the past 4 years. She has been having trouble walking and had to be off of work for two days. She did have relief following the most recent injection. She is about 30-40% improved. She has had one set of injections on 4/1/19 and she had a left L5, S1 GLENN completed. She is scheduled for 5/13/19 for the same injection to try to have maximum relief of her pain. Associated signs and symptoms:   Neurogenic bowel or bladder symptoms:  no   Perceived weakness:  no   Difficulty walking:  yes              Past Medical History:   Past Medical History:   Diagnosis Date    Anemia     Arthritis, lumbar spine 2018    Back pain     Cervical cancer screening ;    Nml per pt':under care of Dr. Eliza Luevano hypertension 2015    Gestational hypertension     Midline low back pain 2016    Non morbid obesity 2015    Prediabetes 2019      Past Surgical History:     Past Surgical History:   Procedure Laterality Date     SECTION      CHOLECYSTECTOMY      DILATION AND CURETTAGE OF UTERUS  16    FOOT SURGERY Left     Year     HYSTERECTOMY      LUMBAR SPINE SURGERY Left 2019    LEFT L5, S1 TRANSFORAMINAL EPIDURAL STEROID INJECTIONS WITH FLUOROSCOPY performed by Maryanne Abreu MD at [facility] FLX DX W/JUAN Keith 1978 PFRMD N/A 2018    COLONOSCOPY performed by Eliza Headley MD at [facility]       Current Medications:     Current Outpatient Medications:     vitamin D (CHOLECALCIFEROL) 1000 UNIT TABS tablet, Take 1,000 Units by mouth daily, Disp: , Rfl:     tiZANidine (ZANAFLEX) 4 MG tablet, Take 1 tablet by mouth 2 times daily, Disp: 60 tablet, Rfl: 0    hydrochlorothiazide (HYDRODIURIL) 25 MG tablet, TAKE 1 TABLET BY MOUTH DAILY, Disp: 30 tablet, Rfl: 2    ibuprofen (ADVIL;MOTRIN) 600 MG tablet, Take 1 tablet by mouth every 6 hours as needed for Pain, Disp: 30 tablet, Rfl: 0    gabapentin (NEURONTIN) 300 MG capsule, Take 1 capsule by mouth nightly for 30 days. Intended supply: 90 days, Disp: 30 capsule, Rfl: 0  Allergies:  Latex; Amoxicillin; Hydrocodone; Imidazole antifungals; Lisinopril; Norvasc [amlodipine besylate]; and Vicodin [hydrocodone-acetaminophen]  Social History:    reports that she has never smoked.  She has never used smokeless tobacco. She abnormal movements are noted. · LEFT UPPER EXTREMITY: Inspection/examination of the left upper extremity does not show any tenderness, deformity or injury. Range of motion is unremarkable and pain-free. There is no gross instability. There are no rashes, ulcerations or lesions. Strength and tone are normal. No atrophy or abnormal movements are noted. LUMBAR/SACRAL EXAMINATION:  · Inspection: Local inspection shows no step-off or bruising. Lumbar alignment is normal. No instability is noted. · Palpation:   No evidence of tenderness at the midline. Lumbar paraspinal tenderness: Mild L4/5 and L5/S1 tenderness  Bursal tenderness No tenderness bilaterally  There is no paraspinal spasm. · Range of Motion: limited by 50% in all planes due to pain  · Strength:   Strength testing is 5/5 in all muscle groups tested. · Special Tests:   Straight leg raise and crossed SLR negative. Yung's testing is negative bilaterally. FADIR's testing is negative bilaterally. · Skin: There are no rashes, ulcerations or lesions. · Reflexes: Reflexes are symmetrically 2+ at the patellar and ankle tendons. Clonus absent bilaterally at the feet. · Gait & station: normal, patient ambulates without assistance  · Additional Examinations:  · RIGHT LOWER EXTREMITY: Inspection/examination of the right lower extremity does not show any tenderness, deformity or injury. Range of motion is normal and pain-free. There is no gross instability. There are no rashes, ulcerations or lesions. Strength and tone are normal. No atrophy or abnormal movements are noted. · LEFT LOWER EXTREMITY:  Inspection/examination of the left lower extremity does not show any tenderness, deformity or injury. Range of motion is normal and pain-free. There is no gross instability. There are no rashes, ulcerations or lesions. Strength and tone are normal. No atrophy or abnormal movements are noted.       Diagnostic Testing:    MR Lumbar spine shows from 2/15/19:  No significant spinal canal stenosis.  No evidence of descending S1 nerve   root compression.  There is a small noncompressive disc extrusion at L5-S1. No acute fracture. Results for orders placed or performed in visit on 19   CBC   Result Value Ref Range    WBC 8.0 4.0 - 11.0 K/uL    RBC 4.46 4.00 - 5.20 M/uL    Hemoglobin 11.9 (L) 12.0 - 16.0 g/dL    Hematocrit 36.4 36.0 - 48.0 %    MCV 81.6 80.0 - 100.0 fL    MCH 26.6 26.0 - 34.0 pg    MCHC 32.6 31.0 - 36.0 g/dL    RDW 15.0 12.4 - 15.4 %    Platelets 040 412 - 209 K/uL    MPV 10.4 5.0 - 10.5 fL    PLATELET SLIDE REVIEW Adequate    Ferritin   Result Value Ref Range    Ferritin 48.4 15.0 - 150.0 ng/mL   IRON AND TIBC   Result Value Ref Range    Iron 73 37 - 145 ug/dL    TIBC 297 260 - 445 ug/dL    Iron Saturation 25 15 - 50 %   Hemoglobin A1C   Result Value Ref Range    Hemoglobin A1C 5.8 See comment %    eAG 119.8 mg/dL     Impression:       1. HNP (herniated nucleus pulposus), lumbar    2. Lumbar radiculopathy    3. Spondylosis without myelopathy or radiculopathy, lumbar region        Plan:  Clinical Course: shows no change    I discussed the diagnosis and the treatment options with Mary Tellez today. In Summary:  The various treatment options were outlined and discussed with Mary Tellez including:  Conservative care options: physical therapy, ice, medications, bracing, and activity modification. The indications for therapeutic injections. The indications for additional imaging/laboratory studies. The indications for (possible future) interventions. After considering the various options discussed, Mary Lemony elected to pursue a course of treatment that includes the followin. Medications:  No further recommendations for new medications. 2. PT:  Encouraged to continue with HEP. 3. Further studies:  No further studies.     4. Interventional:  We discussed pursuing a Left L5 and S1 TF  epidural steroid injection to address the

## 2019-05-13 ENCOUNTER — APPOINTMENT (OUTPATIENT)
Dept: GENERAL RADIOLOGY | Age: 39
End: 2019-05-13
Attending: PHYSICAL MEDICINE & REHABILITATION
Payer: COMMERCIAL

## 2019-05-13 ENCOUNTER — HOSPITAL ENCOUNTER (OUTPATIENT)
Age: 39
Setting detail: OUTPATIENT SURGERY
Discharge: HOME | End: 2019-05-13
Attending: PHYSICAL MEDICINE & REHABILITATION | Admitting: PHYSICAL MEDICINE & REHABILITATION
Payer: COMMERCIAL

## 2019-05-13 VITALS
HEIGHT: 69 IN | TEMPERATURE: 98.4 F | OXYGEN SATURATION: 100 % | SYSTOLIC BLOOD PRESSURE: 137 MMHG | RESPIRATION RATE: 16 BRPM | WEIGHT: 235 LBS | BODY MASS INDEX: 34.8 KG/M2 | DIASTOLIC BLOOD PRESSURE: 87 MMHG | HEART RATE: 88 BPM

## 2019-05-13 PROCEDURE — 6360000002 HC RX W HCPCS: Performed by: PHYSICAL MEDICINE & REHABILITATION

## 2019-05-13 PROCEDURE — 2500000003 HC RX 250 WO HCPCS: Performed by: PHYSICAL MEDICINE & REHABILITATION

## 2019-05-13 PROCEDURE — 3610000056 HC PAIN LEVEL 4 BASE (NON-OR): Performed by: PHYSICAL MEDICINE & REHABILITATION

## 2019-05-13 PROCEDURE — 2709999900 HC NON-CHARGEABLE SUPPLY: Performed by: PHYSICAL MEDICINE & REHABILITATION

## 2019-05-13 PROCEDURE — 3209999900 FLUORO FOR SURGICAL PROCEDURES

## 2019-05-13 PROCEDURE — 99152 MOD SED SAME PHYS/QHP 5/>YRS: CPT | Performed by: PHYSICAL MEDICINE & REHABILITATION

## 2019-05-13 RX ORDER — BUPIVACAINE HYDROCHLORIDE 5 MG/ML
INJECTION, SOLUTION PERINEURAL
Status: COMPLETED | OUTPATIENT
Start: 2019-05-13 | End: 2019-05-13

## 2019-05-13 RX ORDER — MIDAZOLAM HYDROCHLORIDE 1 MG/ML
INJECTION INTRAMUSCULAR; INTRAVENOUS
Status: COMPLETED | OUTPATIENT
Start: 2019-05-13 | End: 2019-05-13

## 2019-05-13 RX ORDER — METHYLPREDNISOLONE ACETATE 80 MG/ML
INJECTION, SUSPENSION INTRA-ARTICULAR; INTRALESIONAL; INTRAMUSCULAR; SOFT TISSUE
Status: COMPLETED | OUTPATIENT
Start: 2019-05-13 | End: 2019-05-13

## 2019-05-13 RX ORDER — FENTANYL CITRATE 50 UG/ML
INJECTION, SOLUTION INTRAMUSCULAR; INTRAVENOUS
Status: COMPLETED | OUTPATIENT
Start: 2019-05-13 | End: 2019-05-13

## 2019-05-13 RX ORDER — LIDOCAINE HYDROCHLORIDE 10 MG/ML
INJECTION, SOLUTION INFILTRATION; PERINEURAL
Status: COMPLETED | OUTPATIENT
Start: 2019-05-13 | End: 2019-05-13

## 2019-05-13 ASSESSMENT — PAIN - FUNCTIONAL ASSESSMENT
PAIN_FUNCTIONAL_ASSESSMENT: 0-10
PAIN_FUNCTIONAL_ASSESSMENT: 0-10

## 2019-05-13 ASSESSMENT — PAIN DESCRIPTION - ORIENTATION: ORIENTATION: LOWER

## 2019-05-13 ASSESSMENT — PAIN DESCRIPTION - LOCATION: LOCATION: BACK

## 2019-05-13 ASSESSMENT — PAIN DESCRIPTION - PAIN TYPE: TYPE: CHRONIC PAIN

## 2019-05-13 ASSESSMENT — PAIN SCALES - GENERAL: PAINLEVEL_OUTOF10: 3

## 2019-05-13 ASSESSMENT — PAIN DESCRIPTION - DESCRIPTORS: DESCRIPTORS: ACHING;SHARP

## 2019-05-13 NOTE — OP NOTE
Patient:  Kristen Carson  YOB: 1980  Medical Record #:  [id number]   Place: [address] [place] [address]  Date:  5/13/2019   Physician:  Edmar Hernandez MD, BRADEN    Procedure: 1. Transforaminal Lumbar Epidural Steroid Injection -  left L5           2. Transforaminal Lumbar Epidural Steroid Injection -  left S1     Pre-Procedure Diagnosis: Lumbar radiculopathy      Post-Procedure Diagnosis: Same    Sedation: Local with 1% Lidocaine 3 ml and 2 mg of IV Versed    EBL: None    Complications: None    Procedure Summary:        The patient was brought to the procedure suite and placed in the prone position. The skin overlying the lumbar spine was prepped and draped in the usual sterile fashion. Using fluoroscopic guidance, the left L5 foramen was identified. Through anesthetized skin, a 22 gauge 5 inch curved tip spinal needle was advanced into the foramen. Isovue M 300 was instilled showing an epidurogram/nerve root outline pattern without evidence of vascular or intrathecal spread. Following which, 50 mg of depomedrol mixed with 1 ml of 0.5% Marcaine was instilled. The needle was removed. Using fluoroscopic guidance, the left S1 foramen was identified. Through anesthetized skin, a 22 gauge 5 inch curved tip spinal needle was advanced into the foramen. Isovue M 300 was instilled showing an epidurogram/nerve root outline pattern without evidence of vascular or intrathecal spread. Following which, 50 mg of depomedrol mixed with 1 ml of 0.5% Marcaine was instilled. The needle was removed and band-aids were applied. The patient was transferred to the post-operative area in stable condition.

## 2019-05-13 NOTE — H&P
HISTORY AND PHYSICAL/PRE-SEDATION ASSESSMENT    Patient:  Kp Escalera   :  1980  Medical Record No.:  [id number]   Date:  2019  Physician:  Rox Villegas M.D. Facility: [address] [place] [address]    HISTORY OF PRESENT ILLNESS:                 The patient is a 43 y.o. female whom presents with lower back and bilateral leg pain. Review of the imaging and physical exam of the patient confirmed the pre-procedure diagnosis. After a thorough discussion of risks, benefits and alternatives informed consent was obtained. Past Medical History:   Past Medical History:   Diagnosis Date    Anemia     Arthritis, lumbar spine 2018    Back pain     Cervical cancer screening ;    Nml per pt':under care of Dr. Caity Goodman hypertension 2015    Gestational hypertension     Midline low back pain 2016    Non morbid obesity 2015    Prediabetes 2019      Past Surgical History:     Past Surgical History:   Procedure Laterality Date     SECTION      CHOLECYSTECTOMY      DILATION AND CURETTAGE OF UTERUS  16    FOOT SURGERY Left     Year     HYSTERECTOMY      LUMBAR SPINE SURGERY Left 2019    LEFT L5, S1 TRANSFORAMINAL EPIDURAL STEROID INJECTIONS WITH FLUOROSCOPY performed by Rox Villegas MD at [facility] FLX DX W/COLLMAAME Keith 1979 PFRMD N/A 2018    COLONOSCOPY performed by Abdias Castillo MD at [address] [place] [address]       Current Medications:   Prior to Admission medications    Medication Sig Start Date End Date Taking?  Authorizing Provider   vitamin D (CHOLECALCIFEROL) 1000 UNIT TABS tablet Take 1,000 Units by mouth daily   Yes Historical Provider, MD   tiZANidine (ZANAFLEX) 4 MG tablet Take 1 tablet by mouth 2 times daily 19  Yes Rox Villegas MD   hydrochlorothiazide (HYDRODIURIL) 25 MG tablet TAKE 1 TABLET BY MOUTH DAILY 19  Yes Roberto Harris MD   ibuprofen (ADVIL;MOTRIN) 600 MG tablet Take 1 tablet by mouth every 6 hours as needed for Pain 12/31/18  Yes Radha Mi MD   gabapentin (NEURONTIN) 300 MG capsule Take 1 capsule by mouth nightly for 30 days. Intended supply: 90 days 4/5/19 5/5/19  Beto Delvalle MD     Allergies:  Latex; Amoxicillin; Hydrocodone; Imidazole antifungals; Lisinopril; Norvasc [amlodipine besylate]; and Vicodin [hydrocodone-acetaminophen]  Social History:    reports that she has never smoked. She has never used smokeless tobacco. She reports that she drinks about 0.6 oz of alcohol per week. She reports that she does not use drugs. Family History:   Family History   Problem Relation Age of Onset    High Blood Pressure Mother     Diabetes Mother     Other Father     Rheum Arthritis Neg Hx     Osteoarthritis Neg Hx     Asthma Neg Hx     Cancer Neg Hx     High Cholesterol Neg Hx     Hypertension Neg Hx     Migraines Neg Hx     Rashes/Skin Problems Neg Hx     Seizures Neg Hx     Stroke Neg Hx     Thyroid Disease Neg Hx        Vitals: Blood pressure 130/72, pulse 81, temperature 98.4 °F (36.9 °C), temperature source Temporal, resp. rate 16, height 5' 9\" (1.753 m), weight 235 lb (106.6 kg), last menstrual period 03/20/2018, SpO2 100 %, unknown if currently breastfeeding. PHYSICAL EXAM:including affected areas  HENT: Airway patent and reviewed  Cardiovascular: Normal rate, regular rhythm, normal heart sounds. Pulmonary/Chest: No wheezes. No rhonchi. No rales. Abdominal: Soft. Bowel sounds are normal. No distension. Extremities: Moves all extremities equally  Cervical and Lumbar Spine: Painful range of motion, no midline tenderness       Diagnosis:Lumbar radiculopathy  M51.26, M54.16    Plan: Proceed with planned procedure      ASA CLASS:         []   I. Normal, healthy adult           [x]   II.  Mild systemic disease            []   III.   Severe systemic disease      Mallampati: Mallampati Class II - (soft palate, fauces & uvula are visible)      Sedation plan:   [x]  Local              []  Minimal                  []  General anesthesia    Patient's condition acceptable for planned procedure/sedation. Post Procedure Plan   Return to same level of care   ______________________     The risks and benefits as well as alternatives to the procedure have been discussed with the patient and or family. The patient and or next of kin understands and agrees to proceed.     Nickolas Browne M.D.

## 2019-05-23 ENCOUNTER — TELEPHONE (OUTPATIENT)
Dept: ORTHOPEDIC SURGERY | Age: 39
End: 2019-05-23

## 2019-05-23 NOTE — TELEPHONE ENCOUNTER
Spoke with patient. She was offered multiple appointments with Dr. Ranjana Koroma and Murray Jj. She refused them all. She refuses to see the PA. She refused all other appt offered w/[facility]. She states she will call back to r/s at another time.     She cancelled her appt for 5/24

## 2019-05-23 NOTE — TELEPHONE ENCOUNTER
PT WANTS TO SEE IF YOU CAN SQUEEZE HER IN FOR A LATER APPT TOMORROW B/C SHE IS GOING OUT OF TOWN SOON AND NEEDS TO FUA INJ THAT WAS ON 5/13/19 BUT DO NOT WANT TO SEE Alecia Prescott. TRIED TO SCHEDULE WITH SHAYNA BUT SHE WANTS TO SEE DOCTOR Farrah Sanchez.

## 2019-05-24 ENCOUNTER — OFFICE VISIT (OUTPATIENT)
Dept: ORTHOPEDIC SURGERY | Age: 39
End: 2019-05-24
Payer: COMMERCIAL

## 2019-05-24 DIAGNOSIS — E16.2 HYPOGLYCEMIA: ICD-10-CM

## 2019-05-24 DIAGNOSIS — M54.16 LUMBAR RADICULOPATHY: ICD-10-CM

## 2019-05-24 DIAGNOSIS — M47.816 SPONDYLOSIS WITHOUT MYELOPATHY OR RADICULOPATHY, LUMBAR REGION: ICD-10-CM

## 2019-05-24 DIAGNOSIS — M51.26 HNP (HERNIATED NUCLEUS PULPOSUS), LUMBAR: Primary | ICD-10-CM

## 2019-05-24 DIAGNOSIS — E16.2 HYPOGLYCEMIA: Primary | ICD-10-CM

## 2019-05-24 PROCEDURE — G8417 CALC BMI ABV UP PARAM F/U: HCPCS | Performed by: PHYSICIAN ASSISTANT

## 2019-05-24 PROCEDURE — 1036F TOBACCO NON-USER: CPT | Performed by: PHYSICIAN ASSISTANT

## 2019-05-24 PROCEDURE — G8427 DOCREV CUR MEDS BY ELIG CLIN: HCPCS | Performed by: PHYSICIAN ASSISTANT

## 2019-05-24 PROCEDURE — 99213 OFFICE O/P EST LOW 20 MIN: CPT | Performed by: PHYSICIAN ASSISTANT

## 2019-05-24 NOTE — PROGRESS NOTES
Follow up: Shasha Day  1980  [id number]         Chief Complaint   Patient presents with    Follow-up     LSP         HISTORY OF PRESENT ILLNESS:  Ms. Harmon is a 43 y.o. female returns for a follow up visit for multiple medical problems. Her current presenting problems are   1. HNP (herniated nucleus pulposus), lumbar    2. Lumbar radiculopathy    3. Spondylosis without myelopathy or radiculopathy, lumbar region      As per information/history obtained from the PADT(patient assessment and documentation tool) - She complains of pain in the lower back, hips Bilateral, upper leg Left, knees Left and lower leg Left with radiation to the lower back, buttocks, hips Bilateral, upper leg Left, knees Left, lower leg Left, ankles Left and feet Left She rates the pain 5/10 and describes it as sharp, shooting, cracking, stabbing. Pain is made worse by: walking, standing, sitting. She denies side effects from the current pain regimen. Patient reports that since the last follow up visit the physical functioning is unchanged, family/social relationships are worse, mood is unchanged and sleep patterns are unchanged, and that the overall functioning is unchanged. Patient denies neurological bowel or bladder. Patient is present today for a follow up of her LSP. She states her pain is in her Lower back, both hips, and left leg. She rates her pain today a 5/10 and describes it as a shooting, cracking, stabbing pain. The patient had a left L5-S1 GLENN on 5/13/19, and she has had about 50% relief from this injection. There are some days where she does not have pain at all. When she sits sometimes, she says it feels like she is sitting on rocks and can not sit correctly or long with out it hurting. The patient says she has not fallen or done anything that she can think of for the pain to be this bad. The patient states that she has a lot going on with her health.   She is seeing a rheumatologist and a endocrinologist for possible Lupus and hypoglycemic unawareness. The patient reports today no new injuries or issues at this time.      Associated signs and symptoms:   Neurogenic bowel or bladder symptoms:  yes   Perceived weakness:  yes   Difficulty walking:  yes              Past Medical History:   Past Medical History:   Diagnosis Date    Anemia     Arthritis, lumbar spine 2018    Back pain     Cervical cancer screening ;    Nml per pt':under care of Dr. Nate Otoole hypertension 2015    Gestational hypertension     Midline low back pain 2016    Non morbid obesity 2015    Prediabetes 2019      Past Surgical History:     Past Surgical History:   Procedure Laterality Date     SECTION      CHOLECYSTECTOMY      DILATION AND CURETTAGE OF UTERUS  16    FOOT SURGERY Left     Year     HYSTERECTOMY      LUMBAR SPINE SURGERY Left 2019    LEFT L5, S1 TRANSFORAMINAL EPIDURAL STEROID INJECTIONS WITH FLUOROSCOPY performed by Raj Del Castillo MD at [facility] Left 2019    LEFT L5, S1 TRANSFORAMINAL EPIDURAL STEROID INJECTION WITH FLUOROSCOPY performed by Raj Del Castillo MD at [address] FLX DX W/COLLMAAME Keith 1979 PFRMD N/A 2018    COLONOSCOPY performed by Zenon Babcock MD at [address]       Current Medications:     Current Outpatient Medications:     vitamin D (CHOLECALCIFEROL) 1000 UNIT TABS tablet, Take 1,000 Units by mouth daily, Disp: , Rfl:     hydrochlorothiazide (HYDRODIURIL) 25 MG tablet, TAKE 1 TABLET BY MOUTH DAILY, Disp: 30 tablet, Rfl: 2    hydroxychloroquine (PLAQUENIL) 200 MG tablet, TK 1 T PO BID, Disp: , Rfl: 4    glucose monitoring kit (FREESTYLE) monitoring kit, 1 kit by Does not apply route daily, Disp: 1 kit, Rfl: 0    blood glucose monitor strips, Test 3 times a day & as needed for symptoms of irregular blood glucose., Disp: 100 strip, Rfl: 1    Lancets MISC, 1 each by Does not apply route daily Check blood sugar 2-3 times per day, Disp: 100 each, Rfl: 1    gabapentin (NEURONTIN) 300 MG capsule, Take 1 capsule by mouth nightly for 30 days. Intended supply: 90 days, Disp: 30 capsule, Rfl: 0  Allergies:  Latex; Amoxicillin; Hydrocodone; Imidazole antifungals; Lisinopril; Norvasc [amlodipine besylate]; and Vicodin [hydrocodone-acetaminophen]  Social History:    reports that she has never smoked. She has never used smokeless tobacco. She reports that she drinks about 1.0 standard drinks of alcohol per week. She reports that she does not use drugs. Family History:   Family History   Problem Relation Age of Onset    High Blood Pressure Mother     Diabetes Mother     Other Father     Rheum Arthritis Neg Hx     Osteoarthritis Neg Hx     Asthma Neg Hx     Cancer Neg Hx     High Cholesterol Neg Hx     Hypertension Neg Hx     Migraines Neg Hx     Rashes/Skin Problems Neg Hx     Seizures Neg Hx     Stroke Neg Hx     Thyroid Disease Neg Hx        REVIEW OF SYSTEMS:   CONSTITUTIONAL: Denies unexplained weight loss, fevers, chills or fatigue  NEUROLOGICAL: Denies unsteady gait or progressive weakness  MUSCULOSKELETAL: Denies joint swelling or redness  GI: Denies nausea, vomiting, diarrhea   : Denies bowel or bladder issues       PHYSICAL EXAM:    Vitals: Pulse 78, resp. rate 16, height 5' 9.02\" (1.753 m), weight 235 lb 0.2 oz (106.6 kg), last menstrual period 03/20/2018, not currently breastfeeding. GENERAL EXAM:  · General Apparence: Patient is adequately groomed with no evidence of malnutrition. · Psychiatric: Orientation: The patient is oriented to time, place and person. The patient's mood and affect are appropriate   · Vascular: Examination reveals no swelling and palpation reveals no tenderness in upper or lower extremities. Good capillary refill.    · The lymphatic examination of the neck, axillae and groin reveals all areas to be without enlargement or or lesions. Strength and tone are normal. No atrophy or abnormal movements are noted. · LEFT LOWER EXTREMITY:  Inspection/examination of the left lower extremity does not show any tenderness, deformity or injury. Range of motion is normal and pain-free. There is no gross instability. There are no rashes, ulcerations or lesions. Strength and tone are normal. No atrophy or abnormal movements are noted. Diagnostic Testing:    MR Lumbar spine shows    FINDINGS:   BONES/ALIGNMENT: There is normal alignment of the spine. The vertebral body   heights are maintained. The bone marrow signal appears unremarkable.  Disc   desiccation at L5-S1.       SPINAL CORD: The conus terminates normally.       SOFT TISSUES: No paraspinal mass identified.       L1-L2: There is no significant disc herniation, spinal canal stenosis or   neural foraminal narrowing.       L2-L3: There is no significant disc herniation, spinal canal stenosis or   neural foraminal narrowing.       L3-L4: There is no significant disc herniation, spinal canal stenosis or   neural foraminal narrowing.       L4-L5: There is no significant disc herniation, spinal canal stenosis or   neural foraminal narrowing.       L5-S1: Mild facet hypertrophy.  Small central left/subarticular disc   extrusion with slight inferior migration.  Minimal neural foraminal   narrowing.  No significant spinal canal stenosis.  No evidence of descending   S1 nerve root displacement or compression.           Impression   No significant spinal canal stenosis.  No evidence of descending S1 nerve   root compression.  There is a small noncompressive disc extrusion at L5-S1. No acute fracture. Results for orders placed or performed in visit on 05/24/19   SPECIMEN REJECTION   Result Value Ref Range    Rejected Test [id number]     Reason for Rejection see below      Impression:       1. HNP (herniated nucleus pulposus), lumbar    2. Lumbar radiculopathy    3.  Spondylosis without myelopathy or radiculopathy, lumbar region        Plan:  Clinical Course: Above diagnoses are improving     I discussed the diagnosis and the treatment options with John Norman today. In Summary:  The various treatment options were outlined and discussed with John Norman including:  Conservative care options: physical therapy, ice, medications, bracing, and activity modification. The indications for therapeutic injections. The indications for additional imaging/laboratory studies. The indications for (possible future) interventions. After considering the various options discussed, John Norman elected to pursue a course of treatment that includes the followin. Medications:  No further recommendations for new medications. 2. PT:  Encouraged to continue with HEP. 3. Further studies:  No further studies. 4. Interventional:  I will call the patient at the end of July to possibly schedule a 3rd GLENN if she continues to have lower back and left leg pain. I have offered a referral to Dr. Briseida Carreon to have a conversation about possible surgical interventions. She is seeing multiple physicians for possible autoimmune diseases and would like to take care of these issues first.      5. Follow up:  As needed. John Norman was instructed to call the office if her symptoms worsen or if new symptoms appear prior to the next scheduled visit. She is specifically instructed to contact the office between now & her scheduled appointment if she has concerns related to her condition or if she needs assistance in scheduling the above tests. She is welcome to call for an appointment sooner if she has any additional concerns or questions. NICHOLE Bell am scribing for and in the presence of Mily Jacobson PA-C. The physical examination was performed between the patient and Mily Jacobson.   All counseling during the appointment was performed between the patient and Nancy Barber Shi Tapia PA-C      07/16/19 12:40 PM  Mandi Bess [facility]          SUNG Nguyen, MARION  Board Certified by the [employer] on Certification of Physician Assistants  Marcela Dominguez 57  Partner of [facility] ([facility])        This dictation was performed with a verbal recognition program [facility]) and it was checked for errors. It is possible that there are still dictated errors within this office note. If so, please bring any errors to my attention for an addendum. All efforts were made to ensure that this office note is accurate.

## 2019-06-04 ENCOUNTER — TELEPHONE (OUTPATIENT)
Dept: FAMILY MEDICINE CLINIC | Age: 39
End: 2019-06-04

## 2019-06-04 LAB
REASON FOR REJECTION: NORMAL
REJECTED TEST: NORMAL

## 2019-06-04 NOTE — TELEPHONE ENCOUNTER
[facility] called on behalf of patient for a Rejected specimen    Glucagon: Marcia 128: [phone]    Patient OV: 4/29/2019    Please advise.

## 2019-06-07 ENCOUNTER — OFFICE VISIT (OUTPATIENT)
Dept: ENDOCRINOLOGY | Age: 39
End: 2019-06-07
Payer: COMMERCIAL

## 2019-06-07 VITALS
HEIGHT: 69 IN | SYSTOLIC BLOOD PRESSURE: 116 MMHG | DIASTOLIC BLOOD PRESSURE: 73 MMHG | BODY MASS INDEX: 33.77 KG/M2 | OXYGEN SATURATION: 99 % | HEART RATE: 90 BPM | WEIGHT: 228 LBS

## 2019-06-07 DIAGNOSIS — R73.03 PREDIABETES: ICD-10-CM

## 2019-06-07 DIAGNOSIS — E16.2 HYPOGLYCEMIA: Primary | ICD-10-CM

## 2019-06-07 DIAGNOSIS — E16.2 HYPOGLYCEMIA: ICD-10-CM

## 2019-06-07 DIAGNOSIS — E66.1 CLASS 1 DRUG-INDUCED OBESITY WITHOUT SERIOUS COMORBIDITY WITH BODY MASS INDEX (BMI) OF 33.0 TO 33.9 IN ADULT: ICD-10-CM

## 2019-06-07 DIAGNOSIS — I10 ESSENTIAL HYPERTENSION: ICD-10-CM

## 2019-06-07 PROBLEM — E66.811 CLASS 1 DRUG-INDUCED OBESITY WITHOUT SERIOUS COMORBIDITY WITH BODY MASS INDEX (BMI) OF 33.0 TO 33.9 IN ADULT: Status: ACTIVE | Noted: 2019-06-07

## 2019-06-07 PROCEDURE — G8427 DOCREV CUR MEDS BY ELIG CLIN: HCPCS | Performed by: INTERNAL MEDICINE

## 2019-06-07 PROCEDURE — G8417 CALC BMI ABV UP PARAM F/U: HCPCS | Performed by: INTERNAL MEDICINE

## 2019-06-07 PROCEDURE — 99244 OFF/OP CNSLTJ NEW/EST MOD 40: CPT | Performed by: INTERNAL MEDICINE

## 2019-06-07 RX ORDER — HYDROCHLOROTHIAZIDE 25 MG/1
TABLET ORAL
Qty: 30 TABLET | Refills: 2 | Status: SHIPPED | OUTPATIENT
Start: 2019-06-07 | End: 2019-10-20 | Stop reason: SDUPTHER

## 2019-06-07 RX ORDER — BLOOD-GLUCOSE METER
1 KIT MISCELLANEOUS DAILY
Qty: 1 KIT | Refills: 0 | Status: SHIPPED | OUTPATIENT
Start: 2019-06-07 | End: 2020-06-22

## 2019-06-07 RX ORDER — GLUCOSAMINE HCL/CHONDROITIN SU 500-400 MG
CAPSULE ORAL
Qty: 100 STRIP | Refills: 1 | Status: SHIPPED | OUTPATIENT
Start: 2019-06-07 | End: 2020-06-22

## 2019-06-07 RX ORDER — LANCETS 30 GAUGE
1 EACH MISCELLANEOUS DAILY
Qty: 100 EACH | Refills: 1 | Status: SHIPPED | OUTPATIENT
Start: 2019-06-07 | End: 2020-06-22

## 2019-06-07 RX ORDER — HYDROXYCHLOROQUINE SULFATE 200 MG/1
TABLET, FILM COATED ORAL
Refills: 4 | COMMUNITY
Start: 2019-05-10 | End: 2020-01-30

## 2019-06-07 NOTE — PROGRESS NOTES
Patient ID:   Angelica Winkler is a 43 y.o. female    Chief Complaint:   Angelica Winkler is seen for initial evaluation of hypoglycemias   Referred by Dr. Jhony Major MD     Subjective:   Angelica Winkler having\"hypogycemic \" symptoms since fall 2018 and more since Jan 2019  Gestational diabetes in 2012. Prediabetic since then. Never took insulin in past.     7 mountain dews now cut down to one per day   Jitteriness after drinking morning coffee, also associated with excessive sweating, dizziness. Glucose at work was 69, drank two cups of orange juice and increased to 92. Two episodes at work. One episode while driving.    Lost some weight since May 2019, 7 lbs  Steroid back shots in April and May 2019     S/p hysterectomy   Denies history of gastric bypass surgery, pituitary pathology or head trauma  Denies medical problems related to Hepatic, renal or cardiac failure   Use of steroids in the past     Saw RD at work     The following portions of the patient's history were reviewed and updated as appropriate:      Family History   Problem Relation Age of Onset    High Blood Pressure Mother     Diabetes Mother     Other Father     Rheum Arthritis Neg Hx     Osteoarthritis Neg Hx     Asthma Neg Hx     Cancer Neg Hx     High Cholesterol Neg Hx     Hypertension Neg Hx     Migraines Neg Hx     Rashes/Skin Problems Neg Hx     Seizures Neg Hx     Stroke Neg Hx     Thyroid Disease Neg Hx          Social History     Socioeconomic History    Marital status: Single     Spouse name: Not on file    Number of children: Not on file    Years of education: Not on file    Highest education level: Not on file   Occupational History    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   Social Needs    Financial resource strain: Not on file    Food insecurity:     Worry: Not on file     Inability: Not on file    Transportation needs:     Medical: Not on file     Non-medical: Not on file   Tobacco Use    Smoking status: Never Smoker    Smokeless tobacco: Never Used   Substance and Sexual Activity    Alcohol use:  Yes     Alcohol/week: 0.6 oz     Types: 1 Glasses of wine per week     Comment: occasional    Drug use: No    Sexual activity: Yes     Partners: Male   Lifestyle    Physical activity:     Days per week: Not on file     Minutes per session: Not on file    Stress: Not on file   Relationships    Social connections:     Talks on phone: Not on file     Gets together: Not on file     Attends Restorationist service: Not on file     Active member of club or organization: Not on file     Attends meetings of clubs or organizations: Not on file     Relationship status: Not on file    Intimate partner violence:     Fear of current or ex partner: Not on file     Emotionally abused: Not on file     Physically abused: Not on file     Forced sexual activity: Not on file   Other Topics Concern    Not on file   Social History Narrative    Not on file         Past Medical History:   Diagnosis Date    Anemia     Arthritis, lumbar spine 2018    Back pain     Cervical cancer screening ;    Nml per pt':under care of Dr. Alejandra Hoskins hypertension 2015    Gestational hypertension     Midline low back pain 2016    Non morbid obesity 2015    Prediabetes 2019         Past Surgical History:   Procedure Laterality Date     SECTION      CHOLECYSTECTOMY      DILATION AND CURETTAGE OF UTERUS  16    FOOT SURGERY Left     Year     HYSTERECTOMY      LUMBAR SPINE SURGERY Left 2019    LEFT L5, S1 TRANSFORAMINAL EPIDURAL STEROID INJECTIONS WITH FLUOROSCOPY performed by Nickolas Browne MD at [address] [place] [address] Left 2019    LEFT L5, S1 TRANSFORAMINAL EPIDURAL STEROID INJECTION WITH FLUOROSCOPY performed by Nickolas Browne MD at [address] [facility] FLX DX W/JUAN Keith 1979 PFRMD N/A 2018    COLONOSCOPY performed by Amarilis Peralta MD at [facility] ENDOSCOPY    TONSILLECTOMY           Allergies   Allergen Reactions    Latex Itching and Rash     Condoms only; gloves seem to be ok  purities  purities    Amoxicillin Itching     Patient has received PCN IV without reported issues    Hydrocodone Hives    Imidazole Antifungals Other (See Comments)    Lisinopril Other (See Comments)     diarrhea    Norvasc [Amlodipine Besylate] Other (See Comments)     GI upset    Vicodin [Hydrocodone-Acetaminophen] Itching and Nausea Only         Current Outpatient Medications:     hydrochlorothiazide (HYDRODIURIL) 25 MG tablet, TAKE 1 TABLET BY MOUTH DAILY, Disp: 30 tablet, Rfl: 2    hydroxychloroquine (PLAQUENIL) 200 MG tablet, TK 1 T PO BID, Disp: , Rfl: 4    glucose monitoring kit (FREESTYLE) monitoring kit, 1 kit by Does not apply route daily, Disp: 1 kit, Rfl: 0    blood glucose monitor strips, Test 3 times a day & as needed for symptoms of irregular blood glucose., Disp: 100 strip, Rfl: 1    Lancets MISC, 1 each by Does not apply route daily Check blood sugar 2-3 times per day, Disp: 100 each, Rfl: 1    vitamin D (CHOLECALCIFEROL) 1000 UNIT TABS tablet, Take 1,000 Units by mouth daily, Disp: , Rfl:     gabapentin (NEURONTIN) 300 MG capsule, Take 1 capsule by mouth nightly for 30 days. Intended supply: 90 days, Disp: 30 capsule, Rfl: 0    Review of Systems:    Constitutional: Negative for fever, chills, and unexpected weight change. HENT: Negative for congestion, ear pain, rhinorrhea,  sore throat and trouble swallowing. Eyes: Negative for photophobia, redness, itching. Respiratory: Negative for cough, shortness of breath and sputum. Cardiovascular: Negative for chest pain, palpitations and leg swelling. Gastrointestinal: Negative for nausea, vomiting, abdominal pain, diarrhea, constipation. Endocrine: Negative for cold intolerance, heat intolerance, polydipsia, polyphagia and polyuria.    Genitourinary: Negative for dysuria, urgency, frequency, hematuria and flank pain. Musculoskeletal: She has back pain and fibromyalgia. Negative for myalgias, arthralgias and neck pain. Skin/Nail: Negative for rash, itching. Normal nails. Neurological: Negative for seizures, weakness, light-headedness, numbness and headaches. Hematological/ Lymph nodes: Negative for adenopathy. Does not bruise/bleed easily. Psychiatric/Behavioral: Negative for suicidal ideas, depression, anxiety, sleep disturbance and decreased concentration. Objective:   Physical Exam:    /73 (Site: Left Upper Arm, Position: Sitting, Cuff Size: Large Adult)   Pulse 90   Ht 5' 9\" (1.753 m)   Wt 228 lb (103.4 kg)   LMP 03/20/2018   SpO2 99%   Breastfeeding? No   BMI 33.67 kg/m²     Constitutional: Patient is oriented to person, place, and time. Patient appears well-developed and well-nourished. HENT:    Head: Normocephalic and atraumatic. Eyes: Conjunctivae and EOM are normal.      Neck: Normal range of motion. Thyroid normal size. Cardiovascular: Normal rate, regular rhythm and normal heart sounds. Pulmonary/Chest: Effort normal and breath sounds normal.   Abdominal: Soft. Bowel sounds are normal.   Musculoskeletal: Normal range of motion. Neurological: Patient is alert and oriented to person, place, and time. Patient has normal reflexes. Skin: Skin is warm and dry. Psychiatric: Patient has a normal mood and affect.  Patient behavior is normal.     Lab Review:      Orders Only on 05/24/2019   Component Date Value Ref Range Status    Rejected Test 06/04/2019 [id number]   Final    Reason for Rejection 06/04/2019 see below   Final   Orders Only on 05/18/2019   Component Date Value Ref Range Status    Glucose 05/18/2019 92  70 - 99 mg/dL Final    Hemoglobin A1C 05/18/2019 5.6  See comment % Final    eAG 05/18/2019 114.0  mg/dL Final    WBC 05/18/2019 4.1  4.0 - 11.0 K/uL Final    RBC 05/18/2019 3.68* 4.00 - 5.20 M/uL Final    Hemoglobin 05/18/2019 11.1* Status    Rapid Influenza A Ag 12/27/2018 Negative  Negative Final    Rapid Influenza B Ag 12/27/2018 Negative  Negative Final   Orders Only on 10/26/2018   Component Date Value Ref Range Status    Sodium 10/26/2018 143  136 - 145 mmol/L Final    Potassium 10/26/2018 4.0  3.5 - 5.1 mmol/L Final    Chloride 10/26/2018 102  99 - 110 mmol/L Final    CO2 10/26/2018 27  21 - 32 mmol/L Final    Anion Gap 10/26/2018 14  3 - 16 Final    Glucose 10/26/2018 75  70 - 99 mg/dL Final    BUN 10/26/2018 13  7 - 20 mg/dL Final    CREATININE 10/26/2018 0.7  0.6 - 1.1 mg/dL Final    GFR Non- 10/26/2018 >60  >60 Final    GFR  10/26/2018 >60  >60 Final    Calcium 10/26/2018 9.6  8.3 - 10.6 mg/dL Final    Total Protein 10/26/2018 7.2  6.4 - 8.2 g/dL Final    Alb 10/26/2018 4.5  3.4 - 5.0 g/dL Final    Albumin/Globulin Ratio 10/26/2018 1.7  1.1 - 2.2 Final    Total Bilirubin 10/26/2018 <0.2  0.0 - 1.0 mg/dL Final    Alkaline Phosphatase 10/26/2018 72  40 - 129 U/L Final    ALT 10/26/2018 13  10 - 40 U/L Final    AST 10/26/2018 17  15 - 37 U/L Final    Globulin 10/26/2018 2.7  g/dL Final   Office Visit on 10/15/2018   Component Date Value Ref Range Status    WBC 10/26/2018 7.1  4.0 - 11.0 K/uL Final    RBC 10/26/2018 4.54  4.00 - 5.20 M/uL Final    Hemoglobin 10/26/2018 11.8* 12.0 - 16.0 g/dL Final    Hematocrit 10/26/2018 36.2  36.0 - 48.0 % Final    MCV 10/26/2018 79.7* 80.0 - 100.0 fL Final    MCH 10/26/2018 26.0  26.0 - 34.0 pg Final    MCHC 10/26/2018 32.7  31.0 - 36.0 g/dL Final    RDW 10/26/2018 15.1  12.4 - 15.4 % Final    Platelets 10/80/7632 168  135 - 450 K/uL Final    MPV 10/26/2018 10.3  5.0 - 10.5 fL Final    PLATELET SLIDE REVIEW 10/26/2018 Clumped   Final    SLIDE REVIEW 10/26/2018 see below   Final           Assessment and Plan       Ulises Preciado was seen today for consultation.     Diagnoses and all orders for this visit:    Hypoglycemia  -     glucose monitoring with complex carbs     RTC in 3 weeks with logs/meter     Electronically signed by Deedee Mariscal MD on 6/7/2019 at 11:41 AM

## 2019-06-07 NOTE — PATIENT INSTRUCTIONS
know what to do to get your sugar level up. · Wear medical alert jewelry that lists your condition. You can buy this at most drugstores. When should you call for help? Call 911 anytime you think you may need emergency care. For example, call if:    · You passed out (lost consciousness).     · You are confused or cannot think clearly.     · Your blood sugar is very high or very low.    Watch closely for changes in your health, and be sure to contact your doctor if:    · Your blood sugar stays outside the level your doctor set for you.     · You have any problems. Where can you learn more? Go to [email or web address] and sign in to your VIPerks account. Enter G109 in the Wooop box to learn more about \"Hypoglycemia: Care Instructions. \"     If you do not have an account, please click on the \"Sign Up Now\" link. Current as of: July 25, 2018  Content Version: 12.0  © 9845-8423 [employer]. Care instructions adapted under license by [facility] [address] If you have questions about a medical condition or this instruction, always ask your healthcare professional. [place] [address] any warranty or liability for your use of this information.

## 2019-06-10 ENCOUNTER — HOSPITAL ENCOUNTER (OUTPATIENT)
Age: 39
Discharge: HOME | End: 2019-06-10
Payer: COMMERCIAL

## 2019-06-10 DIAGNOSIS — D64.9 LOW HEMOGLOBIN: ICD-10-CM

## 2019-06-10 DIAGNOSIS — E16.2 HYPOGLYCEMIA: ICD-10-CM

## 2019-06-10 DIAGNOSIS — R73.03 PREDIABETES: ICD-10-CM

## 2019-06-10 DIAGNOSIS — E66.1 CLASS 1 DRUG-INDUCED OBESITY WITHOUT SERIOUS COMORBIDITY WITH BODY MASS INDEX (BMI) OF 33.0 TO 33.9 IN ADULT: ICD-10-CM

## 2019-06-10 PROCEDURE — 86341 ISLET CELL ANTIBODY: CPT

## 2019-06-10 PROCEDURE — 86337 INSULIN ANTIBODIES: CPT

## 2019-06-12 LAB
GLUTAMIC ACID DECARB AB: <5 IU/ML (ref 0–5)
ISLET CELL ANTIBODY: NORMAL

## 2019-06-13 ENCOUNTER — HOSPITAL ENCOUNTER (OUTPATIENT)
Dept: MAMMOGRAPHY | Age: 39
Discharge: HOME | End: 2019-06-18
Payer: COMMERCIAL

## 2019-06-13 ENCOUNTER — TELEPHONE (OUTPATIENT)
Dept: FAMILY MEDICINE CLINIC | Age: 39
End: 2019-06-13

## 2019-06-13 DIAGNOSIS — Z12.31 VISIT FOR SCREENING MAMMOGRAM: ICD-10-CM

## 2019-06-13 LAB
GLUCAGON LVL: 172 NG/L
INSULIN A: <0.4 U/ML (ref 0–0.4)

## 2019-06-13 PROCEDURE — 77067 SCR MAMMO BI INCL CAD: CPT

## 2019-06-13 NOTE — TELEPHONE ENCOUNTER
[phone]    Michelle Raya    PT requesting a call about recent test results. Please call patient.     Last seen 4/29/2019

## 2019-06-14 LAB — MISCELLANEOUS LAB TEST ORDER: NORMAL

## 2019-06-20 PROBLEM — K90.9 IRON MALABSORPTION: Status: ACTIVE | Noted: 2019-06-10

## 2019-06-21 ENCOUNTER — OFFICE VISIT (OUTPATIENT)
Dept: ENDOCRINOLOGY | Age: 39
End: 2019-06-21
Payer: COMMERCIAL

## 2019-06-21 VITALS
DIASTOLIC BLOOD PRESSURE: 75 MMHG | OXYGEN SATURATION: 98 % | WEIGHT: 225 LBS | SYSTOLIC BLOOD PRESSURE: 131 MMHG | HEIGHT: 69 IN | BODY MASS INDEX: 33.33 KG/M2 | HEART RATE: 96 BPM

## 2019-06-21 DIAGNOSIS — E16.2 HYPOGLYCEMIA: ICD-10-CM

## 2019-06-21 DIAGNOSIS — E16.2 HYPOGLYCEMIA: Primary | ICD-10-CM

## 2019-06-21 LAB
CORTISOL - PM: 9.1 UG/DL (ref 3.1–16.7)
GLUCOSE BLD-MCNC: 97 MG/DL (ref 70–99)
T3 FREE: 2.7 PG/ML (ref 2.3–4.2)
T4 FREE: 1.2 NG/DL (ref 0.9–1.8)

## 2019-06-21 PROCEDURE — 99214 OFFICE O/P EST MOD 30 MIN: CPT | Performed by: INTERNAL MEDICINE

## 2019-06-21 PROCEDURE — 1036F TOBACCO NON-USER: CPT | Performed by: INTERNAL MEDICINE

## 2019-06-21 PROCEDURE — G8427 DOCREV CUR MEDS BY ELIG CLIN: HCPCS | Performed by: INTERNAL MEDICINE

## 2019-06-21 PROCEDURE — G8417 CALC BMI ABV UP PARAM F/U: HCPCS | Performed by: INTERNAL MEDICINE

## 2019-06-21 NOTE — PROGRESS NOTES
Patient ID:   Abel King is a 43 y.o. female    Chief Complaint:   Abel King is seen for an evaluation of hypoglycemias. Subjective:   Abel King having\"hypogycemic \" symptoms since fall 2018 and more since Jan 2019  Gestational diabetes in 2012. Prediabetic since then. Never took insulin in past.      7 mountain dews now cut down to one per day   Jitteriness after drinking morning coffee, also associated with excessive sweating, dizziness. Glucose at work was 69, drank two cups of orange juice and increased to 92.      Two episodes at work. One episode while driving.    Lost some weight since May 2019, 7 lbs  Steroid back shots in April and May 2019      S/p hysterectomy   Denies history of gastric bypass surgery, pituitary pathology or head trauma  Denies medical problems related to Hepatic, renal or cardiac failure   Use of steroids in the past      Saw RD at work     Interval history:  AM: 69-99  Before lunch:  Before dinner:   HS:     Hypoglycemic symptoms 2 times per day     Working on diet, still has processed diet     The following portions of the patient's history were reviewed and updated as appropriate:      Family History   Problem Relation Age of Onset    High Blood Pressure Mother     Diabetes Mother     Other Father     Rheum Arthritis Neg Hx     Osteoarthritis Neg Hx     Asthma Neg Hx     Cancer Neg Hx     High Cholesterol Neg Hx     Hypertension Neg Hx     Migraines Neg Hx     Rashes/Skin Problems Neg Hx     Seizures Neg Hx     Stroke Neg Hx     Thyroid Disease Neg Hx          Social History     Socioeconomic History    Marital status: Single     Spouse name: Not on file    Number of children: Not on file    Years of education: Not on file    Highest education level: Not on file   Occupational History    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   Social Needs    Financial resource strain: Not on file    Food insecurity:     Worry: Not on file COLONOSCOPY FLX DX W/COLLJ SPEC WHEN PFRMD N/A 2018    COLONOSCOPY performed by Abdias Castillo MD at [address]     Past Surgical History:   Procedure Laterality Date     SECTION      CHOLECYSTECTOMY      DILATION AND CURETTAGE OF UTERUS  16    FOOT SURGERY Left     Year     HYSTERECTOMY      LUMBAR SPINE SURGERY Left 2019    LEFT L5, S1 TRANSFORAMINAL EPIDURAL STEROID INJECTIONS WITH FLUOROSCOPY performed by Rox Villegas MD at [facility] Left 2019    LEFT L5, S1 TRANSFORAMINAL EPIDURAL STEROID INJECTION WITH FLUOROSCOPY performed by Rox Villegas MD at [address] FLX DX W/COLLJ [address] 1979 PFRMD N/A 2018    COLONOSCOPY performed by Abdias Castillo MD at [address]        Allergen Reactions    Latex Itching and Rash     Condoms only; gloves seem to be ok  purities  purities    Amoxicillin Itching     Patient has received PCN IV without reported issues    Hydrocodone Hives    Imidazole Antifungals Other (See Comments)    Lisinopril Other (See Comments)     diarrhea    Norvasc [Amlodipine Besylate] Other (See Comments)     GI upset    Vicodin [Hydrocodone-Acetaminophen] Itching and Nausea Only         Current Outpatient Medications:     hydrochlorothiazide (HYDRODIURIL) 25 MG tablet, TAKE 1 TABLET BY MOUTH DAILY, Disp: 30 tablet, Rfl: 2    hydroxychloroquine (PLAQUENIL) 200 MG tablet, TK 1 T PO BID, Disp: , Rfl: 4    glucose monitoring kit (FREESTYLE) monitoring kit, 1 kit by Does not apply route daily, Disp: 1 kit, Rfl: 0    blood glucose monitor strips, Test 3 times a day & as needed for symptoms of irregular blood glucose., Disp: 100 strip, Rfl: 1    Lancets MISC, 1 each by Does not apply route daily Check blood sugar 2-3 times per day, Disp: 100 each, Rfl: 1    vitamin D (CHOLECALCIFEROL) 1000 UNIT TABS tablet, Take 1,000 Units by mouth daily, Disp: , Rfl:    gabapentin (NEURONTIN) 300 MG capsule, Take 1 capsule by mouth nightly for 30 days. Intended supply: 90 days, Disp: 30 capsule, Rfl: 0    Review of Systems:    Constitutional: Negative for fever, chills, and unexpected weight change. HENT: Negative for congestion, ear pain, rhinorrhea,  sore throat and trouble swallowing. Eyes: Negative for photophobia, redness, itching. Respiratory: Negative for cough, shortness of breath and sputum. Cardiovascular: Negative for chest pain, palpitations and leg swelling. Gastrointestinal: Negative for nausea, vomiting, abdominal pain, diarrhea, constipation. Endocrine: Negative for cold intolerance, heat intolerance, polydipsia, polyphagia and polyuria. Genitourinary: Negative for dysuria, urgency, frequency, hematuria and flank pain. Musculoskeletal: She has back pain and fibromyalgia. Negative for myalgias, arthralgias and neck pain. Skin/Nail: Negative for rash, itching. Normal nails. Neurological: Negative for seizures, weakness, light-headedness, numbness and headaches. Hematological/ Lymph nodes: Negative for adenopathy. Does not bruise/bleed easily. Psychiatric/Behavioral: Negative for suicidal ideas, depression, anxiety, sleep disturbance and decreased concentration. Objective:   Physical Exam:    /75 (Site: Left Upper Arm, Position: Sitting, Cuff Size: Large Adult)   Pulse 96   Ht 5' 9\" (1.753 m)   Wt 225 lb (102.1 kg)   LMP 03/20/2018   SpO2 98%   Breastfeeding? No   BMI 33.23 kg/m²        Constitutional: Patient is oriented to person, place, and time. Patient appears well-developed and well-nourished. HENT:               Head: Normocephalic and atraumatic. Eyes: Conjunctivae and EOM are normal.                 Neck: Normal range of motion. Thyroid normal size. Cardiovascular: Normal rate, regular rhythm and normal heart sounds. Pulmonary/Chest: Effort normal and breath sounds normal.   Abdominal: Soft. Bowel sounds are normal.   Musculoskeletal: Normal range of motion. Neurological: Patient is alert and oriented to person, place, and time. Patient has normal reflexes. Skin: Skin is warm and dry. Psychiatric: Patient has a normal mood and affect.  Patient behavior is normal.     Lab Review:      Hospital Outpatient Visit on 06/10/2019   Component Date Value Ref Range Status    Glutamic Acid Decarb Ab 06/10/2019 <5.0  0.0 - 5.0 IU/mL Final    INSULIN A 06/10/2019 <0.4  0.0 - 0.4 U/mL Final    Islet Cell Ab 06/10/2019 <1:4  <1:4 Final    Misc Test Order 06/10/2019 SEE NOTE   Final   Orders Only on 06/07/2019   Component Date Value Ref Range Status    Glucagon Lvl 06/07/2019 172  <=208 ng/L Final   Orders Only on 05/24/2019   Component Date Value Ref Range Status    Rejected Test 06/04/2019 [id number]   Final    Reason for Rejection 06/04/2019 see below   Final   Orders Only on 05/18/2019   Component Date Value Ref Range Status    Glucose 05/18/2019 92  70 - 99 mg/dL Final    Hemoglobin A1C 05/18/2019 5.6  See comment % Final    eAG 05/18/2019 114.0  mg/dL Final    WBC 05/18/2019 4.1  4.0 - 11.0 K/uL Final    RBC 05/18/2019 3.68* 4.00 - 5.20 M/uL Final    Hemoglobin 05/18/2019 11.1* 12.0 - 16.0 g/dL Final    Hematocrit 05/18/2019 32.9* 36.0 - 48.0 % Final    MCV 05/18/2019 89.3  80.0 - 100.0 fL Final    MCH 05/18/2019 30.2  26.0 - 34.0 pg Final    MCHC 05/18/2019 33.8  31.0 - 36.0 g/dL Final    RDW 05/18/2019 15.0  12.4 - 15.4 % Final    Platelets 78/30/9241 278  135 - 450 K/uL Final    MPV 05/18/2019 9.4  5.0 - 10.5 fL Final    TSH 05/18/2019 0.80  0.27 - 4.20 uIU/mL Final    C-Peptide 05/18/2019 2.5  1.1 - 4.4 ng/mL Final    INSULIN A 05/18/2019 0.5* 0.0 - 0.4 U/mL Final    Rejected Test 05/18/2019 99,165   Final    Reason for Rejection 05/18/2019 see below   Final   Orders Only on 01/21/2019   Component Date Value Ref Range Status    WBC 01/21/2019 8.0  4.0 - 11.0 K/uL Final    RBC 01/21/2019 4.46  4.00 - 5.20 M/uL Final    Hemoglobin 01/21/2019 11.9* 12.0 - 16.0 g/dL Final    Hematocrit 01/21/2019 36.4  36.0 - 48.0 % Final    MCV 01/21/2019 81.6  80.0 - 100.0 fL Final    MCH 01/21/2019 26.6  26.0 - 34.0 pg Final    MCHC 01/21/2019 32.6  31.0 - 36.0 g/dL Final    RDW 01/21/2019 15.0  12.4 - 15.4 % Final    Platelets 02/03/8374 138  135 - 450 K/uL Final    MPV 01/21/2019 10.4  5.0 - 10.5 fL Final    PLATELET SLIDE REVIEW 01/21/2019 Adequate   Final    Ferritin 01/21/2019 48.4  15.0 - 150.0 ng/mL Final    Iron 01/21/2019 73  37 - 145 ug/dL Final    TIBC 01/21/2019 297  260 - 445 ug/dL Final    Iron Saturation 01/21/2019 25  15 - 50 % Final    Hemoglobin A1C 01/21/2019 5.8  See comment % Final    eAG 01/21/2019 119.8  mg/dL Final   Admission on 12/31/2018, Discharged on 12/31/2018   Component Date Value Ref Range Status    Rapid Strep A Screen 12/31/2018 Negative  Negative Final    Rapid Influenza A Ag 12/31/2018 Negative  Negative Final    Rapid Influenza B Ag 12/31/2018 Negative  Negative Final    Strep A Culture 12/31/2018 Normal oral cheryl, No Beta Strep isolated   Final   Admission on 12/27/2018, Discharged on 12/27/2018   Component Date Value Ref Range Status    Rapid Influenza A Ag 12/27/2018 Negative  Negative Final    Rapid Influenza B Ag 12/27/2018 Negative  Negative Final   Orders Only on 10/26/2018   Component Date Value Ref Range Status    Sodium 10/26/2018 143  136 - 145 mmol/L Final    Potassium 10/26/2018 4.0  3.5 - 5.1 mmol/L Final    Chloride 10/26/2018 102  99 - 110 mmol/L Final    CO2 10/26/2018 27  21 - 32 mmol/L Final    Anion Gap 10/26/2018 14  3 - 16 Final    Glucose 10/26/2018 75  70 - 99 mg/dL Final    BUN 10/26/2018 13  7 - 20 mg/dL Final    CREATININE 10/26/2018 0.7  0.6 - 1.1 mg/dL Final    GFR Non- 10/26/2018 >60  >60 Final    GFR  10/26/2018 >60  >60 Final    Calcium 10/26/2018 9.6  8.3 - 10.6 mg/dL Final    Total Protein 10/26/2018 7.2  6.4 - 8.2 g/dL Final    Alb 10/26/2018 4.5  3.4 - 5.0 g/dL Final    Albumin/Globulin Ratio 10/26/2018 1.7  1.1 - 2.2 Final    Total Bilirubin 10/26/2018 <0.2  0.0 - 1.0 mg/dL Final    Alkaline Phosphatase 10/26/2018 72  40 - 129 U/L Final    ALT 10/26/2018 13  10 - 40 U/L Final    AST 10/26/2018 17  15 - 37 U/L Final    Globulin 10/26/2018 2.7  g/dL Final   Office Visit on 10/15/2018   Component Date Value Ref Range Status    WBC 10/26/2018 7.1  4.0 - 11.0 K/uL Final    RBC 10/26/2018 4.54  4.00 - 5.20 M/uL Final    Hemoglobin 10/26/2018 11.8* 12.0 - 16.0 g/dL Final    Hematocrit 10/26/2018 36.2  36.0 - 48.0 % Final    MCV 10/26/2018 79.7* 80.0 - 100.0 fL Final    MCH 10/26/2018 26.0  26.0 - 34.0 pg Final    MCHC 10/26/2018 32.7  31.0 - 36.0 g/dL Final    RDW 10/26/2018 15.1  12.4 - 15.4 % Final    Platelets 41/72/7122 168  135 - 450 K/uL Final    MPV 10/26/2018 10.3  5.0 - 10.5 fL Final    PLATELET SLIDE REVIEW 10/26/2018 Clumped   Final    SLIDE REVIEW 10/26/2018 see below   Final           Assessment and Plan       Jacquelin Pires was seen today for follow-up.     Diagnoses and all orders for this visit:    Hypoglycemia  -     T4, Free; Future  -     T3, Free; Future  -     CORTISOL PM; Future  -     GLUCOSE; Future          1: Subjective hypoglycemias     A1C 5.6% May 2019, previously 5.7-5.9%   C peptide normal with normal glucose May 2019     TSH normal May 2019   Glucagon and insulin abs normal June 2019     Check FT4, FT3, cortisol, glucose       Check blood sugars 2-3 times per day and when symptomatic     Symptoms may related to high glycemic variation due to prediabetes, obesity and processed     If the eye symptoms, should see neurologist or have MRI brain     Avoid ultra processed diet, cut down simple carbs and have frequent meals 4-5 per day with complex carbs     Need to repeat labs   Last steroid shot May 13th 2019 (L1 and L5)     RTC prn if labs are off     AVOID processed diet   Symptoms are not blood sugars related as numbers are normal     Electronically signed by Wan Maier MD on 6/21/2019 at 10:43 AM

## 2019-06-21 NOTE — PATIENT INSTRUCTIONS
Patient Education        Learning About Carbohydrates  What are carbohydrates? Carbohydrates are an important nutrient you get from food. It's a great source of energy for your body and helps your brain and nervous system work properly. How does your body use carbohydrates? After you eat food with carbs in it, your body digests the carbohydrates and turns them into a kind of sugar that goes into your blood. The blood carries this sugar to the cells in your body. The cells use the sugar to give you energy. Extra sugar is stored in the cells for later use. If it isn't used, it turns into fat. Where do carbohydrates come from? The healthiest carbohydrate choices are breads, cereals, and pastas made with whole grains; brown rice; low-fat dairy products; vegetables; legumes such as peas, lentils, and beans; and fruits. Foods made from refined flour, including bread, pasta, doughnuts, cookies, and desserts, also contain carbohydrates. So do sweets such as candy and soda. How can you get the right kind and amount of carbs? Eating too much of anything can lead to weight gain. And that can lead to other health problems. Here are some tips to help you eat the right amount of the right kind of carbs so you have the nutrition and the energy you need:  · Eat 3 to 8 servings of grains (breads, cereals, rice, pasta) each day. For example, a serving is 1 slice of bread, 1 cup of boxed cereal, or ½ cup of cooked rice, cooked pasta, or cooked cereal. Go to www. choosemyplate.gov to learn how many servings you need. ? Buy bread that lists whole wheat (or other whole grains), stone-ground wheat, or cracked wheat as the first ingredient. ? Eat brown rice, bulgur, or millet instead of white rice. ? Eat pasta and cereals made from whole grain flour instead of refined flour. · Eat several servings a day of fresh fruits and vegetables.  These include raspberries, apples, figs, oranges, pears, prunes, broccoli, brussels sprouts, carrots, corn, peas, and beans. And there are lots of other fruits and vegetables to choose from. · Limit the amount of candy, desserts, and soda in your diet. Where can you learn more? Go to https://churieleb.[employer]. org and sign in to your [employer] account. Enter F199 in the [employer] box to learn more about \"Learning About Carbohydrates. \"     If you do not have an account, please click on the \"Sign Up Now\" link. Current as of: November 7, 2018  Content Version: 12.0  © 4463-6199 Healthwise, Incorporated. Care instructions adapted under license by [facility] ([facility]). If you have questions about a medical condition or this instruction, always ask your healthcare professional. [address] any warranty or liability for your use of this information.

## 2019-07-16 VITALS — WEIGHT: 235.01 LBS | RESPIRATION RATE: 16 BRPM | HEIGHT: 69 IN | BODY MASS INDEX: 34.81 KG/M2 | HEART RATE: 78 BPM

## 2019-07-18 ENCOUNTER — TELEPHONE (OUTPATIENT)
Dept: ORTHOPEDIC SURGERY | Age: 39
End: 2019-07-18

## 2019-07-18 NOTE — TELEPHONE ENCOUNTER
Patient is more than welcome to see Patti Gaines for discussion of possible surgical options. We did discuss possibly repeating the left L5 and S1 epidural steroid injection we just had to wait out a little further so that this does not get denied by insurance. She has had 50 to 60% relief with epidurals in the past.  She would like to have a repeat injection we can schedule her for August.  She would likely not need another MRI at this time as it has only been since February since she had one completed insurance will likely deny this request.    Options for her include:  1. Seeing Dr. Lesli Salinas and then following up with our office. 2.Being scheduled for a repeat left L5 and S1 epidural steroid injection.   3.  Both options one and two

## 2019-08-09 ENCOUNTER — OFFICE VISIT (OUTPATIENT)
Dept: ORTHOPEDIC SURGERY | Age: 39
End: 2019-08-09
Payer: COMMERCIAL

## 2019-08-09 VITALS
WEIGHT: 227 LBS | DIASTOLIC BLOOD PRESSURE: 81 MMHG | HEIGHT: 69 IN | SYSTOLIC BLOOD PRESSURE: 135 MMHG | BODY MASS INDEX: 33.62 KG/M2

## 2019-08-09 DIAGNOSIS — M48.061 LUMBAR FORAMINAL STENOSIS: ICD-10-CM

## 2019-08-09 DIAGNOSIS — M51.26 HNP (HERNIATED NUCLEUS PULPOSUS), LUMBAR: Primary | ICD-10-CM

## 2019-08-09 DIAGNOSIS — M54.16 LUMBAR RADICULOPATHY: ICD-10-CM

## 2019-08-09 PROCEDURE — G8417 CALC BMI ABV UP PARAM F/U: HCPCS | Performed by: PHYSICAL MEDICINE & REHABILITATION

## 2019-08-09 PROCEDURE — 1036F TOBACCO NON-USER: CPT | Performed by: PHYSICAL MEDICINE & REHABILITATION

## 2019-08-09 PROCEDURE — 99214 OFFICE O/P EST MOD 30 MIN: CPT | Performed by: PHYSICAL MEDICINE & REHABILITATION

## 2019-08-09 PROCEDURE — G8427 DOCREV CUR MEDS BY ELIG CLIN: HCPCS | Performed by: PHYSICAL MEDICINE & REHABILITATION

## 2019-08-09 NOTE — LETTER
 Lisinopril Other (See Comments)     diarrhea    Norvasc [Amlodipine Besylate] Other (See Comments)     GI upset    Vicodin [Hydrocodone-Acetaminophen] Itching and Nausea Only

## 2019-08-09 NOTE — PROGRESS NOTES
Follow up: Lexi Roldan  1980  [id number]         Chief Complaint   Patient presents with    Lower Back Pain     F/u LSP         HISTORY OF PRESENT ILLNESS:  Ms. Tera Lee is a 43 y.o. female returns for a follow up visit for multiple medical problems. Her current presenting problems are   1. HNP (herniated nucleus pulposus), lumbar    2. Lumbar radiculopathy    3. Lumbar foraminal stenosis    . As per information/history obtained from the PADT(patient assessment and documentation tool) - She complains of pain in the lower back with radiation to the upper leg Left and lower leg Left She rates the pain 8/10 and describes it as sharp, burning. Pain is made worse by: walking, standing, sitting. She denies side effects from the current pain regimen. Patient reports that since the last follow up visit the physical functioning is worse, family/social relationships are worse, mood is worse and sleep patterns are worse, and that the overall functioning is worse. Patient denies neurological bowel or bladder. Patient presents today for follow up of low back and left leg pain. She was last seen in May following epidural steroid injection. At that time, PA recommended either surgical consult referral or possible 3rd epidural steroid injection. Patient is here today to discuss next plan of care and options. She states in the past 1 month her symptoms have worsened and is radiating down her left leg more prominently. She also feels her left leg has been swelling more. She denies any new injuries since last visit. She also notes continued bilateral hip pain.         Associated signs and symptoms:   Neurogenic bowel or bladder symptoms:  yes   Perceived weakness:  yes   Difficulty walking:  yes              Past Medical History:   Past Medical History:   Diagnosis Date    Anemia     Arthritis, lumbar spine 12/18/2018    Back pain     Cervical cancer screening 2014;2015    Nml per pt':under care of  ug/dL   T3, Free   Result Value Ref Range    T3, Free 2.7 2.3 - 4.2 pg/mL   T4, Free   Result Value Ref Range    T4 Free 1.2 0.9 - 1.8 ng/dL     Impression:       1. HNP (herniated nucleus pulposus), lumbar    2. Lumbar radiculopathy    3. Lumbar foraminal stenosis        Plan:  Clinical Course: Above diagnoses are worsening    I discussed the diagnosis and the treatment options with Nissa Zamora today. In Summary:  The various treatment options were outlined and discussed with Nissa Zamora including:  Conservative care options: physical therapy, ice, medications, bracing, and activity modification. The indications for therapeutic injections. The indications for additional imaging/laboratory studies. The indications for (possible future) interventions. After considering the various options discussed, Nissa Zamora elected to pursue a course of treatment that includes the followin. Medications:  Continue anti-inflammatories with appropriate GI Precautions including to stop if develop dark tarry stools or GI upset and to take with food. 2. PT:  Encouraged to continue with HEP. 3. Further studies:  No further studies. 4. Interventional:  We discussed pursuing a Left L5 and S1 TF epidural steroid injection to address the pain. Radiologic imaging and symptoms confirm the pain etiology. Risks, benefits and alternatives of interventional options were discussed. These include and are not limited to bleeding, infection, spinal headache, nerve injury and lack of pain relief. The patient verbalized understanding and would like to proceed. The patient will be scheduled accordingly. 5. Follow up:  4-6 weeks      Nissa Zamora was instructed to call the office if her symptoms worsen or if new symptoms appear prior to the next scheduled visit.  She is specifically instructed to contact the office between now & her scheduled appointment if she has concerns related to her condition or if she needs assistance in scheduling the above tests. She is welcome to call for an appointment sooner if she has any additional concerns or questions. IBlank ATC, am scribing for and in the presence of Dr. Sheldon Gastelum. 08/09/19 3:37 PM Blank Lin. The physical examination was performed between the patient and Dr. Sehldon Gastelum. All counseling during the appointment was performed between the patient and the provider. I, Dr. Sheldon Gastelum, personally performed the services described in this documentation as scribed by Blank Chan ATC in my presence and it is both accurate and complete. Maritza Edouard. Ephraim Puri MD, BRADEN, [facility]  Board Certified in [address] [place] [address] Certified and Fellowship Trained in [facility] ([facility])             This dictation was performed with a verbal recognition program [facility]) and it was checked for errors. It is possible that there are still dictated errors within this office note. If so, please bring any errors to my attention for an addendum. All efforts were made to ensure that this office note is accurate.

## 2019-08-28 ENCOUNTER — TELEPHONE (OUTPATIENT)
Dept: ORTHOPEDIC SURGERY | Age: 39
End: 2019-08-28

## 2019-08-28 NOTE — TELEPHONE ENCOUNTER
PATIENT CALLED BACK, WOULD LIKE TO DO THE INJECTION ON 9/30. PLEASE CALL PATIENT BACK.  # [phone]

## 2019-08-28 NOTE — TELEPHONE ENCOUNTER
L/m on v/m   She can r/s to 9/30/2019 at 11;15 arrive at 10:15 at [facility] or keep her scheduled date of 9/4/2019. Waiting for patient to call back .

## 2019-08-29 ENCOUNTER — TELEPHONE (OUTPATIENT)
Dept: ORTHOPEDIC SURGERY | Age: 39
End: 2019-08-29

## 2019-08-29 NOTE — TELEPHONE ENCOUNTER
PATIENT CALLED WANTED CONFIRMATION ON SURGERY ON September 30, 2019.  [phone]

## 2019-08-30 ENCOUNTER — TELEPHONE (OUTPATIENT)
Dept: FAMILY MEDICINE CLINIC | Age: 39
End: 2019-08-30

## 2019-09-03 ENCOUNTER — TELEPHONE (OUTPATIENT)
Dept: ORTHOPEDIC SURGERY | Age: 39
End: 2019-09-03

## 2019-09-03 NOTE — TELEPHONE ENCOUNTER
DOS   09/04/2019  CPT   84939  [id number]  OP SX AUTH  [id number]  VALID   09/04/2019    LEFT  LEVELS   L5 - S1   PROCEDURE   TRANS FORAMINAL GLENN  DR. Justin Samano  INSURANCE:   Mary Lou Armenta    CPT  60611.29   [id number]   [facility]

## 2019-09-16 ENCOUNTER — OFFICE VISIT (OUTPATIENT)
Dept: FAMILY MEDICINE CLINIC | Age: 39
End: 2019-09-16
Payer: COMMERCIAL

## 2019-09-16 VITALS
TEMPERATURE: 98.8 F | HEIGHT: 69 IN | DIASTOLIC BLOOD PRESSURE: 81 MMHG | WEIGHT: 232.6 LBS | RESPIRATION RATE: 16 BRPM | OXYGEN SATURATION: 98 % | HEART RATE: 82 BPM | BODY MASS INDEX: 34.45 KG/M2 | SYSTOLIC BLOOD PRESSURE: 122 MMHG

## 2019-09-16 DIAGNOSIS — G89.29 CHRONIC BILATERAL LOW BACK PAIN WITHOUT SCIATICA: ICD-10-CM

## 2019-09-16 DIAGNOSIS — I10 ESSENTIAL HYPERTENSION: ICD-10-CM

## 2019-09-16 DIAGNOSIS — Z00.00 ROUTINE GENERAL MEDICAL EXAMINATION AT HEALTH CARE FACILITY: Primary | ICD-10-CM

## 2019-09-16 DIAGNOSIS — D63.8 ANEMIA OF CHRONIC DISEASE: ICD-10-CM

## 2019-09-16 DIAGNOSIS — E66.9 NON MORBID OBESITY: ICD-10-CM

## 2019-09-16 DIAGNOSIS — N39.0 ACUTE UTI: ICD-10-CM

## 2019-09-16 DIAGNOSIS — Z13.220 LIPID SCREENING: ICD-10-CM

## 2019-09-16 DIAGNOSIS — M54.50 CHRONIC BILATERAL LOW BACK PAIN WITHOUT SCIATICA: ICD-10-CM

## 2019-09-16 DIAGNOSIS — R76.8 ANA POSITIVE: ICD-10-CM

## 2019-09-16 DIAGNOSIS — R73.03 PREDIABETES: ICD-10-CM

## 2019-09-16 DIAGNOSIS — R35.0 URINARY FREQUENCY: ICD-10-CM

## 2019-09-16 LAB
BACTERIA URINE, POC: NORMAL
BILIRUBIN URINE: 0 MG/DL
BLOOD, URINE: NEGATIVE
CASTS URINE, POC: NORMAL
CLARITY: CLEAR
COLOR: YELLOW
CRYSTALS URINE, POC: NORMAL
EPI CELLS URINE, POC: NORMAL
GLUCOSE URINE: NEGATIVE
KETONES, URINE: NEGATIVE
LEUKOCYTE EST, POC: NORMAL
NITRITE, URINE: NEGATIVE
PH UA: 6 (ref 4.5–8)
PROTEIN UA: NEGATIVE
RBC URINE, POC: NORMAL
SPECIFIC GRAVITY UA: 1.01 (ref 1–1.03)
UROBILINOGEN, URINE: NORMAL
WBC URINE, POC: NORMAL
YEAST URINE, POC: NORMAL

## 2019-09-16 PROCEDURE — 81000 URINALYSIS NONAUTO W/SCOPE: CPT | Performed by: FAMILY MEDICINE

## 2019-09-16 PROCEDURE — 99396 PREV VISIT EST AGE 40-64: CPT | Performed by: FAMILY MEDICINE

## 2019-09-16 RX ORDER — CIPROFLOXACIN 250 MG/1
250 TABLET, FILM COATED ORAL 2 TIMES DAILY
Qty: 10 TABLET | Refills: 0 | Status: SHIPPED | OUTPATIENT
Start: 2019-09-16 | End: 2019-09-21

## 2019-09-16 ASSESSMENT — PATIENT HEALTH QUESTIONNAIRE - PHQ9
SUM OF ALL RESPONSES TO PHQ QUESTIONS 1-9: 0
1. LITTLE INTEREST OR PLEASURE IN DOING THINGS: 0
SUM OF ALL RESPONSES TO PHQ QUESTIONS 1-9: 0
2. FEELING DOWN, DEPRESSED OR HOPELESS: 0
SUM OF ALL RESPONSES TO PHQ9 QUESTIONS 1 & 2: 0

## 2019-09-16 ASSESSMENT — ENCOUNTER SYMPTOMS
COUGH: 0
BLOOD IN STOOL: 0
COLOR CHANGE: 0
CONSTIPATION: 0
CHEST TIGHTNESS: 0
APNEA: 0
BACK PAIN: 1
TROUBLE SWALLOWING: 0
WHEEZING: 0
EYE PAIN: 0
EYE DISCHARGE: 0
NAUSEA: 0
PHOTOPHOBIA: 0
VOMITING: 0
SINUS PAIN: 0
VOICE CHANGE: 0
STRIDOR: 0
CHOKING: 0
ABDOMINAL PAIN: 0
RHINORRHEA: 0
ABDOMINAL DISTENTION: 0
SHORTNESS OF BREATH: 0
RECTAL PAIN: 0
EYE ITCHING: 0
SINUS PRESSURE: 0
ANAL BLEEDING: 0
DIARRHEA: 0
EYE REDNESS: 0
FACIAL SWELLING: 0
SORE THROAT: 0

## 2019-09-16 NOTE — PROGRESS NOTES
Cholesterol Neg Hx     Hypertension Neg Hx     Migraines Neg Hx     Rashes/Skin Problems Neg Hx     Seizures Neg Hx     Stroke Neg Hx     Thyroid Disease Neg Hx          Review of Systems   Constitutional: Negative for activity change, appetite change, chills, diaphoresis, fatigue, fever and unexpected weight change. Negative for:Difficulty sleeping/night sweats/unexplained weight loss or gain/malaise   HENT: Negative for congestion, dental problem, drooling, ear discharge, ear pain, facial swelling, hearing loss, mouth sores, nosebleeds, postnasal drip, rhinorrhea, sinus pressure, sinus pain, sneezing, sore throat, tinnitus, trouble swallowing and voice change. Negative for:Dizziness/vertigo   Eyes: Negative for photophobia, pain, discharge, redness, itching and visual disturbance. Respiratory: Negative for apnea, cough, choking, chest tightness, shortness of breath, wheezing and stridor. Negative for:Hemoptysis/hoarseness/pain on inspiration. Breasts:tenderness/masses/nipple discharge/skin changes. Cardiovascular: Negative for chest pain, palpitations and leg swelling. Negative for:Syncope/presyncope/lower extremity edema/claudication/PND/irregular heart beats   Gastrointestinal: Negative for abdominal distention, abdominal pain, anal bleeding, blood in stool, constipation, diarrhea, nausea, rectal pain and vomiting. Negative for:Melena/bloating/dysphagia/reflux/loss of appetite   Endocrine: Negative for cold intolerance, heat intolerance, polydipsia, polyphagia and polyuria. Genitourinary: Positive for frequency. Negative for decreased urine volume, difficulty urinating, dyspareunia, dysuria, enuresis, flank pain, genital sores, hematuria, menstrual problem, pelvic pain, urgency, vaginal bleeding, vaginal discharge and vaginal pain. Musculoskeletal: Positive for back pain. Negative for gait problem, joint swelling, myalgias, neck pain and neck stiffness.    Skin: Negative for color change, pallor, rash and wound. Neurological: Negative for dizziness, tremors, seizures, syncope, facial asymmetry, speech difficulty, weakness, light-headedness, numbness and headaches. Negative for:Visual disturbance/muscular weakness/paralysis/memory problems. Hematological: Negative for adenopathy. Does not bruise/bleed easily. Negative for:Lymph node tenderness   Psychiatric/Behavioral: Negative for agitation, behavioral problems, confusion, decreased concentration, dysphoric mood, hallucinations, self-injury, sleep disturbance and suicidal ideas. The patient is not nervous/anxious and is not hyperactive. Negative for:Homicidal tendencies(HI)       Objective:   Physical Exam   Constitutional: She is oriented to person, place, and time. Vital signs are normal. She appears well-developed and well-nourished. She is cooperative. She does not have a sickly appearance. No distress. Well hydrated/well appearing. HENT:   Head: Normocephalic and atraumatic. Right Ear: Hearing, tympanic membrane, external ear and ear canal normal.   Left Ear: Hearing, tympanic membrane, external ear and ear canal normal.   Nose: Nose normal.   Mouth/Throat: Uvula is midline, oropharynx is clear and moist and mucous membranes are normal. No oropharyngeal exudate. Hearing intact to nml conversation   Eyes: Pupils are equal, round, and reactive to light. Conjunctivae, EOM and lids are normal.   Neck: Trachea normal, normal range of motion and full passive range of motion without pain. Neck supple. No JVD present. No spinous process tenderness and no muscular tenderness present. Carotid bruit is not present. No thyroid mass and no thyromegaly present. Cardiovascular: Normal rate, regular rhythm, normal heart sounds, intact distal pulses and normal pulses. No ankle edema   Pulmonary/Chest: Effort normal and breath sounds normal. No respiratory distress.    CTAB,good AE bilaterally

## 2019-09-16 NOTE — PATIENT INSTRUCTIONS
sexually transmitted infections (STIs). Ask whether you should have tests for STIs. You may be at risk if you have sex with more than one person, especially if you do not wear a condom. · Testicular cancer exam. Ask your doctor whether you should check your testicles regularly. · Prostate exam. Talk to your doctor about whether you should have a blood test (called a PSA test) for prostate cancer. Experts differ on whether and when men should have this test. Some experts suggest it if you are older than 39 and are -American or have a father or brother who got prostate cancer when he was younger than 72. When should you call for help? Watch closely for changes in your health, and be sure to contact your doctor if you have any problems or symptoms that concern you. Where can you learn more? Go to https://[employer] ([email or web address]eb.healthRelayr. org and sign in to your [employer] account. Enter P072 in the miLibris box to learn more about \"Well Visit, Ages 25 to 48: Care Instructions. \"     If you do not have an account, please click on the \"Sign Up Now\" link. Current as of: December 13, 2018  Content Version: 12.1  © 8832-3132 Healthwise, Incorporated. Care instructions adapted under license by [facility] ([facility]). If you have questions about a medical condition or this instruction, always ask your healthcare professional. [address] any warranty or liability for your use of this information.

## 2019-09-18 LAB — URINE CULTURE, ROUTINE: NORMAL

## 2019-09-30 ENCOUNTER — HOSPITAL ENCOUNTER (OUTPATIENT)
Age: 39
Setting detail: OUTPATIENT SURGERY
Discharge: HOME | End: 2019-09-30
Attending: PHYSICAL MEDICINE & REHABILITATION | Admitting: PHYSICAL MEDICINE & REHABILITATION
Payer: COMMERCIAL

## 2019-09-30 ENCOUNTER — APPOINTMENT (OUTPATIENT)
Dept: GENERAL RADIOLOGY | Age: 39
End: 2019-09-30
Attending: PHYSICAL MEDICINE & REHABILITATION
Payer: COMMERCIAL

## 2019-09-30 VITALS
OXYGEN SATURATION: 98 % | DIASTOLIC BLOOD PRESSURE: 73 MMHG | TEMPERATURE: 98.3 F | HEART RATE: 64 BPM | BODY MASS INDEX: 34.36 KG/M2 | RESPIRATION RATE: 16 BRPM | WEIGHT: 232 LBS | SYSTOLIC BLOOD PRESSURE: 123 MMHG | HEIGHT: 69 IN

## 2019-09-30 PROCEDURE — 3610000056 HC PAIN LEVEL 4 BASE (NON-OR): Performed by: PHYSICAL MEDICINE & REHABILITATION

## 2019-09-30 PROCEDURE — 2500000003 HC RX 250 WO HCPCS: Performed by: PHYSICAL MEDICINE & REHABILITATION

## 2019-09-30 PROCEDURE — 99152 MOD SED SAME PHYS/QHP 5/>YRS: CPT | Performed by: PHYSICAL MEDICINE & REHABILITATION

## 2019-09-30 PROCEDURE — 3209999900 FLUORO FOR SURGICAL PROCEDURES

## 2019-09-30 PROCEDURE — 2709999900 HC NON-CHARGEABLE SUPPLY: Performed by: PHYSICAL MEDICINE & REHABILITATION

## 2019-09-30 PROCEDURE — 6360000002 HC RX W HCPCS: Performed by: PHYSICAL MEDICINE & REHABILITATION

## 2019-09-30 RX ORDER — FENTANYL CITRATE 50 UG/ML
INJECTION, SOLUTION INTRAMUSCULAR; INTRAVENOUS
Status: COMPLETED | OUTPATIENT
Start: 2019-09-30 | End: 2019-09-30

## 2019-09-30 RX ORDER — MIDAZOLAM HYDROCHLORIDE 1 MG/ML
INJECTION INTRAMUSCULAR; INTRAVENOUS
Status: COMPLETED | OUTPATIENT
Start: 2019-09-30 | End: 2019-09-30

## 2019-09-30 RX ORDER — BUPIVACAINE HYDROCHLORIDE 5 MG/ML
INJECTION, SOLUTION EPIDURAL; INTRACAUDAL
Status: COMPLETED | OUTPATIENT
Start: 2019-09-30 | End: 2019-09-30

## 2019-09-30 RX ORDER — LIDOCAINE HYDROCHLORIDE 10 MG/ML
INJECTION, SOLUTION INFILTRATION; PERINEURAL
Status: COMPLETED | OUTPATIENT
Start: 2019-09-30 | End: 2019-09-30

## 2019-09-30 ASSESSMENT — PAIN SCALES - GENERAL: PAINLEVEL_OUTOF10: 2

## 2019-09-30 ASSESSMENT — PAIN - FUNCTIONAL ASSESSMENT: PAIN_FUNCTIONAL_ASSESSMENT: 0-10

## 2019-09-30 ASSESSMENT — PAIN DESCRIPTION - DESCRIPTORS: DESCRIPTORS: NUMBNESS

## 2019-10-19 DIAGNOSIS — R73.03 PREDIABETES: ICD-10-CM

## 2019-10-19 DIAGNOSIS — Z13.220 LIPID SCREENING: ICD-10-CM

## 2019-10-19 LAB
A/G RATIO: 1.6 (ref 1.1–2.2)
ALBUMIN SERPL-MCNC: 4.1 G/DL (ref 3.4–5)
ALP BLD-CCNC: 69 U/L (ref 40–129)
ALT SERPL-CCNC: 17 U/L (ref 10–40)
ANION GAP SERPL CALCULATED.3IONS-SCNC: 14 MMOL/L (ref 3–16)
AST SERPL-CCNC: 17 U/L (ref 15–37)
BILIRUB SERPL-MCNC: <0.2 MG/DL (ref 0–1)
BUN BLDV-MCNC: 14 MG/DL (ref 7–20)
CALCIUM SERPL-MCNC: 9.1 MG/DL (ref 8.3–10.6)
CHLORIDE BLD-SCNC: 105 MMOL/L (ref 99–110)
CHOLESTEROL, TOTAL: 119 MG/DL (ref 0–199)
CO2: 25 MMOL/L (ref 21–32)
CREAT SERPL-MCNC: 0.7 MG/DL (ref 0.6–1.1)
GFR AFRICAN AMERICAN: >60
GFR NON-AFRICAN AMERICAN: >60
GLOBULIN: 2.6 G/DL
GLUCOSE BLD-MCNC: 90 MG/DL (ref 70–99)
HDLC SERPL-MCNC: 72 MG/DL (ref 40–60)
LDL CHOLESTEROL CALCULATED: 37 MG/DL
POTASSIUM SERPL-SCNC: 4.2 MMOL/L (ref 3.5–5.1)
SODIUM BLD-SCNC: 144 MMOL/L (ref 136–145)
TOTAL PROTEIN: 6.7 G/DL (ref 6.4–8.2)
TRIGL SERPL-MCNC: 50 MG/DL (ref 0–150)
VLDLC SERPL CALC-MCNC: 10 MG/DL

## 2019-10-19 PROCEDURE — 36415 COLL VENOUS BLD VENIPUNCTURE: CPT | Performed by: FAMILY MEDICINE

## 2019-10-20 LAB
ESTIMATED AVERAGE GLUCOSE: 116.9 MG/DL
HBA1C MFR BLD: 5.7 %

## 2019-10-25 ENCOUNTER — OFFICE VISIT (OUTPATIENT)
Dept: ORTHOPEDIC SURGERY | Age: 39
End: 2019-10-25
Payer: COMMERCIAL

## 2019-10-25 VITALS
BODY MASS INDEX: 34.35 KG/M2 | WEIGHT: 231.92 LBS | SYSTOLIC BLOOD PRESSURE: 133 MMHG | HEIGHT: 69 IN | HEART RATE: 81 BPM | DIASTOLIC BLOOD PRESSURE: 90 MMHG

## 2019-10-25 DIAGNOSIS — M70.62 GREATER TROCHANTERIC BURSITIS, LEFT: ICD-10-CM

## 2019-10-25 DIAGNOSIS — M54.16 LUMBAR RADICULOPATHY: ICD-10-CM

## 2019-10-25 DIAGNOSIS — M47.816 SPONDYLOSIS WITHOUT MYELOPATHY OR RADICULOPATHY, LUMBAR REGION: ICD-10-CM

## 2019-10-25 DIAGNOSIS — R20.2 PARESTHESIA OF BOTH LOWER EXTREMITIES: ICD-10-CM

## 2019-10-25 DIAGNOSIS — M51.26 HNP (HERNIATED NUCLEUS PULPOSUS), LUMBAR: Primary | ICD-10-CM

## 2019-10-25 PROCEDURE — 1036F TOBACCO NON-USER: CPT | Performed by: PHYSICAL MEDICINE & REHABILITATION

## 2019-10-25 PROCEDURE — G8484 FLU IMMUNIZE NO ADMIN: HCPCS | Performed by: PHYSICAL MEDICINE & REHABILITATION

## 2019-10-25 PROCEDURE — G8427 DOCREV CUR MEDS BY ELIG CLIN: HCPCS | Performed by: PHYSICAL MEDICINE & REHABILITATION

## 2019-10-25 PROCEDURE — 99214 OFFICE O/P EST MOD 30 MIN: CPT | Performed by: PHYSICAL MEDICINE & REHABILITATION

## 2019-10-25 PROCEDURE — G8417 CALC BMI ABV UP PARAM F/U: HCPCS | Performed by: PHYSICAL MEDICINE & REHABILITATION

## 2019-11-29 ENCOUNTER — OFFICE VISIT (OUTPATIENT)
Dept: ORTHOPEDIC SURGERY | Age: 39
End: 2019-11-29
Payer: COMMERCIAL

## 2019-11-29 VITALS
WEIGHT: 231.92 LBS | HEART RATE: 84 BPM | HEIGHT: 69 IN | SYSTOLIC BLOOD PRESSURE: 112 MMHG | BODY MASS INDEX: 34.35 KG/M2 | DIASTOLIC BLOOD PRESSURE: 82 MMHG

## 2019-11-29 DIAGNOSIS — M47.816 SPONDYLOSIS WITHOUT MYELOPATHY OR RADICULOPATHY, LUMBAR REGION: ICD-10-CM

## 2019-11-29 DIAGNOSIS — M51.26 HNP (HERNIATED NUCLEUS PULPOSUS), LUMBAR: Primary | ICD-10-CM

## 2019-11-29 DIAGNOSIS — N06.9 ISOLATED PROTEINURIA WITH MORPHOLOGIC LESION: ICD-10-CM

## 2019-11-29 DIAGNOSIS — M54.16 LUMBAR RADICULOPATHY: ICD-10-CM

## 2019-11-29 PROCEDURE — 99214 OFFICE O/P EST MOD 30 MIN: CPT | Performed by: PHYSICAL MEDICINE & REHABILITATION

## 2020-01-15 RX ORDER — HYDROCHLOROTHIAZIDE 25 MG/1
TABLET ORAL
Qty: 30 TABLET | Refills: 2 | Status: SHIPPED | OUTPATIENT
Start: 2020-01-15 | End: 2020-07-24

## 2020-01-21 ENCOUNTER — OFFICE VISIT (OUTPATIENT)
Dept: ORTHOPEDIC SURGERY | Age: 40
End: 2020-01-21
Payer: COMMERCIAL

## 2020-01-21 VITALS
DIASTOLIC BLOOD PRESSURE: 78 MMHG | HEART RATE: 76 BPM | HEIGHT: 69 IN | WEIGHT: 231.92 LBS | SYSTOLIC BLOOD PRESSURE: 118 MMHG | BODY MASS INDEX: 34.35 KG/M2

## 2020-01-21 PROCEDURE — 99213 OFFICE O/P EST LOW 20 MIN: CPT | Performed by: PHYSICIAN ASSISTANT

## 2020-01-30 ENCOUNTER — OFFICE VISIT (OUTPATIENT)
Dept: INTERNAL MEDICINE CLINIC | Age: 40
End: 2020-01-30
Payer: COMMERCIAL

## 2020-01-30 VITALS
HEART RATE: 91 BPM | BODY MASS INDEX: 33.21 KG/M2 | DIASTOLIC BLOOD PRESSURE: 86 MMHG | WEIGHT: 225 LBS | OXYGEN SATURATION: 98 % | SYSTOLIC BLOOD PRESSURE: 100 MMHG

## 2020-01-30 PROBLEM — L03.213 PERIORBITAL CELLULITIS OF RIGHT EYE: Status: ACTIVE | Noted: 2020-01-30

## 2020-01-30 PROCEDURE — 99213 OFFICE O/P EST LOW 20 MIN: CPT | Performed by: NURSE PRACTITIONER

## 2020-01-30 RX ORDER — SULFAMETHOXAZOLE AND TRIMETHOPRIM 800; 160 MG/1; MG/1
1 TABLET ORAL 2 TIMES DAILY
Qty: 14 TABLET | Refills: 0 | Status: SHIPPED | OUTPATIENT
Start: 2020-01-30 | End: 2020-02-06

## 2020-01-30 RX ORDER — NEOMYCIN SULFATE, POLYMYXIN B SULFATE, BACITRACIN ZINC, HYDROCORTISONE 3.5; 10000; 400; 1 MG/G; [USP'U]/G; [USP'U]/G; MG/G
OINTMENT OPHTHALMIC 2 TIMES DAILY
Qty: 3.5 G | Refills: 0 | Status: SHIPPED | OUTPATIENT
Start: 2020-01-30 | End: 2020-02-09

## 2020-01-30 ASSESSMENT — ENCOUNTER SYMPTOMS
COLOR CHANGE: 0
EYE ITCHING: 0
DIARRHEA: 0
CONSTIPATION: 0
EYE REDNESS: 1
NAUSEA: 0
EYE PAIN: 1
SORE THROAT: 0
SHORTNESS OF BREATH: 0
EYE DISCHARGE: 1
CHEST TIGHTNESS: 0
BLOOD IN STOOL: 0
SINUS PRESSURE: 0
ALLERGIC/IMMUNOLOGIC NEGATIVE: 1
BACK PAIN: 0
VOMITING: 0
EYE ITCHING: 1
WHEEZING: 0
RHINORRHEA: 0
ABDOMINAL PAIN: 0
COUGH: 0

## 2020-01-30 ASSESSMENT — PATIENT HEALTH QUESTIONNAIRE - PHQ9
SUM OF ALL RESPONSES TO PHQ QUESTIONS 1-9: 0
1. LITTLE INTEREST OR PLEASURE IN DOING THINGS: 0
SUM OF ALL RESPONSES TO PHQ9 QUESTIONS 1 & 2: 0
SUM OF ALL RESPONSES TO PHQ QUESTIONS 1-9: 0
2. FEELING DOWN, DEPRESSED OR HOPELESS: 0

## 2020-01-30 NOTE — PROGRESS NOTES
2020    Robby Zelaya (:  1980) is a 35 y.o. female, here for evaluation of the following medical concerns:    HPI  Monday fake eye lashes placed. Woke up today with periorbital swelling, foreign body sensation and pain. Notes change in vision in right eye. Review of Systems   Constitutional: Negative. Negative for chills, fatigue and fever. HENT: Negative. Eyes: Positive for pain, discharge, redness, itching and visual disturbance. Respiratory: Negative for cough, chest tightness, shortness of breath and wheezing. Cardiovascular: Negative. Negative for chest pain and palpitations. Gastrointestinal: Negative for abdominal pain, constipation, diarrhea, nausea and vomiting. Endocrine: Negative. Genitourinary: Negative. Allergic/Immunologic: Negative. Neurological: Negative. Hematological: Negative. Prior to Visit Medications    Medication Sig Taking? Authorizing Provider   hydrochlorothiazide (HYDRODIURIL) 25 MG tablet TAKE 1 TABLET BY MOUTH DAILY Yes Haven Chris MD   hydroxychloroquine (PLAQUENIL) 200 MG tablet TK 1 T PO BID Yes Historical Provider, MD   glucose monitoring kit (FREESTYLE) monitoring kit 1 kit by Does not apply route daily Yes Alen Pedersen MD   blood glucose monitor strips Test 3 times a day & as needed for symptoms of irregular blood glucose.  Yes Alen Pedersen MD   Lancets MISC 1 each by Does not apply route daily Check blood sugar 2-3 times per day Yes Alen Pedersen MD   vitamin D (CHOLECALCIFEROL) 1000 UNIT TABS tablet Take 1,000 Units by mouth daily Yes Historical Provider, MD        Allergies   Allergen Reactions    Latex Itching and Rash     Condoms only; gloves seem to be ok  purities  purities    Amoxicillin Itching     Patient has received PCN IV without reported issues    Hydrocodone Hives    Imidazole Antifungals Other (See Comments)    Lisinopril Other (See Comments)     diarrhea    Norvasc [Amlodipine Besylate] Other (See Comments)     GI upset    Vicodin [Hydrocodone-Acetaminophen] Itching and Nausea Only       Past Medical History:   Diagnosis Date    PAUL positive     under care of rheumatologist:Dr. Karel Alba    Anemia     Anemia, chronic disease     under care of specialist:Dr. Karel Alba    Arthritis, lumbar spine 2018    Back pain     Cervical cancer screening ;    Nml per pt':under care of Dr. Radha Solano hypertension 2015    Gestational hypertension     Midline low back pain 2016    Non morbid obesity 2015    Prediabetes 2019       Past Surgical History:   Procedure Laterality Date     SECTION      CHOLECYSTECTOMY      DILATION AND CURETTAGE OF UTERUS  16    FOOT SURGERY Left     Year     HYSTERECTOMY      LUMBAR SPINE SURGERY Left 2019    LEFT L5, S1 TRANSFORAMINAL EPIDURAL STEROID INJECTIONS WITH FLUOROSCOPY performed by Gregg Ramirez MD at [address] [place] [address] Left 2019    LEFT L5, S1 TRANSFORAMINAL EPIDURAL STEROID INJECTION WITH FLUOROSCOPY performed by Gregg Ramirez MD at [address] [place] [address] Left 2019    LEFT L5 AND S1 TRANSFORAMINAL EPIDURAL STEROID INJECTION WITH FLUOROSCOPY performed by Gregg Ramirez MD at [address] [place] [address] FLX DX W/COLLJ [address] WHEN PFRMD N/A 2018    COLONOSCOPY performed by Mary Cross MD at [place] [address] History     Socioeconomic History    Marital status: Single     Spouse name: Not on file    Number of children: Not on file    Years of education: Not on file    Highest education level: Not on file   Occupational History    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   Social Needs    Financial resource strain: Not on file    Food insecurity:     Worry: Not on file     Inability: Not on file    Transportation needs:     Medical: Not on file     Non-medical: Not on file   Tobacco Use    Smoking status: Never Smoker    Smokeless tobacco: Never Used   Substance and Sexual Activity    Alcohol use: Yes     Alcohol/week: 1.0 standard drinks     Types: 1 Glasses of wine per week     Comment: occasional    Drug use: No    Sexual activity: Yes     Partners: Male   Lifestyle    Physical activity:     Days per week: Not on file     Minutes per session: Not on file    Stress: Not on file   Relationships    Social connections:     Talks on phone: Not on file     Gets together: Not on file     Attends Shinto service: Not on file     Active member of club or organization: Not on file     Attends meetings of clubs or organizations: Not on file     Relationship status: Not on file    Intimate partner violence:     Fear of current or ex partner: Not on file     Emotionally abused: Not on file     Physically abused: Not on file     Forced sexual activity: Not on file   Other Topics Concern    Not on file   Social History Narrative    Not on file        Family History   Problem Relation Age of Onset    High Blood Pressure Mother     Diabetes Mother     Other Father    Artem Ralph Rheum Arthritis Neg Hx     Osteoarthritis Neg Hx     Asthma Neg Hx     Cancer Neg Hx     High Cholesterol Neg Hx     Hypertension Neg Hx     Migraines Neg Hx     Rashes/Skin Problems Neg Hx     Seizures Neg Hx     Stroke Neg Hx     Thyroid Disease Neg Hx        Vitals:    01/30/20 1525   BP: 100/86   Pulse: 91   SpO2: 98%   Weight: 225 lb (102.1 kg)     Estimated body mass index is 33.21 kg/m² as calculated from the following:    Height as of 1/21/20: 5' 9.02\" (1.753 m). Weight as of this encounter: 225 lb (102.1 kg). Physical Exam  Vitals signs and nursing note reviewed. Constitutional:       Appearance: Normal appearance.    HENT:      Right Ear: Tympanic membrane and ear canal normal.      Left Ear: Tympanic membrane and ear canal normal.   Eyes:      General: Lids are normal. Lids are everted, no foreign bodies appreciated. Right eye: Discharge present. No foreign body or hordeolum. Extraocular Movements: Extraocular movements intact. Right eye: Normal extraocular motion and no nystagmus. Left eye: Normal extraocular motion and no nystagmus. Conjunctiva/sclera:      Right eye: Right conjunctiva is injected. Neck:      Musculoskeletal: Normal range of motion. Cardiovascular:      Rate and Rhythm: Normal rate and regular rhythm. Pulmonary:      Effort: Pulmonary effort is normal.      Breath sounds: Normal breath sounds. Skin:     General: Skin is warm and dry. Capillary Refill: Capillary refill takes less than 2 seconds. Neurological:      Mental Status: She is alert. Psychiatric:         Mood and Affect: Mood normal.         Thought Content: Thought content normal.         ASSESSMENT/PLAN:  Right eye bacterial conjuctivitis:  Start Bactrim DS today. Use antibiotic eye drops as prescribed. Discontinue use of fake eyelashes or makeup applied to eyes. An  electronic signature was used to authenticate this note.     Rosy Syed on 1/30/2020 at 3:34 PM

## 2020-01-30 NOTE — PROGRESS NOTES
Subjective:      Patient ID: Noel Mahajan is a 35 y.o. female. Chief Complaint   Patient presents with    Conjunctivitis       HPI  lory is in the office with right eye itching and periorbital swelling and redness to her eye. She had eyelashes placed on Monday, and started with the swelling and itching with drainage in the past few days. She has no vision changes. She has not done anything for this other than eye wash at work and removing the eyelashes. Review of Systems   Constitutional: Negative for chills, fatigue and fever. HENT: Negative for congestion, ear pain, postnasal drip, rhinorrhea, sinus pressure, sneezing and sore throat. Eyes: Positive for redness. Negative for itching. Respiratory: Negative for cough, chest tightness, shortness of breath and wheezing. Cardiovascular: Negative for chest pain and palpitations. Gastrointestinal: Negative for abdominal pain, blood in stool, constipation, diarrhea, nausea and vomiting. Endocrine: Negative for cold intolerance and heat intolerance. Genitourinary: Negative for difficulty urinating, dysuria, flank pain, frequency, hematuria and urgency. Musculoskeletal: Negative for arthralgias, back pain, joint swelling and myalgias. Skin: Negative for color change, pallor, rash and wound. Allergic/Immunologic: Negative for environmental allergies and food allergies. Neurological: Negative for dizziness, seizures, syncope, weakness, light-headedness, numbness and headaches. Hematological: Negative for adenopathy. Does not bruise/bleed easily. Psychiatric/Behavioral: Negative for confusion, sleep disturbance and suicidal ideas. The patient is not nervous/anxious and is not hyperactive. Objective:   Physical Exam  Constitutional:       Appearance: She is well-developed. HENT:      Head: Normocephalic.       Right Ear: External ear normal.      Left Ear: External ear normal.   Eyes:      Conjunctiva/sclera: Conjunctivae normal.

## 2020-01-31 ENCOUNTER — TELEPHONE (OUTPATIENT)
Dept: INTERNAL MEDICINE CLINIC | Age: 40
End: 2020-01-31

## 2020-01-31 RX ORDER — ERYTHROMYCIN 5 MG/G
OINTMENT OPHTHALMIC
Qty: 3.5 G | Refills: 0 | Status: SHIPPED | OUTPATIENT
Start: 2020-01-31 | End: 2020-02-10

## 2020-01-31 NOTE — TELEPHONE ENCOUNTER
Fiorella calling because the ophthalmic ointment prescribed yesterday is on back order, can you please call in a different med. ..  [address] [facility], phone [phone]

## 2020-06-13 RX ORDER — HYDROCHLOROTHIAZIDE 25 MG/1
TABLET ORAL
Qty: 30 TABLET | Refills: 2 | OUTPATIENT
Start: 2020-06-13

## 2020-06-22 ENCOUNTER — TELEMEDICINE (OUTPATIENT)
Dept: FAMILY MEDICINE CLINIC | Age: 40
End: 2020-06-22
Payer: COMMERCIAL

## 2020-06-22 PROCEDURE — 99214 OFFICE O/P EST MOD 30 MIN: CPT | Performed by: FAMILY MEDICINE

## 2020-06-22 RX ORDER — NAPROXEN 500 MG/1
500 TABLET ORAL 2 TIMES DAILY WITH MEALS
Qty: 30 TABLET | Refills: 0 | Status: SHIPPED | OUTPATIENT
Start: 2020-06-22 | End: 2020-07-23

## 2020-06-22 RX ORDER — NAPROXEN 500 MG/1
500 TABLET ORAL 2 TIMES DAILY WITH MEALS
Qty: 30 TABLET | Refills: 0 | Status: SHIPPED | OUTPATIENT
Start: 2020-06-22 | End: 2020-06-22 | Stop reason: SDUPTHER

## 2020-06-22 RX ORDER — OXYBUTYNIN CHLORIDE 10 MG/1
10 TABLET, EXTENDED RELEASE ORAL DAILY
COMMUNITY
Start: 2020-05-27

## 2020-06-22 ASSESSMENT — ENCOUNTER SYMPTOMS
CHOKING: 0
ABDOMINAL PAIN: 0
APNEA: 0
VOMITING: 0
RECTAL PAIN: 0
EYES NEGATIVE: 1
CONSTIPATION: 0
STRIDOR: 0
SHORTNESS OF BREATH: 0
COUGH: 0
ANAL BLEEDING: 0
WHEEZING: 0
CHEST TIGHTNESS: 0
ABDOMINAL DISTENTION: 0
DIARRHEA: 0
BLOOD IN STOOL: 0
NAUSEA: 0

## 2020-06-22 NOTE — PROGRESS NOTES
and vomiting. Endocrine: Negative for cold intolerance, heat intolerance, polydipsia, polyphagia and polyuria. Genitourinary: Negative for difficulty urinating. Musculoskeletal: Positive for arthralgias. Skin: Negative for rash and wound. Neurological: Negative. Negative for dizziness and light-headedness. Hematological: Negative for adenopathy. Psychiatric/Behavioral: Negative for sleep disturbance. Objective:RR=16. Physical Exam  Constitutional:       Appearance: She is well-developed. She is not toxic-appearing. Comments: Note:exam was conducted with pt' either self-palpating or visually indicating via their device camera. HENT:      Nose:      Comments: Nml external ear & nose exams. Mouth/Throat:      Mouth: Mucous membranes are moist.      Pharynx: Oropharynx is clear. Uvula midline. Eyes:      General: No scleral icterus. Conjunctiva/sclera: Conjunctivae normal.   Neck:      Comments: No neck LAD per self-palpation. Pulmonary:      Effort: Pulmonary effort is normal.      Comments: No audible wheezing/cough/sob. Abdominal:      Comments: Abdo exam:S,Non-tender per pt' self-palpation. Musculoskeletal:      Comments: Pt' locates foot-heel pain to plantar aspect. No TTP during VV self-palpation. Nml external foot-ankle exams. No swelling BLE noted. Skin:     Coloration: Skin is not cyanotic. Findings: No rash. Neurological:      Mental Status: She is alert. Psychiatric:         Mood and Affect: Mood normal.         Behavior: Behavior is cooperative. Comments: Good eye contact. Assessment:       Diagnosis Orders   1. Pain of left heel  VSS per limited vitals obtainable via virtual visit(VV)/well appearing. ?plantar fasciitis vs other etiology. Rest/NSAIDs/ice-heat pad. Possible med side effects reviewed. Pt' wishes to proceed w/med. Xray eval & DPM consult if not improving.   Home plantar fasciitis exercises per

## 2020-06-24 ENCOUNTER — TELEPHONE (OUTPATIENT)
Dept: FAMILY MEDICINE CLINIC | Age: 40
End: 2020-06-24

## 2020-06-24 NOTE — TELEPHONE ENCOUNTER
Pt states that she saw the blood specialist and she received a bill for a virtual visit for $98. Pt requesting to come into the office for an in person visit.

## 2020-06-24 NOTE — TELEPHONE ENCOUNTER
This can be rechecked  & reviewed during her next f/u appt along with all her recent labs. F/u within 3weeks via VV.

## 2020-06-24 NOTE — TELEPHONE ENCOUNTER
Patient is calling stating that she got a message on [employer] last night about the remainder of her blood work and one part said   Platelets CANCELED  G39L1/XE  06/22/2020 10:23 AM [facility]   Unable to perform accurate platelet count due to platelet clumps. Platelets appear adequate on slide.      Patient would like to know what she should do

## 2020-07-02 ENCOUNTER — TELEPHONE (OUTPATIENT)
Dept: FAMILY MEDICINE CLINIC | Age: 40
End: 2020-07-02

## 2020-07-02 NOTE — TELEPHONE ENCOUNTER
[phone] (ALESSANDRA)  OV: 6/22/2020    Patient was charged $150 for a VV MyChart visit and would like to know what visit can occurr without such high of a bill? Patient woukd like an in person visit since the Michelle Goode visits are not covered by insurance. Please advise.

## 2020-07-23 ENCOUNTER — OFFICE VISIT (OUTPATIENT)
Dept: FAMILY MEDICINE CLINIC | Age: 40
End: 2020-07-23
Payer: COMMERCIAL

## 2020-07-23 VITALS
RESPIRATION RATE: 16 BRPM | HEIGHT: 69 IN | WEIGHT: 223 LBS | SYSTOLIC BLOOD PRESSURE: 131 MMHG | DIASTOLIC BLOOD PRESSURE: 82 MMHG | BODY MASS INDEX: 33.03 KG/M2 | HEART RATE: 88 BPM | TEMPERATURE: 97.3 F | OXYGEN SATURATION: 99 %

## 2020-07-23 PROCEDURE — 99214 OFFICE O/P EST MOD 30 MIN: CPT | Performed by: FAMILY MEDICINE

## 2020-07-23 RX ORDER — NAPROXEN 500 MG/1
500 TABLET ORAL 2 TIMES DAILY WITH MEALS
Qty: 30 TABLET | Refills: 0 | Status: SHIPPED | OUTPATIENT
Start: 2020-07-23

## 2020-07-23 ASSESSMENT — ENCOUNTER SYMPTOMS
SHORTNESS OF BREATH: 0
DIARRHEA: 0
ABDOMINAL DISTENTION: 0
RECTAL PAIN: 0
COUGH: 0
STRIDOR: 0
VOMITING: 0
WHEEZING: 0
CHEST TIGHTNESS: 0
CHOKING: 0
APNEA: 0
BLOOD IN STOOL: 0
ANAL BLEEDING: 0
ABDOMINAL PAIN: 0
NAUSEA: 0
CONSTIPATION: 0

## 2020-07-23 NOTE — PROGRESS NOTES
Subjective:      Patient ID: Dorothy Koch is a 35 y.o. female. HPI   Results for Ingris Rizvi (MRN [id number]) as of 7/23/2020 08:05   Ref.  Range 6/22/2020 10:23   Sodium Latest Ref Range: 134 - 144 mmol/L 141   Potassium Latest Ref Range: 3.5 - 5.2 mmol/L 4.0   Chloride Latest Ref Range: 96 - 106 mmol/L 106   CO2 Latest Ref Range: 20 - 29 mmol/L 22   BUN Latest Ref Range: 6 - 24 mg/dL 10   Creatinine Latest Ref Range: 0.57 - 1.00 mg/dL 0.81   BUN/Creatinine Ratio Latest Ref Range: 9 - 23  12   GFR Non- Latest Ref Range: >59 mL/min/1.73 91   GFR  Latest Ref Range: >59 mL/min/1.73 105   Glucose Latest Ref Range: 65 - 99 mg/dL 84   Calcium Latest Ref Range: 8.7 - 10.2 mg/dL 9.3   Total Protein Latest Ref Range: 6.0 - 8.5 g/dL 6.7   Albumin Latest Ref Range: 3.8 - 4.8 g/dL 4.2   Globulin Latest Ref Range: 1.5 - 4.5 g/dL 2.5   Albumin/Globulin Ratio Latest Ref Range: 1.2 - 2.2  1.7   Alk Phos Latest Ref Range: 39 - 117 IU/L 70   ALT Latest Ref Range: 0 - 40 IU/L 42 (H)   AST Latest Ref Range: 0 - 40 IU/L 30   Bilirubin Latest Ref Range: 0.0 - 1.2 mg/dL 0.3   Hemoglobin A1C Latest Ref Range: 4.8 - 5.6 % 5.6   TSH Latest Ref Range: 0.450 - 4.500 uIU/mL 0.844   WBC Latest Ref Range: 3.4 - 10.8 x10E3/uL 4.3   RBC Latest Ref Range: 3.77 - 5.28 x10E6/uL 4.61   Hemoglobin Quant Latest Ref Range: 11.1 - 15.9 g/dL 12.6   Hematocrit Latest Ref Range: 34.0 - 46.6 % 39.2   MCV Latest Ref Range: 79 - 97 fL 85   MCH Latest Ref Range: 26.6 - 33.0 pg 27.3   MCHC Latest Ref Range: 31.5 - 35.7 g/dL 32.1   RDW Latest Ref Range: 11.7 - 15.4 % 13.3   Platelet Count Latest Units: x10E3/uL CANCELED   Lymphocyte % Latest Ref Range: Not Estab. % 46   Monocytes % Latest Ref Range: Not Estab. % 8   Eosinophils % Latest Ref Range: Not Estab. % 2   Basophils % Latest Ref Range: Not Estab. % 1   Neutrophils Absolute Latest Ref Range: 1.4 - 7.0 x10E3/uL 1.8   Lymphocytes Absolute Latest Ref Range: 0.7 - 3.1 x10E3/uL 2.0   Monocytes Absolute Latest Ref Range: 0.1 - 0.9 x10E3/uL 0.4   Eosinophils Absolute Latest Ref Range: 0.0 - 0.4 x10E3/uL 0.1   Basophils Absolute Latest Ref Range: 0.0 - 0.2 x10E3/uL 0.0   Segs Relative Latest Ref Range: Not Estab. % 43   Immature Grans (Abs) Latest Ref Range: 0.0 - 0.1 x10E3/uL 0.0   Immature Granulocytes Latest Ref Range: Not Estab. % 0   Morphology Unknown Note:   Erythrocytes, Nucleated/100 phylicia Unknown CANCELED   Abs, Immature Cells Unknown CANCELED       Abnml ALT:mild elevation:see above:new problem. Etoh use: occasional.  No associated factors. No improving or worsening factors identified. Denies tylenol/etoh abuse. Denies abdo pain/jaudice/urine or stool color changes/hepatitis exposure/i.v drug abuse. F/u to:mild to moderate bilateral plantar heel/foot sharp-throbbing type pain >3months intermittently. Taking naproxen w/o side effects. Now:is improving with naproxen & TA stretching. DPM appt is next Monday. Foot xrays pending. Preceding injury:none recalled. Associated w/nothing. Worsened by walking;arising from sitting position. Is able to bear weight & walk w/o difficulty. Improves w/naproxen. Hx of gout:no. Denies BLE jsynyfdz-megvsiiso-kcayjjvsbrx/skin lesions or ulcers/fever/chills/gait abnormality/skin lesions or open wounds    F/u to:mild fatigue x5-6weeks. Is improving since improving her diet with no sodas now. Sleeps approx 7-8hrs. Associated w/mild-moderate snoring >3months. See labs above. Worsened(aggravated) by nothing. Improves by resting. Self tx:none. No fhx thyroid dz. Denies heat-cold intolerance/skin changes/dysphagia/edema/heart palpitations/excessive sweating/vaginal bleeding. Vit D def:Taking vit d(1000iu qd) w/o side effects. Associated w/nothing else. Improves w/med. Worsened by nothing reported. Denies bone pain/spontaneous fx.     Allergies   Allergen Reactions    Latex Itching and Rash     Condoms only; gloves seem to be ok  purities  purities    Amoxicillin Itching     Patient has received PCN IV without reported issues    Hydrocodone Hives    Imidazole Antifungals Other (See Comments)    Lisinopril Other (See Comments)     diarrhea    Norvasc [Amlodipine Besylate] Other (See Comments)     GI upset    Vicodin [Hydrocodone-Acetaminophen] Itching and Nausea Only       Current Outpatient Medications on File Prior to Visit   Medication Sig Dispense Refill    oxybutynin (DITROPAN-XL) 10 MG extended release tablet Take 10 mg by mouth daily      naproxen (NAPROSYN) 500 MG tablet Take 1 tablet by mouth 2 times daily (with meals) 30 tablet 0    hydrochlorothiazide (HYDRODIURIL) 25 MG tablet TAKE 1 TABLET BY MOUTH DAILY 30 tablet 2    vitamin D (CHOLECALCIFEROL) 1000 UNIT TABS tablet Take 1,000 Units by mouth daily       No current facility-administered medications on file prior to visit. Past Medical History:   Diagnosis Date    PAUL positive     under care of rheumatologist:Dr. Felix Keith    Anemia     Anemia, chronic disease     under care of specialist:Dr. Felix Keith    Arthritis, lumbar spine 12/18/2018    Back pain     Cervical cancer screening 2014;2015    Nml per pt':under care of Dr. Adalgisa Simon hypertension 11/2/2015    Gestational hypertension     Midline low back pain 4/6/2016    Non morbid obesity 11/16/2015    Prediabetes 2/1/2019    Vitamin D deficiency              Social History     Tobacco Use    Smoking status: Never Smoker    Smokeless tobacco: Never Used   Substance Use Topics    Alcohol use: Yes     Alcohol/week: 1.0 standard drinks     Types: 1 Glasses of wine per week     Comment: occasional    Drug use: No     Social History     Substance and Sexual Activity   Drug Use No           Review of Systems   Constitutional: Negative for activity change, appetite change, chills, diaphoresis, fatigue, fever and unexpected weight change. Eyes: Negative for visual disturbance. swelling, crepitus, deformity or laceration. Left foot: Normal range of motion and normal capillary refill. No tenderness, bony tenderness, swelling, crepitus, deformity or laceration. Lymphadenopathy:      Cervical: No cervical adenopathy. Comments: No supraclavicular LAD. Skin:     General: Skin is warm and dry. Capillary Refill: Capillary refill takes less than 2 seconds. Coloration: Skin is not cyanotic. Findings: No rash. Comments: Good skin turgor. Neurological:      Mental Status: She is alert and oriented to person, place, and time. Psychiatric:         Mood and Affect: Mood normal.         Behavior: Behavior is cooperative. Comments: Good eye contact. Assessment:       Diagnosis Orders   1. Elevated ALT measurement    VSS/well appearing. Recheck lab:avoid etoh/tylenol. ALT   2. Essential hypertension  Stable. Low salt diet advised. 3. Fatigue  Slight improvement:?sleep apnea in light of snoring. Consult sleep specialist for sleep study. Faviola Boateng MD, Sleep Medicine, [facility]   4. Snoring  See note#3. Faviola Boateng MD, Sleep Medicine, [facility]   5. Pain of left heel  No significant change in heel pain:keep DPM appt. ?plantar fasciitis:continue TA stretches/otc heel lift. Naproxen prn. Xrays:will await for DPM to naproxen. (NAPROSYN) 500 MG tablet   6. Pain of right heel  naproxen (NAPROSYN) 500 MG tablet   7. Bilateral foot pain  See note#5.  naproxen (NAPROSYN) 500 MG tablet   8. Prediabetes  Stable. A1c in 6mos. 9. Breast cancer screening  Pt' has mammogram order & will schedule soon. 10. Vitamin D deficiency  Vitamin D 25 Hydroxy  Check lab. 11. Abnormal CBC  Recheck lab since clumped plt. PLATELET COUNT   12. Non morbid obesity  Diet & exercise regime reviewed. Plan:      Pt' left office in good condition.   Call or return to clinic prn if these symptoms worsen or fail to improve as anticipated.           Danita Carranza MD

## 2020-07-24 RX ORDER — HYDROCHLOROTHIAZIDE 25 MG/1
TABLET ORAL
Qty: 30 TABLET | Refills: 2 | Status: SHIPPED | OUTPATIENT
Start: 2020-07-24 | End: 2020-09-28

## 2020-07-28 ENCOUNTER — TELEPHONE (OUTPATIENT)
Dept: FAMILY MEDICINE CLINIC | Age: 40
End: 2020-07-28

## 2020-07-28 NOTE — TELEPHONE ENCOUNTER
[phone] (ALESSANDRA)  OV: 7/23/2020    Patient had blood work done at [facility] and wanted to know if the information had been faxed over to the office. In addition, patient called to provide information for a reference; Referral information:   Dr. Pedro Cota, Spine Specialist  [facility]    Please advise.

## 2020-07-30 NOTE — TELEPHONE ENCOUNTER
Patient is calling back needing the referral to be sent to Referral information:   Dr. Paloma August, Spine Specialist  [facility]      Please advise patient

## 2020-09-01 ENCOUNTER — TELEPHONE (OUTPATIENT)
Dept: FAMILY MEDICINE CLINIC | Age: 40
End: 2020-09-01

## 2020-09-01 NOTE — TELEPHONE ENCOUNTER
Per Dr. Jennifer Palacio: refer pt to Radiology/medical records dept to request for this.   Pt informed and expressed understanding  Close Encounter

## 2020-09-01 NOTE — TELEPHONE ENCOUNTER
----- Message from Emily Michelle sent at 9/1/2020  2:22 PM EDT -----  Subject: Message to Provider    QUESTIONS  Information for Provider? patient is calling with a request. Would like to    her report and disc from her MRI from 2016 & 2018. Requesting call   back please.   ---------------------------------------------------------------------------  --------------  CALL BACK INFO  What is the best way for the office to contact you? OK to leave message on   voicemail  Preferred Call Back Phone Number? [phone]  ---------------------------------------------------------------------------  --------------  SCRIPT ANSWERS  Relationship to Patient?  Self

## 2020-09-28 ENCOUNTER — TELEPHONE (OUTPATIENT)
Dept: FAMILY MEDICINE CLINIC | Age: 40
End: 2020-09-28

## 2020-09-28 RX ORDER — HYDROCHLOROTHIAZIDE 25 MG/1
TABLET ORAL
Qty: 90 TABLET | Refills: 0 | Status: SHIPPED | OUTPATIENT
Start: 2020-09-28 | End: 2020-10-27

## 2020-09-28 NOTE — TELEPHONE ENCOUNTER
----- Message from Rama Alatorre sent at 9/28/2020  9:22 AM EDT -----  Subject: Message to Provider    QUESTIONS  Information for Provider? Pt is requesting an call back to discuss some   Rx.  ---------------------------------------------------------------------------  --------------  CALL BACK INFO  What is the best way for the office to contact you? OK to leave message on   voicemail  Preferred Call Back Phone Number? [phone]  ---------------------------------------------------------------------------  --------------  SCRIPT ANSWERS  Relationship to Patient?  Self

## 2020-09-28 NOTE — TELEPHONE ENCOUNTER
Called pt back. Pt requesting for information from sleep specialist.   Sukumar [address], [address] Marixa Jhaveri MD   [address]   3rd [facility], [address] [place] [address]   phone [phone] fax [phone]   Information has been given. Pt scheduled IN PERSON APPT.

## 2020-10-15 ENCOUNTER — HOSPITAL ENCOUNTER (OUTPATIENT)
Age: 40
Discharge: HOME | End: 2020-10-15
Payer: COMMERCIAL

## 2020-10-15 LAB
A/G RATIO: 1.3 (ref 1.1–2.2)
ALBUMIN SERPL-MCNC: 4.1 G/DL (ref 3.4–5)
ALP BLD-CCNC: 62 U/L (ref 40–129)
ALT SERPL-CCNC: 17 U/L (ref 10–40)
ANION GAP SERPL CALCULATED.3IONS-SCNC: 14 MMOL/L (ref 3–16)
AST SERPL-CCNC: 21 U/L (ref 15–37)
BASOPHILS ABSOLUTE: 0 K/UL (ref 0–0.2)
BASOPHILS RELATIVE PERCENT: 0.7 %
BILIRUB SERPL-MCNC: 0.3 MG/DL (ref 0–1)
BUN BLDV-MCNC: 16 MG/DL (ref 7–20)
CALCIUM SERPL-MCNC: 9.4 MG/DL (ref 8.3–10.6)
CHLORIDE BLD-SCNC: 100 MMOL/L (ref 99–110)
CO2: 23 MMOL/L (ref 21–32)
CREAT SERPL-MCNC: 0.7 MG/DL (ref 0.6–1.1)
EOSINOPHILS ABSOLUTE: 0.1 K/UL (ref 0–0.6)
EOSINOPHILS RELATIVE PERCENT: 1.6 %
FOLATE: 5.9 NG/ML (ref 4.78–24.2)
GFR AFRICAN AMERICAN: >60
GFR NON-AFRICAN AMERICAN: >60
GLOBULIN: 3.1 G/DL
GLUCOSE BLD-MCNC: 83 MG/DL (ref 70–99)
HCT VFR BLD CALC: 40.7 % (ref 36–48)
HEMOGLOBIN: 13.4 G/DL (ref 12–16)
LYMPHOCYTES ABSOLUTE: 3.1 K/UL (ref 1–5.1)
LYMPHOCYTES RELATIVE PERCENT: 45.3 %
MCH RBC QN AUTO: 27.7 PG (ref 26–34)
MCHC RBC AUTO-ENTMCNC: 33 G/DL (ref 31–36)
MCV RBC AUTO: 84.1 FL (ref 80–100)
MONOCYTES ABSOLUTE: 0.5 K/UL (ref 0–1.3)
MONOCYTES RELATIVE PERCENT: 6.9 %
NEUTROPHILS ABSOLUTE: 3.1 K/UL (ref 1.7–7.7)
NEUTROPHILS RELATIVE PERCENT: 45.5 %
PDW BLD-RTO: 13.3 % (ref 12.4–15.4)
PLATELET # BLD: 201 K/UL (ref 135–450)
PMV BLD AUTO: 10 FL (ref 5–10.5)
POTASSIUM SERPL-SCNC: 4 MMOL/L (ref 3.5–5.1)
RBC # BLD: 4.83 M/UL (ref 4–5.2)
SODIUM BLD-SCNC: 137 MMOL/L (ref 136–145)
T3 TOTAL: 1.27 NG/ML (ref 0.8–2)
T4 FREE: 1.3 NG/DL (ref 0.9–1.8)
T4 TOTAL: 9.1 UG/DL (ref 4.5–10.9)
TOTAL PROTEIN: 7.2 G/DL (ref 6.4–8.2)
TSH SERPL DL<=0.05 MIU/L-ACNC: 0.87 UIU/ML (ref 0.27–4.2)
VITAMIN B-12: 413 PG/ML (ref 211–911)
WBC # BLD: 6.8 K/UL (ref 4–11)

## 2020-10-15 PROCEDURE — 84439 ASSAY OF FREE THYROXINE: CPT

## 2020-10-15 PROCEDURE — 82607 VITAMIN B-12: CPT

## 2020-10-15 PROCEDURE — 36415 COLL VENOUS BLD VENIPUNCTURE: CPT

## 2020-10-15 PROCEDURE — 80053 COMPREHEN METABOLIC PANEL: CPT

## 2020-10-15 PROCEDURE — 82746 ASSAY OF FOLIC ACID SERUM: CPT

## 2020-10-15 PROCEDURE — 85025 COMPLETE CBC W/AUTO DIFF WBC: CPT

## 2020-10-15 PROCEDURE — 83036 HEMOGLOBIN GLYCOSYLATED A1C: CPT

## 2020-10-15 PROCEDURE — 84480 ASSAY TRIIODOTHYRONINE (T3): CPT

## 2020-10-15 PROCEDURE — 84207 ASSAY OF VITAMIN B-6: CPT

## 2020-10-15 PROCEDURE — 84443 ASSAY THYROID STIM HORMONE: CPT

## 2020-10-16 LAB
ESTIMATED AVERAGE GLUCOSE: 119.8 MG/DL
HBA1C MFR BLD: 5.8 %

## 2020-10-19 LAB — VITAMIN B6: 37.3 NMOL/L (ref 20–125)

## 2020-10-20 ENCOUNTER — OFFICE VISIT (OUTPATIENT)
Dept: FAMILY MEDICINE CLINIC | Age: 40
End: 2020-10-20
Payer: COMMERCIAL

## 2020-10-20 VITALS
HEIGHT: 69 IN | OXYGEN SATURATION: 98 % | RESPIRATION RATE: 16 BRPM | BODY MASS INDEX: 35.25 KG/M2 | HEART RATE: 78 BPM | DIASTOLIC BLOOD PRESSURE: 76 MMHG | TEMPERATURE: 97.2 F | SYSTOLIC BLOOD PRESSURE: 115 MMHG | WEIGHT: 238 LBS

## 2020-10-20 PROCEDURE — 99396 PREV VISIT EST AGE 40-64: CPT | Performed by: FAMILY MEDICINE

## 2020-10-20 ASSESSMENT — ENCOUNTER SYMPTOMS
BLOOD IN STOOL: 0
EYE REDNESS: 0
SINUS PRESSURE: 0
WHEEZING: 0
COUGH: 0
SINUS PAIN: 0
ABDOMINAL PAIN: 0
SHORTNESS OF BREATH: 0
VOICE CHANGE: 0
EYE ITCHING: 0
CHEST TIGHTNESS: 0
TROUBLE SWALLOWING: 0
SORE THROAT: 0
PHOTOPHOBIA: 0
CHOKING: 0
ANAL BLEEDING: 0
NAUSEA: 0
DIARRHEA: 0
ABDOMINAL DISTENTION: 0
EYE DISCHARGE: 0
VOMITING: 0
RECTAL PAIN: 0
BACK PAIN: 0
COLOR CHANGE: 0
EYE PAIN: 0
APNEA: 0
RHINORRHEA: 0
FACIAL SWELLING: 0
CONSTIPATION: 0
STRIDOR: 0

## 2020-10-20 NOTE — PROGRESS NOTES
Subjective:      Patient ID: Don Sesay is a 38 y.o. female. HPI     Presenting for:Routine Physical exam  Pap Smear:s/p hysterectomy. No cervical cancer. Mammogram:6/13/19. Has imaging order for this year. Performs self-breast exams:  Eye check:<12mos  Dental check:<12mos  Lipid check:wishes to proceed. Colonoscopy:N/A  Immunizations:per pt' last tetanus vaccine <10yrs ago received. Smoker:no  Exercise:walking daily. Foot pain:sees DPM.  Denies any other/new concerns. Allergies   Allergen Reactions    Latex Itching and Rash     Condoms only; gloves seem to be ok  purities  purities    Amoxicillin Itching     Patient has received PCN IV without reported issues    Hydrocodone Hives    Imidazole Antifungals Other (See Comments)    Lisinopril Other (See Comments)     diarrhea    Norvasc [Amlodipine Besylate] Other (See Comments)     GI upset    Vicodin [Hydrocodone-Acetaminophen] Itching and Nausea Only       Current Outpatient Medications on File Prior to Visit   Medication Sig Dispense Refill    hydroCHLOROthiazide (HYDRODIURIL) 25 MG tablet TAKE 1 TABLET BY MOUTH DAILY 90 tablet 0    naproxen (NAPROSYN) 500 MG tablet Take 1 tablet by mouth 2 times daily (with meals) 30 tablet 0    oxybutynin (DITROPAN-XL) 10 MG extended release tablet Take 10 mg by mouth daily      vitamin D (CHOLECALCIFEROL) 1000 UNIT TABS tablet Take 1,000 Units by mouth daily       No current facility-administered medications on file prior to visit.         Past Medical History:   Diagnosis Date    PAUL positive     under care of rheumatologist:Dr. Donnie Carpio    Anemia     Anemia, chronic disease     under care of specialist:Dr. Donnie Carpio    Arthritis, lumbar spine 12/18/2018    Back pain     Cervical cancer screening 2014;2015    Nml per pt':under care of Dr. Jose Alberto Baeza hypertension 11/2/2015    Gestational hypertension     Midline low back pain 4/6/2016    Non morbid obesity 11/16/2015    Prediabetes 2019    Vitamin D deficiency        Past Surgical History:   Procedure Laterality Date     SECTION      CHOLECYSTECTOMY      DILATION AND CURETTAGE OF UTERUS  16    FOOT SURGERY Left     Year     HYSTERECTOMY      LUMBAR SPINE SURGERY Left 2019    LEFT L5, S1 TRANSFORAMINAL EPIDURAL STEROID INJECTIONS WITH FLUOROSCOPY performed by Gloria Mendoza MD at [address] [place] [address] Left 2019    LEFT L5, S1 TRANSFORAMINAL EPIDURAL STEROID INJECTION WITH FLUOROSCOPY performed by Gloria Mendoza MD at [address] [place] [address] Left 2019    LEFT L5 AND S1 TRANSFORAMINAL EPIDURAL STEROID INJECTION WITH FLUOROSCOPY performed by Gloria Mendoza MD at [address] [place] [address] FLX DX W/COLLMAAME [place] 1978 PFRMD N/A 2018    COLONOSCOPY performed by Yanna Cruz MD at [place] [address] History     Tobacco Use    Smoking status: Never Smoker    Smokeless tobacco: Never Used   Substance Use Topics    Alcohol use: Yes     Alcohol/week: 1.0 standard drinks     Types: 1 Glasses of wine per week     Comment: occasional    Drug use: No     Social History     Substance and Sexual Activity   Drug Use No       Family History   Problem Relation Age of Onset    High Blood Pressure Mother     Diabetes Mother     Other Father     Rheum Arthritis Neg Hx     Osteoarthritis Neg Hx     Asthma Neg Hx     Cancer Neg Hx     High Cholesterol Neg Hx     Hypertension Neg Hx     Migraines Neg Hx     Rashes/Skin Problems Neg Hx     Seizures Neg Hx     Stroke Neg Hx     Thyroid Disease Neg Hx              Review of Systems   Constitutional: Negative for activity change, appetite change, chills, diaphoresis, fatigue, fever and unexpected weight change.         Negative for:Difficulty sleeping/night sweats/unexplained weight loss or gain/malaise   HENT: Negative for congestion, dental problem, drooling, ear discharge, ear pain, facial swelling, hearing loss, mouth sores, nosebleeds, postnasal drip, rhinorrhea, sinus pressure, sinus pain, sneezing, sore throat, tinnitus, trouble swallowing and voice change. Negative for:Dizziness/vertigo   Eyes: Negative for photophobia, pain, discharge, redness, itching and visual disturbance. Respiratory: Negative for apnea, cough, choking, chest tightness, shortness of breath, wheezing and stridor. Negative for:Hemoptysis/hoarseness/pain on inspiration. Breasts:tenderness/masses/nipple discharge/skin changes. Cardiovascular: Negative for chest pain, palpitations and leg swelling. Negative for:Syncope/presyncope/lower extremity edema/claudication/PND/irregular heart beats   Gastrointestinal: Negative for abdominal distention, abdominal pain, anal bleeding, blood in stool, constipation, diarrhea, nausea, rectal pain and vomiting. Negative for:Melena/bloating/dysphagia/reflux/loss of appetite   Endocrine: Negative for cold intolerance, heat intolerance, polydipsia, polyphagia and polyuria. Genitourinary: Negative for decreased urine volume, difficulty urinating, dyspareunia, dysuria, enuresis, flank pain, frequency, genital sores, hematuria, menstrual problem, pelvic pain, urgency, vaginal bleeding, vaginal discharge and vaginal pain. Musculoskeletal: Positive for arthralgias. Negative for back pain, gait problem, joint swelling, myalgias, neck pain and neck stiffness. Skin: Negative for color change, pallor, rash and wound. Neurological: Negative for dizziness, tremors, seizures, syncope, facial asymmetry, speech difficulty, weakness, light-headedness, numbness and headaches. Negative for:Visual disturbance/muscular weakness/paralysis/memory problems. Hematological: Negative for adenopathy. Does not bruise/bleed easily.         Negative for:Lymph node tenderness   Psychiatric/Behavioral: Negative for agitation, behavioral problems, confusion, decreased

## 2020-10-23 ENCOUNTER — HOSPITAL ENCOUNTER (OUTPATIENT)
Dept: WOMENS IMAGING | Age: 40
Discharge: HOME | End: 2020-10-23
Payer: COMMERCIAL

## 2020-10-23 DIAGNOSIS — Z13.220 LIPID SCREENING: ICD-10-CM

## 2020-10-23 LAB
CHOLESTEROL, TOTAL: 120 MG/DL (ref 0–199)
HDLC SERPL-MCNC: 62 MG/DL (ref 40–60)
LDL CHOLESTEROL CALCULATED: 47 MG/DL
TRIGL SERPL-MCNC: 57 MG/DL (ref 0–150)
VLDLC SERPL CALC-MCNC: 11 MG/DL

## 2020-10-23 PROCEDURE — 36415 COLL VENOUS BLD VENIPUNCTURE: CPT | Performed by: FAMILY MEDICINE

## 2020-10-23 PROCEDURE — 77063 BREAST TOMOSYNTHESIS BI: CPT

## 2020-10-26 ENCOUNTER — TELEPHONE (OUTPATIENT)
Dept: FAMILY MEDICINE CLINIC | Age: 40
End: 2020-10-26

## 2020-10-26 NOTE — TELEPHONE ENCOUNTER
Spoke with pt. Pt states that she wanted to let Dr. Shavon Hendrix know that Dr. Dylan Farfan informed her that she was prediabetic and dx with diabetic neuropathy. Dr. Shavon Hendrix aware and informed to follow up in approx. 3 months. Pt informed and voiced understanding.

## 2020-10-26 NOTE — TELEPHONE ENCOUNTER
----- Message from Jaren Mathew sent at 10/26/2020 11:29 AM EDT -----  Subject: Message to Provider    QUESTIONS  Information for Provider? Pt wants pcp to check blood work results that   Dr. Jordy Gaspar pt's foot doctor ordered. Pt has some concerns about   the results. Please Advise   ---------------------------------------------------------------------------  --------------  CALL BACK INFO  What is the best way for the office to contact you? OK to leave message on   voicemail  Preferred Call Back Phone Number? [phone]  ---------------------------------------------------------------------------  --------------  SCRIPT ANSWERS  Relationship to Patient?  Self

## 2020-10-27 RX ORDER — HYDROCHLOROTHIAZIDE 25 MG/1
TABLET ORAL
Qty: 90 TABLET | Refills: 0 | Status: SHIPPED | OUTPATIENT
Start: 2020-10-27

## 2020-11-16 ENCOUNTER — TELEPHONE (OUTPATIENT)
Dept: FAMILY MEDICINE CLINIC | Age: 40
End: 2020-11-16

## 2020-11-16 NOTE — TELEPHONE ENCOUNTER
Patrick Menendez MD  Child-Adolescent-Adult psychiatrist  [facility], [address]      [address], MD  [address] [place] [address],  [address] [place] [address] [id number] [place] [address]

## 2020-11-16 NOTE — TELEPHONE ENCOUNTER
----- Message from [address] [facility] [address] [facility] [address] sent at 11/16/2020  1:50 PM EST -----  Subject: Referral Request    QUESTIONS   Reason for referral request? psychiatrist referral as noted by Dr. Michele Aragon   in a previous appointment   patient looked around for a psych  and was unable to locate one   please refer patient to the [facility] psych    Has the physician seen you for this condition before? Yes  Select a date? 2020-10-20  Select the physician (PCP or Specialist)? Jv Chris   Preferred Specialist (if applicable)? Do you already have an appointment scheduled? No  Additional Information for Provider?   ---------------------------------------------------------------------------  --------------  CALL BACK INFO  What is the best way for the office to contact you? OK to leave message on   voicemail  Preferred Call Back Phone Number?  [phone]

## 2020-12-08 ENCOUNTER — TELEPHONE (OUTPATIENT)
Dept: PULMONOLOGY | Age: 40
End: 2020-12-08

## 2023-03-11 LAB
AVERAGE GLUCOSE: NORMAL
HBA1C MFR BLD: 5.9 %

## 2023-04-19 ENCOUNTER — APPOINTMENT (OUTPATIENT)
Dept: GENERAL RADIOLOGY | Age: 43
End: 2023-04-19
Payer: COMMERCIAL

## 2023-04-19 ENCOUNTER — HOSPITAL ENCOUNTER (EMERGENCY)
Age: 43
Discharge: HOME | End: 2023-04-19
Attending: EMERGENCY MEDICINE
Payer: COMMERCIAL

## 2023-04-19 ENCOUNTER — APPOINTMENT (OUTPATIENT)
Dept: CT IMAGING | Age: 43
End: 2023-04-19
Payer: COMMERCIAL

## 2023-04-19 VITALS
RESPIRATION RATE: 20 BRPM | OXYGEN SATURATION: 100 % | DIASTOLIC BLOOD PRESSURE: 88 MMHG | HEART RATE: 96 BPM | SYSTOLIC BLOOD PRESSURE: 134 MMHG | TEMPERATURE: 98.4 F | HEIGHT: 68 IN | WEIGHT: 204 LBS | BODY MASS INDEX: 30.92 KG/M2

## 2023-04-19 DIAGNOSIS — R07.9 CHEST PAIN, UNSPECIFIED TYPE: Primary | ICD-10-CM

## 2023-04-19 LAB
ALBUMIN SERPL-MCNC: 4.4 G/DL (ref 3.4–5)
ALBUMIN/GLOB SERPL: 1.4 {RATIO} (ref 1.1–2.2)
ALP SERPL-CCNC: 59 U/L (ref 40–129)
ALT SERPL-CCNC: 10 U/L (ref 10–40)
ANION GAP SERPL CALCULATED.3IONS-SCNC: 13 MMOL/L (ref 3–16)
AST SERPL-CCNC: 15 U/L (ref 15–37)
BASOPHILS # BLD: 0 K/UL (ref 0–0.2)
BASOPHILS NFR BLD: 0.5 %
BILIRUB SERPL-MCNC: 0.3 MG/DL (ref 0–1)
BUN SERPL-MCNC: 11 MG/DL (ref 7–20)
CALCIUM SERPL-MCNC: 9.8 MG/DL (ref 8.3–10.6)
CHLORIDE SERPL-SCNC: 102 MMOL/L (ref 99–110)
CO2 SERPL-SCNC: 23 MMOL/L (ref 21–32)
CREAT SERPL-MCNC: 0.8 MG/DL (ref 0.6–1.1)
D DIMER: 0.54 UG/ML FEU (ref 0–0.6)
DEPRECATED RDW RBC AUTO: 13.8 % (ref 12.4–15.4)
EOSINOPHIL # BLD: 0.1 K/UL (ref 0–0.6)
EOSINOPHIL NFR BLD: 0.9 %
GFR SERPLBLD CREATININE-BSD FMLA CKD-EPI: >60 ML/MIN/{1.73_M2}
GLUCOSE SERPL-MCNC: 117 MG/DL (ref 70–99)
HCG SERPL QL: NEGATIVE
HCT VFR BLD AUTO: 39.4 % (ref 36–48)
HGB BLD-MCNC: 12.7 G/DL (ref 12–16)
LYMPHOCYTES # BLD: 2.8 K/UL (ref 1–5.1)
LYMPHOCYTES NFR BLD: 31.9 %
MCH RBC QN AUTO: 26.6 PG (ref 26–34)
MCHC RBC AUTO-ENTMCNC: 32.1 G/DL (ref 31–36)
MCV RBC AUTO: 82.9 FL (ref 80–100)
MONOCYTES # BLD: 0.6 K/UL (ref 0–1.3)
MONOCYTES NFR BLD: 6.6 %
NEUTROPHILS # BLD: 5.4 K/UL (ref 1.7–7.7)
NEUTROPHILS NFR BLD: 60.1 %
NT-PROBNP SERPL-MCNC: 56 PG/ML (ref 0–124)
PLATELET # BLD AUTO: 265 K/UL (ref 135–450)
PMV BLD AUTO: 9.2 FL (ref 5–10.5)
POTASSIUM SERPL-SCNC: 3.9 MMOL/L (ref 3.5–5.1)
PROT SERPL-MCNC: 7.6 G/DL (ref 6.4–8.2)
RBC # BLD AUTO: 4.76 M/UL (ref 4–5.2)
SODIUM SERPL-SCNC: 138 MMOL/L (ref 136–145)
TROPONIN, HIGH SENSITIVITY: <6 NG/L (ref 0–14)
TROPONIN, HIGH SENSITIVITY: <6 NG/L (ref 0–14)
WBC # BLD AUTO: 8.9 K/UL (ref 4–11)

## 2023-04-19 PROCEDURE — 6370000000 HC RX 637 (ALT 250 FOR IP): Performed by: EMERGENCY MEDICINE

## 2023-04-19 PROCEDURE — 96372 THER/PROPH/DIAG INJ SC/IM: CPT

## 2023-04-19 PROCEDURE — 80053 COMPREHEN METABOLIC PANEL: CPT

## 2023-04-19 PROCEDURE — 6360000004 HC RX CONTRAST MEDICATION: Performed by: EMERGENCY MEDICINE

## 2023-04-19 PROCEDURE — 71260 CT THORAX DX C+: CPT

## 2023-04-19 PROCEDURE — 6360000002 HC RX W HCPCS: Performed by: EMERGENCY MEDICINE

## 2023-04-19 PROCEDURE — 99285 EMERGENCY DEPT VISIT HI MDM: CPT

## 2023-04-19 PROCEDURE — 85025 COMPLETE CBC W/AUTO DIFF WBC: CPT

## 2023-04-19 PROCEDURE — 83880 ASSAY OF NATRIURETIC PEPTIDE: CPT

## 2023-04-19 PROCEDURE — 93005 ELECTROCARDIOGRAM TRACING: CPT | Performed by: EMERGENCY MEDICINE

## 2023-04-19 PROCEDURE — 85379 FIBRIN DEGRADATION QUANT: CPT

## 2023-04-19 PROCEDURE — 84703 CHORIONIC GONADOTROPIN ASSAY: CPT

## 2023-04-19 PROCEDURE — 71046 X-RAY EXAM CHEST 2 VIEWS: CPT

## 2023-04-19 PROCEDURE — 84484 ASSAY OF TROPONIN QUANT: CPT

## 2023-04-19 RX ORDER — ONDANSETRON 4 MG/1
4 TABLET, ORALLY DISINTEGRATING ORAL 3 TIMES DAILY PRN
Qty: 21 TABLET | Refills: 0 | Status: SHIPPED | OUTPATIENT
Start: 2023-04-19

## 2023-04-19 RX ORDER — ONDANSETRON 4 MG/1
4 TABLET, ORALLY DISINTEGRATING ORAL ONCE
Status: COMPLETED | OUTPATIENT
Start: 2023-04-19 | End: 2023-04-19

## 2023-04-19 RX ORDER — KETOROLAC TROMETHAMINE 30 MG/ML
30 INJECTION, SOLUTION INTRAMUSCULAR; INTRAVENOUS ONCE
Status: COMPLETED | OUTPATIENT
Start: 2023-04-19 | End: 2023-04-19

## 2023-04-19 RX ORDER — ONDANSETRON 4 MG/1
4 TABLET, ORALLY DISINTEGRATING ORAL 3 TIMES DAILY PRN
Qty: 21 TABLET | Refills: 0 | Status: SHIPPED | OUTPATIENT
Start: 2023-04-19 | End: 2023-04-19 | Stop reason: SDUPTHER

## 2023-04-19 RX ADMIN — KETOROLAC TROMETHAMINE 30 MG: 30 INJECTION, SOLUTION INTRAMUSCULAR at 19:56

## 2023-04-19 RX ADMIN — IOPAMIDOL 75 ML: 755 INJECTION, SOLUTION INTRAVENOUS at 20:45

## 2023-04-19 RX ADMIN — ALUMINUM HYDROXIDE, MAGNESIUM HYDROXIDE, AND SIMETHICONE: 200; 200; 20 SUSPENSION ORAL at 19:57

## 2023-04-19 RX ADMIN — ONDANSETRON 4 MG: 4 TABLET, ORALLY DISINTEGRATING ORAL at 19:56

## 2023-04-19 ASSESSMENT — PAIN DESCRIPTION - LOCATION: LOCATION: BACK;CHEST

## 2023-04-19 ASSESSMENT — PAIN DESCRIPTION - PAIN TYPE: TYPE: ACUTE PAIN

## 2023-04-19 ASSESSMENT — PAIN - FUNCTIONAL ASSESSMENT: PAIN_FUNCTIONAL_ASSESSMENT: 0-10

## 2023-04-19 ASSESSMENT — LIFESTYLE VARIABLES: HOW MANY STANDARD DRINKS CONTAINING ALCOHOL DO YOU HAVE ON A TYPICAL DAY: PATIENT DOES NOT DRINK

## 2023-04-19 ASSESSMENT — PAIN SCALES - GENERAL
PAINLEVEL_OUTOF10: 10
PAINLEVEL_OUTOF10: 10

## 2023-04-19 NOTE — ED PROVIDER NOTES
Doesn't sound infectious. Will get cxr.    ------------    Reassessed patient. Feeling much better. CT chest shows no pulmonary embolism. Troponin unremarkable x2. Heart score less than 4. Because of this I believe that she has less than 1% chance of major cardiac event within the next 30 days. Discharged with all questions answered and return precautions given. Patient is happy about this.      ---------        [unfilled]    Is this patient to be included in the SEP-1 Core Measure due to severe sepsis or septic shock? No   Exclusion criteria - the patient is NOT to be included for SEP-1 Core Measure due to: Infection is not suspected        Final Impression  1. Chest pain, unspecified type        Blood pressure 134/88, pulse 96, temperature 98.4 °F (36.9 °C), temperature source Oral, resp. rate 20, height 5' 8\" (1.727 m), weight 204 lb (92.5 kg), last menstrual period 03/20/2018, SpO2 100 %, not currently breastfeeding. Disposition:  DISPOSITION Decision To Discharge 04/19/2023 10:44:39 PM      Patient Referrals:  No follow-up provider specified. Discharge Medications:  Discharge Medication List as of 4/19/2023 10:46 PM          Discontinued Medications:  Discharge Medication List as of 4/19/2023 10:46 PM          This chart was generated using the [address] [place] [address] [employer] system. I created this record but it may contain dictation errors given the limitations of this technology.        Mook Warren MD  04/20/23 [id number]

## 2023-04-20 ASSESSMENT — HEART SCORE: ECG: 0

## 2023-04-20 NOTE — DISCHARGE INSTRUCTIONS
Please return if you have any new, worsening, or concerning symptoms like inability to eat/drink/walk, fevers. Contact a primary care physician tomorrow to make a follow up appointment this week.

## 2023-04-21 LAB
EKG ATRIAL RATE: 96 BPM
EKG DIAGNOSIS: NORMAL
EKG P AXIS: 55 DEGREES
EKG P-R INTERVAL: 158 MS
EKG Q-T INTERVAL: 348 MS
EKG QRS DURATION: 76 MS
EKG QTC CALCULATION (BAZETT): 439 MS
EKG R AXIS: 63 DEGREES
EKG T AXIS: 62 DEGREES
EKG VENTRICULAR RATE: 96 BPM

## 2023-04-21 PROCEDURE — 93010 ELECTROCARDIOGRAM REPORT: CPT | Performed by: INTERNAL MEDICINE

## 2023-10-18 ENCOUNTER — APPOINTMENT (OUTPATIENT)
Dept: CT IMAGING | Age: 43
End: 2023-10-18
Payer: COMMERCIAL

## 2023-10-18 ENCOUNTER — HOSPITAL ENCOUNTER (EMERGENCY)
Age: 43
Discharge: HOME | End: 2023-10-18
Attending: EMERGENCY MEDICINE
Payer: COMMERCIAL

## 2023-10-18 VITALS
RESPIRATION RATE: 18 BRPM | SYSTOLIC BLOOD PRESSURE: 146 MMHG | TEMPERATURE: 98.4 F | BODY MASS INDEX: 31.17 KG/M2 | WEIGHT: 205 LBS | OXYGEN SATURATION: 100 % | HEART RATE: 85 BPM | DIASTOLIC BLOOD PRESSURE: 94 MMHG

## 2023-10-18 DIAGNOSIS — R10.9 ACUTE RIGHT FLANK PAIN: ICD-10-CM

## 2023-10-18 DIAGNOSIS — S29.019A THORACIC MYOFASCIAL STRAIN, INITIAL ENCOUNTER: Primary | ICD-10-CM

## 2023-10-18 LAB
ALBUMIN SERPL-MCNC: 4.6 G/DL (ref 3.4–5)
ALBUMIN/GLOB SERPL: 1.5 {RATIO} (ref 1.1–2.2)
ALP SERPL-CCNC: 86 U/L (ref 40–129)
ALT SERPL-CCNC: 13 U/L (ref 10–40)
ANION GAP SERPL CALCULATED.3IONS-SCNC: 10 MMOL/L (ref 3–16)
AST SERPL-CCNC: 23 U/L (ref 15–37)
BASOPHILS # BLD: 0.1 K/UL (ref 0–0.2)
BASOPHILS NFR BLD: 0.8 %
BILIRUB SERPL-MCNC: <0.2 MG/DL (ref 0–1)
BILIRUB UR QL STRIP.AUTO: NEGATIVE
BUN SERPL-MCNC: 10 MG/DL (ref 7–20)
CALCIUM SERPL-MCNC: 9.4 MG/DL (ref 8.3–10.6)
CHLORIDE SERPL-SCNC: 103 MMOL/L (ref 99–110)
CLARITY UR: CLEAR
CO2 SERPL-SCNC: 26 MMOL/L (ref 21–32)
COLOR UR: YELLOW
CREAT SERPL-MCNC: 0.8 MG/DL (ref 0.6–1.1)
DEPRECATED RDW RBC AUTO: 14.1 % (ref 12.4–15.4)
EOSINOPHIL # BLD: 0.1 K/UL (ref 0–0.6)
EOSINOPHIL NFR BLD: 1.4 %
GFR SERPLBLD CREATININE-BSD FMLA CKD-EPI: >60 ML/MIN/{1.73_M2}
GLUCOSE SERPL-MCNC: 94 MG/DL (ref 70–99)
GLUCOSE UR STRIP.AUTO-MCNC: NEGATIVE MG/DL
HCT VFR BLD AUTO: 37 % (ref 36–48)
HGB BLD-MCNC: 12.2 G/DL (ref 12–16)
HGB UR QL STRIP.AUTO: NEGATIVE
KETONES UR STRIP.AUTO-MCNC: ABNORMAL MG/DL
LEUKOCYTE ESTERASE UR QL STRIP.AUTO: NEGATIVE
LYMPHOCYTES # BLD: 2.9 K/UL (ref 1–5.1)
LYMPHOCYTES NFR BLD: 33.3 %
MCH RBC QN AUTO: 27.5 PG (ref 26–34)
MCHC RBC AUTO-ENTMCNC: 33.1 G/DL (ref 31–36)
MCV RBC AUTO: 83.1 FL (ref 80–100)
MONOCYTES # BLD: 0.5 K/UL (ref 0–1.3)
MONOCYTES NFR BLD: 6.2 %
NEUTROPHILS # BLD: 5.1 K/UL (ref 1.7–7.7)
NEUTROPHILS NFR BLD: 58.3 %
NITRITE UR QL STRIP.AUTO: NEGATIVE
PH UR STRIP.AUTO: 6 [PH] (ref 5–8)
PLATELET # BLD AUTO: 238 K/UL (ref 135–450)
PMV BLD AUTO: 9.4 FL (ref 5–10.5)
POTASSIUM SERPL-SCNC: 5 MMOL/L (ref 3.5–5.1)
PROT SERPL-MCNC: 7.7 G/DL (ref 6.4–8.2)
PROT UR STRIP.AUTO-MCNC: NEGATIVE MG/DL
RBC # BLD AUTO: 4.46 M/UL (ref 4–5.2)
SODIUM SERPL-SCNC: 139 MMOL/L (ref 136–145)
SP GR UR STRIP.AUTO: 1.02 (ref 1–1.03)
UA COMPLETE W REFLEX CULTURE PNL UR: ABNORMAL
UA DIPSTICK W REFLEX MICRO PNL UR: ABNORMAL
URN SPEC COLLECT METH UR: ABNORMAL
UROBILINOGEN UR STRIP-ACNC: 1 E.U./DL
WBC # BLD AUTO: 8.7 K/UL (ref 4–11)

## 2023-10-18 PROCEDURE — 81003 URINALYSIS AUTO W/O SCOPE: CPT

## 2023-10-18 PROCEDURE — 74176 CT ABD & PELVIS W/O CONTRAST: CPT

## 2023-10-18 PROCEDURE — 99284 EMERGENCY DEPT VISIT MOD MDM: CPT

## 2023-10-18 PROCEDURE — 85025 COMPLETE CBC W/AUTO DIFF WBC: CPT

## 2023-10-18 PROCEDURE — 96374 THER/PROPH/DIAG INJ IV PUSH: CPT

## 2023-10-18 PROCEDURE — 96375 TX/PRO/DX INJ NEW DRUG ADDON: CPT

## 2023-10-18 PROCEDURE — 6360000002 HC RX W HCPCS: Performed by: EMERGENCY MEDICINE

## 2023-10-18 PROCEDURE — 36415 COLL VENOUS BLD VENIPUNCTURE: CPT

## 2023-10-18 PROCEDURE — 6370000000 HC RX 637 (ALT 250 FOR IP): Performed by: EMERGENCY MEDICINE

## 2023-10-18 PROCEDURE — 80053 COMPREHEN METABOLIC PANEL: CPT

## 2023-10-18 RX ORDER — ACETAMINOPHEN 500 MG
1000 TABLET ORAL ONCE
Status: COMPLETED | OUTPATIENT
Start: 2023-10-18 | End: 2023-10-18

## 2023-10-18 RX ORDER — METHOCARBAMOL 750 MG/1
750 TABLET, FILM COATED ORAL EVERY 6 HOURS PRN
Qty: 20 TABLET | Refills: 0 | Status: SHIPPED | OUTPATIENT
Start: 2023-10-18 | End: 2023-10-28

## 2023-10-18 RX ORDER — METHOCARBAMOL 750 MG/1
750 TABLET, FILM COATED ORAL EVERY 6 HOURS PRN
Qty: 20 TABLET | Refills: 0 | Status: SHIPPED | OUTPATIENT
Start: 2023-10-18 | End: 2023-10-18 | Stop reason: SDUPTHER

## 2023-10-18 RX ORDER — NAPROXEN 500 MG/1
500 TABLET ORAL 2 TIMES DAILY WITH MEALS
Qty: 20 TABLET | Refills: 0 | Status: SHIPPED | OUTPATIENT
Start: 2023-10-18 | End: 2023-10-28

## 2023-10-18 RX ORDER — KETOROLAC TROMETHAMINE 15 MG/ML
15 INJECTION, SOLUTION INTRAMUSCULAR; INTRAVENOUS ONCE
Status: COMPLETED | OUTPATIENT
Start: 2023-10-18 | End: 2023-10-18

## 2023-10-18 RX ORDER — ONDANSETRON 2 MG/ML
4 INJECTION INTRAMUSCULAR; INTRAVENOUS ONCE
Status: COMPLETED | OUTPATIENT
Start: 2023-10-18 | End: 2023-10-18

## 2023-10-18 RX ORDER — NAPROXEN 500 MG/1
500 TABLET ORAL 2 TIMES DAILY WITH MEALS
Qty: 20 TABLET | Refills: 0 | Status: SHIPPED | OUTPATIENT
Start: 2023-10-18 | End: 2023-10-18 | Stop reason: SDUPTHER

## 2023-10-18 RX ADMIN — ACETAMINOPHEN 1000 MG: 500 TABLET ORAL at 16:25

## 2023-10-18 RX ADMIN — ONDANSETRON 4 MG: 2 INJECTION INTRAMUSCULAR; INTRAVENOUS at 16:26

## 2023-10-18 RX ADMIN — KETOROLAC TROMETHAMINE 15 MG: 15 INJECTION, SOLUTION INTRAMUSCULAR; INTRAVENOUS at 16:26

## 2023-10-18 ASSESSMENT — PAIN SCALES - GENERAL: PAINLEVEL_OUTOF10: 7

## 2023-10-18 ASSESSMENT — PAIN - FUNCTIONAL ASSESSMENT: PAIN_FUNCTIONAL_ASSESSMENT: 0-10

## 2023-10-19 NOTE — ED PROVIDER NOTES
Past surgical history:   Past Surgical History:   Procedure Laterality Date     SECTION      CHOLECYSTECTOMY      DILATION AND CURETTAGE OF UTERUS  15    FOOT SURGERY Left     Year     HYSTERECTOMY (CERVIX STATUS UNKNOWN)      LUMBAR SPINE SURGERY Left 2019    LEFT L5, S1 TRANSFORAMINAL EPIDURAL STEROID INJECTIONS WITH FLUOROSCOPY performed by Steward Meigs, MD at [address] [facility] Left 2019    LEFT L5, S1 TRANSFORAMINAL EPIDURAL STEROID INJECTION WITH FLUOROSCOPY performed by Steward Meigs, MD at [address] [facility] Left 2019    LEFT L5 AND S1 TRANSFORAMINAL EPIDURAL STEROID INJECTION WITH FLUOROSCOPY performed by Steward Meigs, MD at [address] [facility] FLX DX W/COLLJ [address] [facility] PFRMD N/A 2018    COLONOSCOPY performed by Jluis Cid MD at [facility] medications:   Discharge Medication List as of 10/18/2023  7:45 PM        CONTINUE these medications which have NOT CHANGED    Details   Liraglutide (VICTOZA SC) Inject into the skinHistorical Med      ondansetron (ZOFRAN-ODT) 4 MG disintegrating tablet Take 1 tablet by mouth 3 times daily as needed for Nausea or Vomiting, Disp-21 tablet, R-0Print      hydroCHLOROthiazide (HYDRODIURIL) 25 MG tablet TAKE 1 TABLET BY MOUTH DAILY, Disp-90 tablet, R-0**Patient requests 90 days supply**Normal      oxybutynin (DITROPAN-XL) 10 MG extended release tablet Take 10 mg by mouth dailyHistorical Med      vitamin D (CHOLECALCIFEROL) 1000 UNIT TABS tablet Take 1,000 Units by mouth dailyHistorical Med             Social history:  reports that she has never smoked. She has never used smokeless tobacco. She reports that she does not currently use alcohol after a past usage of about 1.0 standard drink of alcohol per week. She reports that she does not use drugs.     Family history:    Family History   Problem Relation Age of Onset    High Blood Pressure Mother

## 2024-03-22 ENCOUNTER — OFFICE VISIT (OUTPATIENT)
Dept: FAMILY MEDICINE CLINIC | Age: 44
End: 2024-03-22

## 2024-03-22 VITALS
DIASTOLIC BLOOD PRESSURE: 88 MMHG | WEIGHT: 211.4 LBS | SYSTOLIC BLOOD PRESSURE: 122 MMHG | OXYGEN SATURATION: 93 % | HEIGHT: 68 IN | BODY MASS INDEX: 32.04 KG/M2 | TEMPERATURE: 98 F | HEART RATE: 75 BPM

## 2024-03-22 DIAGNOSIS — K21.9 GASTROESOPHAGEAL REFLUX DISEASE WITHOUT ESOPHAGITIS: ICD-10-CM

## 2024-03-22 DIAGNOSIS — G89.29 CHRONIC MIDLINE LOW BACK PAIN WITH RIGHT-SIDED SCIATICA: ICD-10-CM

## 2024-03-22 DIAGNOSIS — R73.03 PRE-DIABETES: ICD-10-CM

## 2024-03-22 DIAGNOSIS — M54.41 CHRONIC MIDLINE LOW BACK PAIN WITH RIGHT-SIDED SCIATICA: ICD-10-CM

## 2024-03-22 DIAGNOSIS — N39.3 STRESS INCONTINENCE OF URINE: ICD-10-CM

## 2024-03-22 DIAGNOSIS — R73.03 PREDIABETES: ICD-10-CM

## 2024-03-22 DIAGNOSIS — I10 ESSENTIAL HYPERTENSION: Primary | ICD-10-CM

## 2024-03-22 LAB — HBA1C MFR BLD: 5 %

## 2024-03-22 RX ORDER — BLOOD SUGAR DIAGNOSTIC
STRIP MISCELLANEOUS
COMMUNITY
Start: 2024-01-19

## 2024-03-22 RX ORDER — METHYLPREDNISOLONE 4 MG/1
4 TABLET ORAL SEE ADMIN INSTRUCTIONS
Qty: 1 KIT | Refills: 0 | Status: SHIPPED | OUTPATIENT
Start: 2024-03-22 | End: 2024-03-22 | Stop reason: ALTCHOICE

## 2024-03-22 RX ORDER — OXYBUTYNIN CHLORIDE 10 MG/1
TABLET, EXTENDED RELEASE ORAL
COMMUNITY
Start: 2020-07-30

## 2024-03-22 RX ORDER — METHOCARBAMOL 750 MG/1
750 TABLET, FILM COATED ORAL
COMMUNITY
Start: 2023-10-18 | End: 2024-03-22 | Stop reason: ALTCHOICE

## 2024-03-22 RX ORDER — HYDROCHLOROTHIAZIDE 12.5 MG/1
12.5 CAPSULE, GELATIN COATED ORAL EVERY MORNING
Qty: 90 CAPSULE | Refills: 1 | Status: SHIPPED | OUTPATIENT
Start: 2024-03-22

## 2024-03-22 RX ORDER — OMEPRAZOLE 20 MG/1
20 CAPSULE, DELAYED RELEASE ORAL DAILY PRN
COMMUNITY
Start: 2023-10-20

## 2024-03-22 RX ORDER — FLUCONAZOLE 100 MG/1
100 TABLET ORAL 2 TIMES DAILY
COMMUNITY
Start: 2023-03-28 | End: 2024-03-22 | Stop reason: ALTCHOICE

## 2024-03-22 RX ORDER — VALSARTAN 160 MG/1
160 TABLET ORAL DAILY
COMMUNITY
Start: 2024-02-26 | End: 2024-03-22 | Stop reason: ALTCHOICE

## 2024-03-22 RX ORDER — PEN NEEDLE, DIABETIC 32GX 5/32"
NEEDLE, DISPOSABLE MISCELLANEOUS
COMMUNITY
Start: 2024-03-19

## 2024-03-22 RX ORDER — METHYLPREDNISOLONE 4 MG/1
4 TABLET ORAL SEE ADMIN INSTRUCTIONS
Qty: 1 KIT | Refills: 0 | Status: SHIPPED | OUTPATIENT
Start: 2024-03-22 | End: 2024-03-29

## 2024-03-22 SDOH — ECONOMIC STABILITY: HOUSING INSECURITY
IN THE LAST 12 MONTHS, WAS THERE A TIME WHEN YOU DID NOT HAVE A STEADY PLACE TO SLEEP OR SLEPT IN A SHELTER (INCLUDING NOW)?: NO

## 2024-03-22 SDOH — ECONOMIC STABILITY: FOOD INSECURITY: WITHIN THE PAST 12 MONTHS, YOU WORRIED THAT YOUR FOOD WOULD RUN OUT BEFORE YOU GOT MONEY TO BUY MORE.: NEVER TRUE

## 2024-03-22 SDOH — ECONOMIC STABILITY: FOOD INSECURITY: WITHIN THE PAST 12 MONTHS, THE FOOD YOU BOUGHT JUST DIDN'T LAST AND YOU DIDN'T HAVE MONEY TO GET MORE.: NEVER TRUE

## 2024-03-22 SDOH — ECONOMIC STABILITY: TRANSPORTATION INSECURITY
IN THE PAST 12 MONTHS, HAS LACK OF TRANSPORTATION KEPT YOU FROM MEETINGS, WORK, OR FROM GETTING THINGS NEEDED FOR DAILY LIVING?: NO

## 2024-03-22 SDOH — ECONOMIC STABILITY: INCOME INSECURITY: HOW HARD IS IT FOR YOU TO PAY FOR THE VERY BASICS LIKE FOOD, HOUSING, MEDICAL CARE, AND HEATING?: NOT HARD AT ALL

## 2024-03-22 ASSESSMENT — ANXIETY QUESTIONNAIRES
2. NOT BEING ABLE TO STOP OR CONTROL WORRYING: NOT AT ALL
7. FEELING AFRAID AS IF SOMETHING AWFUL MIGHT HAPPEN: NOT AT ALL
1. FEELING NERVOUS, ANXIOUS, OR ON EDGE: MORE THAN HALF THE DAYS
5. BEING SO RESTLESS THAT IT IS HARD TO SIT STILL: MORE THAN HALF THE DAYS
4. TROUBLE RELAXING: MORE THAN HALF THE DAYS
IF YOU CHECKED OFF ANY PROBLEMS ON THIS QUESTIONNAIRE, HOW DIFFICULT HAVE THESE PROBLEMS MADE IT FOR YOU TO DO YOUR WORK, TAKE CARE OF THINGS AT HOME, OR GET ALONG WITH OTHER PEOPLE: SOMEWHAT DIFFICULT
GAD7 TOTAL SCORE: 6
6. BECOMING EASILY ANNOYED OR IRRITABLE: NOT AT ALL
3. WORRYING TOO MUCH ABOUT DIFFERENT THINGS: NOT AT ALL

## 2024-03-22 ASSESSMENT — PATIENT HEALTH QUESTIONNAIRE - PHQ9
SUM OF ALL RESPONSES TO PHQ9 QUESTIONS 1 & 2: 0
SUM OF ALL RESPONSES TO PHQ QUESTIONS 1-9: 6
3. TROUBLE FALLING OR STAYING ASLEEP: SEVERAL DAYS
SUM OF ALL RESPONSES TO PHQ QUESTIONS 1-9: 6
2. FEELING DOWN, DEPRESSED OR HOPELESS: NOT AT ALL
5. POOR APPETITE OR OVEREATING: NOT AT ALL
SUM OF ALL RESPONSES TO PHQ QUESTIONS 1-9: 6
1. LITTLE INTEREST OR PLEASURE IN DOING THINGS: NEARLY EVERY DAY
9. THOUGHTS THAT YOU WOULD BE BETTER OFF DEAD, OR OF HURTING YOURSELF: NOT AT ALL
SUM OF ALL RESPONSES TO PHQ QUESTIONS 1-9: 6
7. TROUBLE CONCENTRATING ON THINGS, SUCH AS READING THE NEWSPAPER OR WATCHING TELEVISION: NOT AT ALL
4. FEELING TIRED OR HAVING LITTLE ENERGY: SEVERAL DAYS
6. FEELING BAD ABOUT YOURSELF - OR THAT YOU ARE A FAILURE OR HAVE LET YOURSELF OR YOUR FAMILY DOWN: SEVERAL DAYS
8. MOVING OR SPEAKING SO SLOWLY THAT OTHER PEOPLE COULD HAVE NOTICED. OR THE OPPOSITE, BEING SO FIGETY OR RESTLESS THAT YOU HAVE BEEN MOVING AROUND A LOT MORE THAN USUAL: NOT AT ALL
1. LITTLE INTEREST OR PLEASURE IN DOING THINGS: NOT AT ALL
10. IF YOU CHECKED OFF ANY PROBLEMS, HOW DIFFICULT HAVE THESE PROBLEMS MADE IT FOR YOU TO DO YOUR WORK, TAKE CARE OF THINGS AT HOME, OR GET ALONG WITH OTHER PEOPLE: NOT DIFFICULT AT ALL
SUM OF ALL RESPONSES TO PHQ9 QUESTIONS 1 & 2: 3
2. FEELING DOWN, DEPRESSED OR HOPELESS: NOT AT ALL

## 2024-03-22 ASSESSMENT — ENCOUNTER SYMPTOMS
WHEEZING: 0
BACK PAIN: 1
RHINORRHEA: 0
EYES NEGATIVE: 1
EYE PAIN: 0
SORE THROAT: 0
SHORTNESS OF BREATH: 0
EYE ITCHING: 0
COUGH: 0
SINUS PRESSURE: 0

## 2024-03-22 NOTE — PROGRESS NOTES
Marleny Anton (:  1980) is a 43 y.o. female,New patient, here for evaluation of the following chief complaint(s):  New Patient (Pt has question about health )          Subjective   SUBJECTIVE/OBJECTIVE:  HPI  43-year-old female patient here to establish care.    Medical issues include-  Prediabetes/diabetes?-Patient says she has been diagnosed with prediabetes leading up to diabetes.  Last A1c 2 months ago was 5.7.  She was provided with Ozempic samples from the last PCP, she tried for a month about 4 weeks ago.  Has not had anything since, in the past she has tried Victoza.  Patient says that these medications make her sick, she has blackout events.    Hypertension-was prescribed Diovan, patient has been not taking that medication for over a month because of the headaches related to the medication.  She says hydrochlorothiazide was prescribed to her in the past which works well for her.  She is currently not on any blood pressure medication and her blood pressure is 122/88.  Patient says blood pressure was high last week    Patient had pannulectomy following weight loss surgery to remove excess abdomen fat.  Since then she states she has acid reflux and nausea as needed-she takes omeprazole and Zofran as needed.    She takes Ditropan for urinary incontinence as needed.      Patient complains of chronic lumbar back pain, she has tried physical therapy multiple times.  MRI 2019 shows small nonCompressive disc extrusion at L5-S1.  Patient has steroid shots multiple times which did not help.  Patient does not want to do pain management but is actually having an acute flareup and is wanting steroid taper for few days  Review of Systems   Constitutional: Negative.  Negative for fatigue.   HENT:  Negative for congestion, ear pain, rhinorrhea, sinus pressure and sore throat.    Eyes: Negative.  Negative for pain and itching.   Respiratory:  Negative for cough, shortness of breath and wheezing.    Cardiovascular:

## 2024-03-23 ENCOUNTER — HOSPITAL ENCOUNTER (EMERGENCY)
Age: 44
Discharge: HOME | End: 2024-03-23
Payer: COMMERCIAL

## 2024-03-23 ENCOUNTER — APPOINTMENT (OUTPATIENT)
Dept: CT IMAGING | Age: 44
End: 2024-03-23
Payer: COMMERCIAL

## 2024-03-23 ENCOUNTER — APPOINTMENT (OUTPATIENT)
Dept: ULTRASOUND IMAGING | Age: 44
End: 2024-03-23
Payer: COMMERCIAL

## 2024-03-23 VITALS
WEIGHT: 212 LBS | RESPIRATION RATE: 14 BRPM | DIASTOLIC BLOOD PRESSURE: 85 MMHG | SYSTOLIC BLOOD PRESSURE: 151 MMHG | BODY MASS INDEX: 32.13 KG/M2 | TEMPERATURE: 98.3 F | HEIGHT: 68 IN | HEART RATE: 72 BPM | OXYGEN SATURATION: 100 %

## 2024-03-23 DIAGNOSIS — R10.9 FLANK PAIN: Primary | ICD-10-CM

## 2024-03-23 DIAGNOSIS — N83.202 CYST OF LEFT OVARY: ICD-10-CM

## 2024-03-23 DIAGNOSIS — N30.00 ACUTE CYSTITIS WITHOUT HEMATURIA: ICD-10-CM

## 2024-03-23 LAB
ALBUMIN SERPL-MCNC: 4.6 G/DL (ref 3.4–5)
ALBUMIN/GLOB SERPL: 1.4 {RATIO} (ref 1.1–2.2)
ALP SERPL-CCNC: 72 U/L (ref 40–129)
ALT SERPL-CCNC: 13 U/L (ref 10–40)
ANION GAP SERPL CALCULATED.3IONS-SCNC: 13 MMOL/L (ref 3–16)
AST SERPL-CCNC: 21 U/L (ref 15–37)
BACTERIA URNS QL MICRO: ABNORMAL /HPF
BASOPHILS # BLD: 0.1 K/UL (ref 0–0.2)
BASOPHILS NFR BLD: 0.7 %
BILIRUB SERPL-MCNC: 0.3 MG/DL (ref 0–1)
BILIRUB UR QL STRIP.AUTO: NEGATIVE
BUN SERPL-MCNC: 10 MG/DL (ref 7–20)
CALCIUM SERPL-MCNC: 9.5 MG/DL (ref 8.3–10.6)
CHLORIDE SERPL-SCNC: 103 MMOL/L (ref 99–110)
CLARITY UR: ABNORMAL
CO2 SERPL-SCNC: 23 MMOL/L (ref 21–32)
COLOR UR: YELLOW
CREAT SERPL-MCNC: 0.7 MG/DL (ref 0.6–1.1)
DEPRECATED RDW RBC AUTO: 13.5 % (ref 12.4–15.4)
EOSINOPHIL # BLD: 0.1 K/UL (ref 0–0.6)
EOSINOPHIL NFR BLD: 0.9 %
EPI CELLS #/AREA URNS AUTO: 4 /HPF (ref 0–5)
GFR SERPLBLD CREATININE-BSD FMLA CKD-EPI: >60 ML/MIN/{1.73_M2}
GLUCOSE SERPL-MCNC: 100 MG/DL (ref 70–99)
GLUCOSE UR STRIP.AUTO-MCNC: NEGATIVE MG/DL
HCT VFR BLD AUTO: 40.7 % (ref 36–48)
HGB BLD-MCNC: 13.2 G/DL (ref 12–16)
HGB UR QL STRIP.AUTO: NEGATIVE
HYALINE CASTS #/AREA URNS AUTO: 4 /LPF (ref 0–8)
KETONES UR STRIP.AUTO-MCNC: NEGATIVE MG/DL
LEUKOCYTE ESTERASE UR QL STRIP.AUTO: NEGATIVE
LIPASE SERPL-CCNC: 27 U/L (ref 13–60)
LYMPHOCYTES # BLD: 3.1 K/UL (ref 1–5.1)
LYMPHOCYTES NFR BLD: 32.1 %
MCH RBC QN AUTO: 26.9 PG (ref 26–34)
MCHC RBC AUTO-ENTMCNC: 32.4 G/DL (ref 31–36)
MCV RBC AUTO: 82.9 FL (ref 80–100)
MONOCYTES # BLD: 0.6 K/UL (ref 0–1.3)
MONOCYTES NFR BLD: 6.5 %
NEUTROPHILS # BLD: 5.7 K/UL (ref 1.7–7.7)
NEUTROPHILS NFR BLD: 59.8 %
NITRITE UR QL STRIP.AUTO: NEGATIVE
PH UR STRIP.AUTO: 5.5 [PH] (ref 5–8)
PLATELET # BLD AUTO: 274 K/UL (ref 135–450)
PMV BLD AUTO: 9.4 FL (ref 5–10.5)
POTASSIUM SERPL-SCNC: 3.6 MMOL/L (ref 3.5–5.1)
PROT SERPL-MCNC: 7.9 G/DL (ref 6.4–8.2)
PROT UR STRIP.AUTO-MCNC: ABNORMAL MG/DL
RBC # BLD AUTO: 4.91 M/UL (ref 4–5.2)
RBC CLUMPS #/AREA URNS AUTO: 2 /HPF (ref 0–4)
SODIUM SERPL-SCNC: 139 MMOL/L (ref 136–145)
SP GR UR STRIP.AUTO: 1.02 (ref 1–1.03)
TROPONIN, HIGH SENSITIVITY: <6 NG/L (ref 0–14)
TROPONIN, HIGH SENSITIVITY: <6 NG/L (ref 0–14)
UA COMPLETE W REFLEX CULTURE PNL UR: YES
UA DIPSTICK W REFLEX MICRO PNL UR: YES
URN SPEC COLLECT METH UR: ABNORMAL
UROBILINOGEN UR STRIP-ACNC: 1 E.U./DL
WBC # BLD AUTO: 9.6 K/UL (ref 4–11)
WBC #/AREA URNS AUTO: 25 /HPF (ref 0–5)

## 2024-03-23 PROCEDURE — 84484 ASSAY OF TROPONIN QUANT: CPT

## 2024-03-23 PROCEDURE — 96375 TX/PRO/DX INJ NEW DRUG ADDON: CPT

## 2024-03-23 PROCEDURE — 74177 CT ABD & PELVIS W/CONTRAST: CPT

## 2024-03-23 PROCEDURE — 81001 URINALYSIS AUTO W/SCOPE: CPT

## 2024-03-23 PROCEDURE — 99285 EMERGENCY DEPT VISIT HI MDM: CPT

## 2024-03-23 PROCEDURE — 6360000004 HC RX CONTRAST MEDICATION: Performed by: PHYSICIAN ASSISTANT

## 2024-03-23 PROCEDURE — 83690 ASSAY OF LIPASE: CPT

## 2024-03-23 PROCEDURE — 6360000002 HC RX W HCPCS: Performed by: PHYSICIAN ASSISTANT

## 2024-03-23 PROCEDURE — 76856 US EXAM PELVIC COMPLETE: CPT

## 2024-03-23 PROCEDURE — 36415 COLL VENOUS BLD VENIPUNCTURE: CPT

## 2024-03-23 PROCEDURE — 87086 URINE CULTURE/COLONY COUNT: CPT

## 2024-03-23 PROCEDURE — 93975 VASCULAR STUDY: CPT

## 2024-03-23 PROCEDURE — 71260 CT THORAX DX C+: CPT

## 2024-03-23 PROCEDURE — 76830 TRANSVAGINAL US NON-OB: CPT

## 2024-03-23 PROCEDURE — 85025 COMPLETE CBC W/AUTO DIFF WBC: CPT

## 2024-03-23 PROCEDURE — 96374 THER/PROPH/DIAG INJ IV PUSH: CPT

## 2024-03-23 PROCEDURE — 80053 COMPREHEN METABOLIC PANEL: CPT

## 2024-03-23 RX ORDER — TRAMADOL HYDROCHLORIDE 50 MG/1
50 TABLET ORAL EVERY 6 HOURS PRN
Qty: 20 TABLET | Refills: 0 | Status: SHIPPED | OUTPATIENT
Start: 2024-03-23 | End: 2024-03-28

## 2024-03-23 RX ORDER — MORPHINE SULFATE 4 MG/ML
4 INJECTION, SOLUTION INTRAMUSCULAR; INTRAVENOUS ONCE
Status: COMPLETED | OUTPATIENT
Start: 2024-03-23 | End: 2024-03-23

## 2024-03-23 RX ORDER — CIPROFLOXACIN 500 MG/1
500 TABLET, FILM COATED ORAL 2 TIMES DAILY
Qty: 20 TABLET | Refills: 0 | Status: SHIPPED | OUTPATIENT
Start: 2024-03-23 | End: 2024-03-23

## 2024-03-23 RX ORDER — NAPROXEN 500 MG/1
500 TABLET ORAL 2 TIMES DAILY WITH MEALS
Qty: 30 TABLET | Refills: 0 | Status: SHIPPED | OUTPATIENT
Start: 2024-03-23

## 2024-03-23 RX ORDER — KETOROLAC TROMETHAMINE 30 MG/ML
30 INJECTION, SOLUTION INTRAMUSCULAR; INTRAVENOUS ONCE
Status: COMPLETED | OUTPATIENT
Start: 2024-03-23 | End: 2024-03-23

## 2024-03-23 RX ORDER — ONDANSETRON 2 MG/ML
4 INJECTION INTRAMUSCULAR; INTRAVENOUS ONCE
Status: COMPLETED | OUTPATIENT
Start: 2024-03-23 | End: 2024-03-23

## 2024-03-23 RX ORDER — CIPROFLOXACIN 500 MG/1
500 TABLET, FILM COATED ORAL 2 TIMES DAILY
Qty: 20 TABLET | Refills: 0 | Status: SHIPPED | OUTPATIENT
Start: 2024-03-23 | End: 2024-04-02

## 2024-03-23 RX ORDER — NAPROXEN 500 MG/1
500 TABLET ORAL 2 TIMES DAILY WITH MEALS
Qty: 30 TABLET | Refills: 0 | Status: SHIPPED | OUTPATIENT
Start: 2024-03-23 | End: 2024-03-23

## 2024-03-23 RX ORDER — TRAMADOL HYDROCHLORIDE 50 MG/1
50 TABLET ORAL EVERY 6 HOURS PRN
Qty: 20 TABLET | Refills: 0 | Status: SHIPPED | OUTPATIENT
Start: 2024-03-23 | End: 2024-03-23

## 2024-03-23 RX ORDER — ONDANSETRON 4 MG/1
4 TABLET, ORALLY DISINTEGRATING ORAL 3 TIMES DAILY PRN
Qty: 21 TABLET | Refills: 0 | Status: SHIPPED | OUTPATIENT
Start: 2024-03-23

## 2024-03-23 RX ORDER — METHOCARBAMOL 100 MG/ML
1000 INJECTION, SOLUTION INTRAMUSCULAR; INTRAVENOUS ONCE
Status: COMPLETED | OUTPATIENT
Start: 2024-03-23 | End: 2024-03-23

## 2024-03-23 RX ORDER — ONDANSETRON 4 MG/1
4 TABLET, ORALLY DISINTEGRATING ORAL 3 TIMES DAILY PRN
Qty: 21 TABLET | Refills: 0 | Status: SHIPPED | OUTPATIENT
Start: 2024-03-23 | End: 2024-03-23

## 2024-03-23 RX ADMIN — ONDANSETRON 4 MG: 2 INJECTION INTRAMUSCULAR; INTRAVENOUS at 12:48

## 2024-03-23 RX ADMIN — METHOCARBAMOL 1000 MG: 100 INJECTION INTRAMUSCULAR; INTRAVENOUS at 12:48

## 2024-03-23 RX ADMIN — IOPAMIDOL 75 ML: 755 INJECTION, SOLUTION INTRAVENOUS at 13:34

## 2024-03-23 RX ADMIN — KETOROLAC TROMETHAMINE 30 MG: 30 INJECTION INTRAMUSCULAR; INTRAVENOUS at 16:20

## 2024-03-23 RX ADMIN — MORPHINE SULFATE 4 MG: 4 INJECTION, SOLUTION INTRAMUSCULAR; INTRAVENOUS at 12:48

## 2024-03-23 ASSESSMENT — ENCOUNTER SYMPTOMS
NAUSEA: 0
ABDOMINAL PAIN: 1
DIARRHEA: 0
VOMITING: 0
RESPIRATORY NEGATIVE: 1
CONSTIPATION: 0
SHORTNESS OF BREATH: 0
COLOR CHANGE: 0
COUGH: 0
CHEST TIGHTNESS: 0

## 2024-03-23 ASSESSMENT — PAIN DESCRIPTION - LOCATION
LOCATION: ABDOMEN
LOCATION: ABDOMEN

## 2024-03-23 ASSESSMENT — PAIN SCALES - GENERAL
PAINLEVEL_OUTOF10: 9
PAINLEVEL_OUTOF10: 10
PAINLEVEL_OUTOF10: 6
PAINLEVEL_OUTOF10: 8
PAINLEVEL_OUTOF10: 3

## 2024-03-23 ASSESSMENT — LIFESTYLE VARIABLES
HOW MANY STANDARD DRINKS CONTAINING ALCOHOL DO YOU HAVE ON A TYPICAL DAY: 1 OR 2
HOW OFTEN DO YOU HAVE A DRINK CONTAINING ALCOHOL: MONTHLY OR LESS

## 2024-03-24 LAB
BACTERIA UR CULT: NORMAL
EKG ATRIAL RATE: 87 BPM
EKG DIAGNOSIS: NORMAL
EKG P AXIS: 69 DEGREES
EKG P-R INTERVAL: 150 MS
EKG Q-T INTERVAL: 354 MS
EKG QRS DURATION: 86 MS
EKG QTC CALCULATION (BAZETT): 425 MS
EKG R AXIS: 61 DEGREES
EKG T AXIS: 66 DEGREES
EKG VENTRICULAR RATE: 87 BPM

## 2024-04-05 ENCOUNTER — OFFICE VISIT (OUTPATIENT)
Dept: FAMILY MEDICINE CLINIC | Age: 44
End: 2024-04-05
Payer: COMMERCIAL

## 2024-04-05 VITALS
DIASTOLIC BLOOD PRESSURE: 88 MMHG | RESPIRATION RATE: 16 BRPM | WEIGHT: 210 LBS | HEART RATE: 72 BPM | TEMPERATURE: 98 F | SYSTOLIC BLOOD PRESSURE: 138 MMHG | OXYGEN SATURATION: 100 % | HEIGHT: 68 IN | BODY MASS INDEX: 31.83 KG/M2

## 2024-04-05 DIAGNOSIS — I10 ESSENTIAL HYPERTENSION: ICD-10-CM

## 2024-04-05 DIAGNOSIS — N30.00 ACUTE CYSTITIS WITHOUT HEMATURIA: ICD-10-CM

## 2024-04-05 DIAGNOSIS — E16.2 HYPOGLYCEMIA: ICD-10-CM

## 2024-04-05 DIAGNOSIS — N83.202 HEMORRHAGIC CYST OF LEFT OVARY: Primary | ICD-10-CM

## 2024-04-05 LAB
BILIRUBIN, POC: NORMAL
BLOOD URINE, POC: NORMAL
CLARITY, POC: NORMAL
COLOR, POC: YELLOW
GLUCOSE URINE, POC: NORMAL
KETONES, POC: NORMAL
LEUKOCYTE EST, POC: NORMAL
NITRITE, POC: NORMAL
PH, POC: 7
PROTEIN, POC: NORMAL
SPECIFIC GRAVITY, POC: 1.03
UROBILINOGEN, POC: 0.2

## 2024-04-05 PROCEDURE — 81002 URINALYSIS NONAUTO W/O SCOPE: CPT | Performed by: FAMILY MEDICINE

## 2024-04-05 PROCEDURE — 99214 OFFICE O/P EST MOD 30 MIN: CPT | Performed by: FAMILY MEDICINE

## 2024-04-05 PROCEDURE — 3079F DIAST BP 80-89 MM HG: CPT | Performed by: FAMILY MEDICINE

## 2024-04-05 PROCEDURE — G8427 DOCREV CUR MEDS BY ELIG CLIN: HCPCS | Performed by: FAMILY MEDICINE

## 2024-04-05 PROCEDURE — 1036F TOBACCO NON-USER: CPT | Performed by: FAMILY MEDICINE

## 2024-04-05 PROCEDURE — G8417 CALC BMI ABV UP PARAM F/U: HCPCS | Performed by: FAMILY MEDICINE

## 2024-04-05 PROCEDURE — 3075F SYST BP GE 130 - 139MM HG: CPT | Performed by: FAMILY MEDICINE

## 2024-04-05 ASSESSMENT — ENCOUNTER SYMPTOMS
EYE PAIN: 0
SORE THROAT: 0
WHEEZING: 0
COUGH: 0
SHORTNESS OF BREATH: 0
EYE ITCHING: 0
RHINORRHEA: 0
ABDOMINAL PAIN: 0
SINUS PRESSURE: 0
EYES NEGATIVE: 1

## 2024-04-05 NOTE — PROGRESS NOTES
Marleny Anton (:  1980) is a 43 y.o. female,Established patient, here for evaluation of the following chief complaint(s):  Other and Follow-up (Follow up)          Subjective   SUBJECTIVE/OBJECTIVE:  HPI  43-year-old female patient here for 2-week follow-up.  Hypertension-on HCTZ 12.5 mg, blood pressure well-controlled.  Patient recently visited urgent care with his for flank pain, records reviewed diagnosed with UTI and discharged on Cipro.  Ultrasound Result (most recent):  US PELVIS COMPLETE 2024    Narrative  EXAMINATION:  PELVIC ULTRASOUND; DOPPLER EVALUATION OF THE PELVIS    3/23/2024    TECHNIQUE:  Transvaginal pelvic ultrasound duplex ultrasound using B-mode/gray scaled  imaging, Doppler spectral analysis and color flow Doppler was obtained.    COMPARISON:  CT abdomen and pelvis obtained on the same day.    HISTORY:  ORDERING SYSTEM PROVIDED HISTORY: ro torsion  TECHNOLOGIST PROVIDED HISTORY:    Reason for exam:->ro torsion  Reason for Exam: left ovarian cyst - ro torsion  Additional signs and symptoms: r/o ov torsion   back pain/ RLQ pain/ LLQ pain  x 3 days per patient  Relevant Medical/Surgical History: s/p hysterectomy     FINDINGS:    Measurements:    Uterus: Not visualized corresponding to the provided history of hysterectomy.    Right Ovary:1.7 x 3.5 x 1.4 cm    Left Ovary: 5.0 x 3.5 x 4.1 cm      Ultrasound Findings:    Right Ovary: No dominant adnexal lesion seen.  Arterial and venous flow of  the right ovary appear preserved.    Left Ovary:  There is a 3.9 x 3.0 x 3.8 cm heterogenous cystic lesion of the  left ovary with lace-like internal echoes, which may reflect underlying  hemorrhagic cyst.  Arterial and venous flow of the left ovary appear  preserved.    Free Fluid: There is nonspecific small amount of free fluid in the pelvis.    Impression  3.9 cm suspected hemorrhagic cyst in the left ovary.  No evidence of ovarian  torsion seen.    Nonspecific small amount of free fluid

## 2024-06-26 ENCOUNTER — COMMUNITY OUTREACH (OUTPATIENT)
Dept: FAMILY MEDICINE CLINIC | Age: 44
End: 2024-06-26

## 2024-06-26 NOTE — PROGRESS NOTES
Patient's HM shows they are overdue for Mammogram.  Care Everywhere and  files searched.  No results to attach to order nor HM updated.      9/2022 comparison in [facility], no mammogram result.

## 2024-09-23 DIAGNOSIS — I10 ESSENTIAL HYPERTENSION: ICD-10-CM

## 2024-09-23 RX ORDER — HYDROCHLOROTHIAZIDE 12.5 MG/1
12.5 CAPSULE ORAL EVERY MORNING
Qty: 15 CAPSULE | Refills: 0 | Status: SHIPPED | OUTPATIENT
Start: 2024-09-23

## 2024-10-01 DIAGNOSIS — I10 ESSENTIAL HYPERTENSION: ICD-10-CM

## 2024-10-02 RX ORDER — HYDROCHLOROTHIAZIDE 12.5 MG/1
12.5 CAPSULE ORAL EVERY MORNING
Qty: 90 CAPSULE | Refills: 1 | OUTPATIENT
Start: 2024-10-02

## 2024-10-02 NOTE — TELEPHONE ENCOUNTER
Medication:   Requested Prescriptions     Pending Prescriptions Disp Refills    hydroCHLOROthiazide 12.5 MG capsule [Pharmacy Med Name: HYDROCHLOROTHIAZIDE 12.5 MG CP] 90 capsule 1     Sig: TAKE 1 CAPSULE BY MOUTH EVERY DAY IN THE MORNING        Last Filled:09/23/2024       Patient Phone Number: [phone] (home)     Last appt: 4/5/2024   Next appt: Visit date not found    Last OARRS:        No data to display

## 2024-11-14 NOTE — ED PROVIDER NOTES
I did not perform history or physical on Marleny Anton.  This patient was seen independently by an LIZZY. I did review the EKG, which is documented below.     EKG  The Ekg interpreted by me in the absence of a cardiologist shows.  normal sinus rhythm with a rate of 87  Axis is   Normal  QTc is  normal  Intervals and Durations are unremarkable.      No specific ST-T wave changes appreciated.  No evidence of acute ischemia.   No significant change from prior EKG dated 4/19/2023          Gregg Henriquez MD  03/23/24 0477    
rebound. Negative signs include Obando's sign and McBurney's sign.      Hernia: No hernia is present.   Musculoskeletal:         General: Normal range of motion.      Cervical back: Normal range of motion and neck supple. No rigidity or tenderness.      Comments: Mild tenderness palpation to the right thoracic and lumbar paraspinal musculature.  No midline tenderness of cervical, thoracic or lumbar spine.  No crepitus or step-off.  No CVA tenderness.  Full range of motion and strength to the upper and lower extremity throughout.  Sensation intact light touch to the upper and lower extremity throughout.  Gait is normal.    Lymphadenopathy:      Cervical: No cervical adenopathy.   Skin:     General: Skin is warm and dry.      Coloration: Skin is not pale.      Findings: No erythema or rash.   Neurological:      Mental Status: She is alert and oriented to person, place, and time.      GCS: GCS eye subscore is 4. GCS verbal subscore is 5. GCS motor subscore is 6.      Sensory: Sensation is intact. No sensory deficit.      Motor: Motor function is intact. No weakness or tremor.      Coordination: Coordination is intact. Coordination normal.      Gait: Gait is intact. Gait normal.   Psychiatric:         Behavior: Behavior normal.           DIAGNOSTIC RESULTS   LABS:    Labs Reviewed   COMPREHENSIVE METABOLIC PANEL W/ REFLEX TO MG FOR LOW K - Abnormal; Notable for the following components:       Result Value    Glucose 100 (*)     All other components within normal limits   URINALYSIS WITH REFLEX TO CULTURE - Abnormal; Notable for the following components:    Clarity, UA CLOUDY (*)     Protein, UA TRACE (*)     All other components within normal limits   MICROSCOPIC URINALYSIS - Abnormal; Notable for the following components:    Bacteria, UA 4+ (*)     WBC, UA 25 (*)     All other components within normal limits   CULTURE, URINE   CBC WITH AUTO DIFFERENTIAL   TROPONIN   LIPASE   TROPONIN       When ordered only abnormal 
[Time Spent: ___ minutes] : I have spent [unfilled] minutes of time on the encounter which excludes teaching and separately reported services.

## 2025-02-02 ENCOUNTER — HOSPITAL ENCOUNTER (EMERGENCY)
Age: 45
Discharge: HOME | End: 2025-02-02
Payer: COMMERCIAL

## 2025-02-02 VITALS
BODY MASS INDEX: 35 KG/M2 | OXYGEN SATURATION: 94 % | HEART RATE: 96 BPM | RESPIRATION RATE: 18 BRPM | TEMPERATURE: 98.5 F | SYSTOLIC BLOOD PRESSURE: 159 MMHG | HEIGHT: 67 IN | DIASTOLIC BLOOD PRESSURE: 81 MMHG | WEIGHT: 223 LBS

## 2025-02-02 DIAGNOSIS — S02.2XXA CLOSED FRACTURE OF NASAL BONE, INITIAL ENCOUNTER: Primary | ICD-10-CM

## 2025-02-02 DIAGNOSIS — S09.90XA INJURY OF HEAD, INITIAL ENCOUNTER: ICD-10-CM

## 2025-02-02 PROCEDURE — 99283 EMERGENCY DEPT VISIT LOW MDM: CPT

## 2025-02-02 PROCEDURE — 6370000000 HC RX 637 (ALT 250 FOR IP): Performed by: PHYSICIAN ASSISTANT

## 2025-02-02 RX ORDER — NAPROXEN 250 MG/1
500 TABLET ORAL ONCE
Status: COMPLETED | OUTPATIENT
Start: 2025-02-02 | End: 2025-02-02

## 2025-02-02 RX ADMIN — NAPROXEN 500 MG: 250 TABLET ORAL at 22:03

## 2025-02-02 ASSESSMENT — PAIN SCALES - GENERAL: PAINLEVEL_OUTOF10: 6

## 2025-02-02 ASSESSMENT — PAIN DESCRIPTION - LOCATION: LOCATION: NOSE

## 2025-02-02 ASSESSMENT — LIFESTYLE VARIABLES
HOW MANY STANDARD DRINKS CONTAINING ALCOHOL DO YOU HAVE ON A TYPICAL DAY: PATIENT DOES NOT DRINK
HOW OFTEN DO YOU HAVE A DRINK CONTAINING ALCOHOL: NEVER

## 2025-02-02 ASSESSMENT — PAIN - FUNCTIONAL ASSESSMENT: PAIN_FUNCTIONAL_ASSESSMENT: 0-10

## 2025-02-02 ASSESSMENT — PAIN DESCRIPTION - PAIN TYPE: TYPE: ACUTE PAIN

## 2025-02-03 NOTE — ED PROVIDER NOTES
[facility] EMERGENCY DEPARTMENT  EMERGENCY DEPARTMENT ENCOUNTER        Pt Name: Marleny Anton  MRN: [id number]  Birthdate 1980  Date of evaluation: 2025  Provider: Chet Mccoy PA-C  PCP: Kirstin Fierro MD  Note Started: 10:01 PM EST 25      LIZZY. I have evaluated this patient.        CHIEF COMPLAINT       Chief Complaint   Patient presents with    Nasal Injury     Pt w c/o possible broken nose. Pt was accidentally hit on her nose by the door while moving furniture. Pt denied LOC.  No epistaxis.        HISTORY OF PRESENT ILLNESS: 1 or more Elements     History From: patient  Limitations to history : None    Marleny Anton is a 44 y.o. female who presents to the emergency department with a chief complaint of some pain over the bridge of her nose and forehead.  Patient states she was cleaning in the room and the door into the room with some is off the hinges and her  was carrying a TV and when the door got knocked off the hinges and hit her in the face.  She denies any use of antiplatelets, anticoagulants, loss of consciousness, retrograde amnesia or any bleeding.  Denies seizures, neck pain, difficulty moving her extremities or any other injury.  States she is having pain over her nasal bridge.  She took some Tylenol for this already.  This happened about 3 hours before I see her.  She drove here to the emergency department.    Nursing Notes were all reviewed and agreed with or any disagreements were addressed in the HPI.    REVIEW OF SYSTEMS :      Review of Systems    Positives and Pertinent negatives as per HPI.     SURGICAL HISTORY     Past Surgical History:   Procedure Laterality Date     SECTION      CHOLECYSTECTOMY      DILATION AND CURETTAGE OF UTERUS  2016    FOOT SURGERY Left     Year     HYSTERECTOMY (CERVIX STATUS UNKNOWN)      HYSTERECTOMY, TOTAL ABDOMINAL (CERVIX REMOVED)      HYSTERECTOMY, VAGINAL      Had a penectomy  surgery    LUMBAR

## 2025-03-08 ENCOUNTER — APPOINTMENT (OUTPATIENT)
Dept: CT IMAGING | Age: 45
End: 2025-03-08
Payer: COMMERCIAL

## 2025-03-08 ENCOUNTER — APPOINTMENT (OUTPATIENT)
Dept: GENERAL RADIOLOGY | Age: 45
End: 2025-03-08
Payer: COMMERCIAL

## 2025-03-08 ENCOUNTER — HOSPITAL ENCOUNTER (EMERGENCY)
Age: 45
Discharge: HOME | End: 2025-03-08
Attending: EMERGENCY MEDICINE
Payer: COMMERCIAL

## 2025-03-08 VITALS
HEART RATE: 96 BPM | TEMPERATURE: 98.1 F | WEIGHT: 224.9 LBS | BODY MASS INDEX: 35.22 KG/M2 | DIASTOLIC BLOOD PRESSURE: 58 MMHG | RESPIRATION RATE: 16 BRPM | SYSTOLIC BLOOD PRESSURE: 123 MMHG | OXYGEN SATURATION: 98 %

## 2025-03-08 DIAGNOSIS — R19.7 NAUSEA VOMITING AND DIARRHEA: Primary | ICD-10-CM

## 2025-03-08 DIAGNOSIS — R11.2 NAUSEA VOMITING AND DIARRHEA: Primary | ICD-10-CM

## 2025-03-08 LAB
ALBUMIN SERPL-MCNC: 4.6 G/DL (ref 3.4–5)
ALBUMIN/GLOB SERPL: 1.4 {RATIO} (ref 1.1–2.2)
ALP SERPL-CCNC: 93 U/L (ref 40–129)
ALT SERPL-CCNC: 17 U/L (ref 10–40)
ANION GAP SERPL CALCULATED.3IONS-SCNC: 14 MMOL/L (ref 3–16)
AST SERPL-CCNC: 37 U/L (ref 15–37)
BACTERIA URNS QL MICRO: ABNORMAL /HPF
BASOPHILS # BLD: 0 K/UL (ref 0–0.2)
BASOPHILS NFR BLD: 0.3 %
BILIRUB SERPL-MCNC: 0.5 MG/DL (ref 0–1)
BILIRUB UR QL STRIP.AUTO: NEGATIVE
BUN SERPL-MCNC: 11 MG/DL (ref 7–20)
CALCIUM SERPL-MCNC: 9.1 MG/DL (ref 8.3–10.6)
CHLORIDE SERPL-SCNC: 103 MMOL/L (ref 99–110)
CLARITY UR: CLEAR
CO2 SERPL-SCNC: 20 MMOL/L (ref 21–32)
COLOR UR: ABNORMAL
CREAT SERPL-MCNC: 0.9 MG/DL (ref 0.6–1.1)
DEPRECATED RDW RBC AUTO: 14.1 % (ref 12.4–15.4)
EKG ATRIAL RATE: 119 BPM
EKG DIAGNOSIS: NORMAL
EKG P AXIS: 61 DEGREES
EKG P-R INTERVAL: 154 MS
EKG Q-T INTERVAL: 318 MS
EKG QRS DURATION: 78 MS
EKG QTC CALCULATION (BAZETT): 447 MS
EKG R AXIS: 66 DEGREES
EKG T AXIS: 55 DEGREES
EKG VENTRICULAR RATE: 119 BPM
EOSINOPHIL # BLD: 0 K/UL (ref 0–0.6)
EOSINOPHIL NFR BLD: 0 %
EPI CELLS #/AREA URNS AUTO: 8 /HPF (ref 0–5)
FLUAV RNA RESP QL NAA+PROBE: NOT DETECTED
FLUBV RNA RESP QL NAA+PROBE: NOT DETECTED
GFR SERPLBLD CREATININE-BSD FMLA CKD-EPI: 80 ML/MIN/{1.73_M2}
GLUCOSE SERPL-MCNC: 102 MG/DL (ref 70–99)
GLUCOSE UR STRIP.AUTO-MCNC: NEGATIVE MG/DL
HCT VFR BLD AUTO: 37.7 % (ref 36–48)
HGB BLD-MCNC: 12.5 G/DL (ref 12–16)
HGB UR QL STRIP.AUTO: NEGATIVE
HYALINE CASTS #/AREA URNS AUTO: 2 /LPF (ref 0–8)
KETONES UR STRIP.AUTO-MCNC: NEGATIVE MG/DL
LACTATE BLDV-SCNC: 1.6 MMOL/L (ref 0.4–1.9)
LEUKOCYTE ESTERASE UR QL STRIP.AUTO: NEGATIVE
LIPASE SERPL-CCNC: 18 U/L (ref 13–60)
LYMPHOCYTES # BLD: 0.7 K/UL (ref 1–5.1)
LYMPHOCYTES NFR BLD: 5.7 %
MCH RBC QN AUTO: 26.8 PG (ref 26–34)
MCHC RBC AUTO-ENTMCNC: 33.1 G/DL (ref 31–36)
MCV RBC AUTO: 81.1 FL (ref 80–100)
MONOCYTES # BLD: 0.5 K/UL (ref 0–1.3)
MONOCYTES NFR BLD: 4.2 %
NEUTROPHILS # BLD: 11.2 K/UL (ref 1.7–7.7)
NEUTROPHILS NFR BLD: 89.8 %
NITRITE UR QL STRIP.AUTO: NEGATIVE
PH UR STRIP.AUTO: 5.5 [PH] (ref 5–8)
PLATELET # BLD AUTO: 264 K/UL (ref 135–450)
PLATELET BLD QL SMEAR: ADEQUATE
PMV BLD AUTO: 9.3 FL (ref 5–10.5)
POTASSIUM SERPL-SCNC: 4.5 MMOL/L (ref 3.5–5.1)
PROT SERPL-MCNC: 7.9 G/DL (ref 6.4–8.2)
PROT UR STRIP.AUTO-MCNC: ABNORMAL MG/DL
RBC # BLD AUTO: 4.65 M/UL (ref 4–5.2)
RBC CLUMPS #/AREA URNS AUTO: 2 /HPF (ref 0–4)
SARS-COV-2 RNA RESP QL NAA+PROBE: NOT DETECTED
SLIDE REVIEW: ABNORMAL
SODIUM SERPL-SCNC: 137 MMOL/L (ref 136–145)
SP GR UR STRIP.AUTO: >=1.03 (ref 1–1.03)
UA COMPLETE W REFLEX CULTURE PNL UR: ABNORMAL
UA DIPSTICK W REFLEX MICRO PNL UR: YES
URN SPEC COLLECT METH UR: ABNORMAL
UROBILINOGEN UR STRIP-ACNC: 1 E.U./DL
WBC # BLD AUTO: 12.5 K/UL (ref 4–11)
WBC #/AREA URNS AUTO: 4 /HPF (ref 0–5)

## 2025-03-08 PROCEDURE — 96374 THER/PROPH/DIAG INJ IV PUSH: CPT

## 2025-03-08 PROCEDURE — 71045 X-RAY EXAM CHEST 1 VIEW: CPT

## 2025-03-08 PROCEDURE — 81001 URINALYSIS AUTO W/SCOPE: CPT

## 2025-03-08 PROCEDURE — 6360000002 HC RX W HCPCS: Performed by: EMERGENCY MEDICINE

## 2025-03-08 PROCEDURE — 80053 COMPREHEN METABOLIC PANEL: CPT

## 2025-03-08 PROCEDURE — 96375 TX/PRO/DX INJ NEW DRUG ADDON: CPT

## 2025-03-08 PROCEDURE — 87636 SARSCOV2 & INF A&B AMP PRB: CPT

## 2025-03-08 PROCEDURE — 74177 CT ABD & PELVIS W/CONTRAST: CPT

## 2025-03-08 PROCEDURE — 96361 HYDRATE IV INFUSION ADD-ON: CPT

## 2025-03-08 PROCEDURE — 83690 ASSAY OF LIPASE: CPT

## 2025-03-08 PROCEDURE — 2580000003 HC RX 258: Performed by: PHYSICIAN ASSISTANT

## 2025-03-08 PROCEDURE — 36415 COLL VENOUS BLD VENIPUNCTURE: CPT

## 2025-03-08 PROCEDURE — 6360000004 HC RX CONTRAST MEDICATION: Performed by: EMERGENCY MEDICINE

## 2025-03-08 PROCEDURE — 93010 ELECTROCARDIOGRAM REPORT: CPT | Performed by: INTERNAL MEDICINE

## 2025-03-08 PROCEDURE — 70450 CT HEAD/BRAIN W/O DYE: CPT

## 2025-03-08 PROCEDURE — 85025 COMPLETE CBC W/AUTO DIFF WBC: CPT

## 2025-03-08 PROCEDURE — 83605 ASSAY OF LACTIC ACID: CPT

## 2025-03-08 PROCEDURE — 2580000003 HC RX 258: Performed by: EMERGENCY MEDICINE

## 2025-03-08 PROCEDURE — 93005 ELECTROCARDIOGRAM TRACING: CPT | Performed by: EMERGENCY MEDICINE

## 2025-03-08 PROCEDURE — 99285 EMERGENCY DEPT VISIT HI MDM: CPT

## 2025-03-08 RX ORDER — DIPHENHYDRAMINE HYDROCHLORIDE 50 MG/ML
25 INJECTION INTRAMUSCULAR; INTRAVENOUS ONCE
Status: COMPLETED | OUTPATIENT
Start: 2025-03-08 | End: 2025-03-08

## 2025-03-08 RX ORDER — ONDANSETRON 2 MG/ML
4 INJECTION INTRAMUSCULAR; INTRAVENOUS
Status: DISCONTINUED | OUTPATIENT
Start: 2025-03-08 | End: 2025-03-08 | Stop reason: HOSPADM

## 2025-03-08 RX ORDER — ONDANSETRON 4 MG/1
4-8 TABLET, ORALLY DISINTEGRATING ORAL EVERY 12 HOURS PRN
Qty: 20 TABLET | Refills: 0 | Status: SHIPPED | OUTPATIENT
Start: 2025-03-08

## 2025-03-08 RX ORDER — METOCLOPRAMIDE HYDROCHLORIDE 5 MG/ML
10 INJECTION INTRAMUSCULAR; INTRAVENOUS ONCE
Status: COMPLETED | OUTPATIENT
Start: 2025-03-08 | End: 2025-03-08

## 2025-03-08 RX ORDER — 0.9 % SODIUM CHLORIDE 0.9 %
1000 INTRAVENOUS SOLUTION INTRAVENOUS ONCE
Status: COMPLETED | OUTPATIENT
Start: 2025-03-08 | End: 2025-03-08

## 2025-03-08 RX ORDER — PANTOPRAZOLE SODIUM 40 MG/10ML
40 INJECTION, POWDER, LYOPHILIZED, FOR SOLUTION INTRAVENOUS ONCE
Status: COMPLETED | OUTPATIENT
Start: 2025-03-08 | End: 2025-03-08

## 2025-03-08 RX ADMIN — SODIUM CHLORIDE 1000 ML: 0.9 INJECTION, SOLUTION INTRAVENOUS at 09:56

## 2025-03-08 RX ADMIN — DIPHENHYDRAMINE HYDROCHLORIDE 25 MG: 50 INJECTION INTRAMUSCULAR; INTRAVENOUS at 09:58

## 2025-03-08 RX ADMIN — METOCLOPRAMIDE 10 MG: 5 INJECTION, SOLUTION INTRAMUSCULAR; INTRAVENOUS at 09:58

## 2025-03-08 RX ADMIN — IOHEXOL 75 ML: 350 INJECTION, SOLUTION INTRAVENOUS at 09:38

## 2025-03-08 RX ADMIN — SODIUM CHLORIDE 1000 ML: 0.9 INJECTION, SOLUTION INTRAVENOUS at 11:54

## 2025-03-08 RX ADMIN — PANTOPRAZOLE SODIUM 40 MG: 40 INJECTION, POWDER, FOR SOLUTION INTRAVENOUS at 10:01

## 2025-03-08 RX ADMIN — ONDANSETRON 4 MG: 2 INJECTION, SOLUTION INTRAMUSCULAR; INTRAVENOUS at 09:57

## 2025-03-08 ASSESSMENT — PAIN - FUNCTIONAL ASSESSMENT
PAIN_FUNCTIONAL_ASSESSMENT: 0-10
PAIN_FUNCTIONAL_ASSESSMENT: 0-10

## 2025-03-08 ASSESSMENT — PAIN DESCRIPTION - LOCATION
LOCATION: GENERALIZED
LOCATION: GENERALIZED

## 2025-03-08 ASSESSMENT — ENCOUNTER SYMPTOMS
ABDOMINAL PAIN: 0
WHEEZING: 0
NAUSEA: 1
COUGH: 0
VOMITING: 1
SHORTNESS OF BREATH: 1
DIARRHEA: 0
RHINORRHEA: 0

## 2025-03-08 ASSESSMENT — PAIN SCALES - GENERAL
PAINLEVEL_OUTOF10: 10
PAINLEVEL_OUTOF10: 7

## 2025-03-08 ASSESSMENT — PAIN DESCRIPTION - PAIN TYPE: TYPE: ACUTE PAIN

## 2025-03-08 NOTE — ED PROVIDER NOTES
I personally saw the patient and made/approved the management plan and take responsibility for the patient management.    For further details of Marleny Anton's emergency department encounter, please see the advanced practice provider's documentation.    I, Joni Alex MD, am the primary physician provider of record.    CHIEF COMPLAINT  Chief Complaint   Patient presents with    Vomiting     Pt presents to the ER with the complaint of nausea, vomiting, headache, chest pain, and SOB. Pt states she recently started taking a weight loss medication and is curious if it is associated.        Briefly, Marleny Anton is a 44 y.o. female  who presents to the ED complaining of onset of headache nausea vomiting and diarrhea with some generalized abdominal cramping since last night into today.  Does not use alcohol or marijuana.  She recently started Qsymia (adipex/topiramate) for weight loss.  Not sure if it is related.  She is not currently on a peptide/semaglutide medicine anymore.  No reported fevers.  She ate fast food fried chicken yesterday.    FOCUSED PHYSICAL EXAMINATION  BP (!) 123/58   Pulse 96   Temp 98.1 °F (36.7 °C) (Oral)   Resp 16   Wt 102 kg (224 lb 14.4 oz)   LMP 03/20/2018   SpO2 98%   BMI 35.22 kg/m²    Focused physical examination notable for no acute distress, well-appearing, well-nourished, normal speech and mentation without obvious facial droop, no obvious rash.  No obvious cranial nerve deficits on my initial exam.  Dry mucous membranes.  Tachycardic regular rhythm.  Clear to auscultation bilaterally.  Minimal diffuse nonfocal abdominal tenderness without peritonitis, hyperactive bowel sounds noted.      EKG performed in ED:  The 12 lead EKG was interpreted by me independent of a cardiologist as follows:  Rate: tachycardia with a rate of 119  Rhythm: sinus  Axis: normal  Intervals: normal [place], narrow QRS, normal QTc  ST segments: no ST elevations or depressions  T waves: no abnormal

## 2025-03-08 NOTE — ED PROVIDER NOTES
discomfort with no focal reproducible tenderness or peritoneal signs.  Hyperactive bowel sounds noted.  She was given IV fluids, Bentyl, Zofran, Reglan and Protonix for symptomatic relief and will be reevaluated and p.o. challenged.  Please see attending note for EKG interpretation.    Disposition Considerations (tests considered but not done, Admit vs D/C, Shared Decision Making, Pt Expectation of Test or Tx.): CBC and CMP are relatively unremarkable aside from a white count of 12.5.  Lipase 18.  Urinalysis is negative.  Lactate 1.6.  COVID and flu negative.  CT abdomen pelvis is unremarkable aside from fat-containing umbilical hernia.  No bowel involvement or evidence of strangulation.  Status postcholecystectomy.  Patient has significant improvement on reevaluation though she was still tachycardic.  Given additional fluid resuscitation with correction of heart rate.  Tolerating p.o. without difficulty. Given prescription for zofran.    I see nothing that would suggest an acute abdomen at this time. Based on history, physical exam, risk factors, and tests my suspicion for bowel obstruction, incarcerated hernia, acute pancreatitis, intra-abdominal abscess, perforated viscus, diverticulitis, cholecystitis, appendicitis, PID, ovarian torsion, ectopic pregnancy and tubo-ovarian abscess is very low. There is no evidence of peritonitis, sepsis or toxicity at this time. I feel the patient can be managed as an outpatient with follow-up with her family doctor in 24-48 hours. Instructions have been given for the patient to return to the ED for worsening of the pain, high fevers, intractable vomiting, or bleeding.  Stable for discharge.      I am the Primary Clinician of Record.  FINAL IMPRESSION      1. Nausea vomiting and diarrhea          DISPOSITION/PLAN     DISPOSITION Decision To Discharge 03/08/2025 12:44:25 PM   DISPOSITION CONDITION Stable           PATIENT REFERRED TO:  No follow-up provider specified.    DISCHARGE  MEDICATIONS:  New Prescriptions    ONDANSETRON (ZOFRAN-ODT) 4 MG DISINTEGRATING TABLET    Take 1-2 tablets by mouth every 12 hours as needed for Nausea or Vomiting       DISCONTINUED MEDICATIONS:  Discontinued Medications    No medications on file              (Please note that portions of this note were completed with a voice recognition program.  Efforts were made to edit the dictations but occasionally words are mis-transcribed.)    Ijeoma Aponte PA-C (electronically signed)           Ijeoma Aponte PA-C  03/08/25 2365

## (undated) DEVICE — STERILE POLYISOPRENE POWDER-FREE SURGICAL GLOVES: Brand: PROTEXIS

## (undated) DEVICE — CHLORAPREP 26ML ORANGE

## (undated) DEVICE — STANDARD HYPODERMIC NEEDLE,POLYPROPYLENE HUB: Brand: MONOJECT

## (undated) DEVICE — SYRINGE MED 3ML CLR PLAS STD N CTRL LUERLOCK TIP DISP

## (undated) DEVICE — NEEDLE SPNL 22GA L5IN BLK HUB S STL W/ QNCKE PNT W/OUT

## (undated) DEVICE — MEDIA CONTRAST RX ISOVUE-300 61% 30ML VIALS

## (undated) DEVICE — DISPOSABLE OR TOWEL: Brand: CARDINAL HEALTH

## (undated) DEVICE — PEN: MARKING STD 100/CS: Brand: MEDICAL ACTION INDUSTRIES

## (undated) DEVICE — PORT VLV 2 W NDL FREE SMRTSITE

## (undated) DEVICE — Device: Brand: JELCO

## (undated) DEVICE — UNIVERSAL BLOCK TRAY: Brand: AVANOS*

## (undated) DEVICE — NEEDLE SPNL 22GA L3.5IN BLK HUB S STL REG WALL FIT STYL W/

## (undated) DEVICE — SET EXTN L7IN PRIMING VOL 0.5ML PRSS RATE STD BOR 1 REM